# Patient Record
Sex: FEMALE | Race: WHITE | Employment: OTHER | URBAN - METROPOLITAN AREA
[De-identification: names, ages, dates, MRNs, and addresses within clinical notes are randomized per-mention and may not be internally consistent; named-entity substitution may affect disease eponyms.]

---

## 2018-01-01 ENCOUNTER — HOSPITAL ENCOUNTER (OUTPATIENT)
Dept: LAB | Age: 78
Discharge: HOME | End: 2018-12-17
Payer: MEDICARE

## 2018-01-01 LAB
ALBUMIN SERPL-MCNC: 4.1 G/DL (ref 3.9–4.9)
ALP BLD-CCNC: 61 U/L (ref 40–130)
ALT SERPL-CCNC: 23 U/L (ref 0–33)
ANION GAP SERPL CALCULATED.3IONS-SCNC: 11 MEQ/L (ref 7–13)
AST SERPL-CCNC: 34 U/L (ref 0–35)
BILIRUB SERPL-MCNC: 0.4 MG/DL (ref 0–1.2)
BUN BLDV-MCNC: 18 MG/DL (ref 8–23)
CALCIUM SERPL-MCNC: 8.9 MG/DL (ref 8.6–10.2)
CHLORIDE BLD-SCNC: 93 MEQ/L (ref 98–107)
CHOLESTEROL, TOTAL: 162 MG/DL (ref 0–199)
CO2: 28 MEQ/L (ref 22–29)
CREAT SERPL-MCNC: 0.55 MG/DL (ref 0.5–0.9)
GFR AFRICAN AMERICAN: >60
GFR NON-AFRICAN AMERICAN: >60
GLOBULIN: 3.2 G/DL (ref 2.3–3.5)
GLUCOSE BLD-MCNC: 89 MG/DL (ref 74–109)
HDLC SERPL-MCNC: 51 MG/DL (ref 40–59)
LDL CHOLESTEROL CALCULATED: 91 MG/DL (ref 0–129)
POTASSIUM SERPL-SCNC: 3.8 MEQ/L (ref 3.5–5.1)
SODIUM BLD-SCNC: 132 MEQ/L (ref 132–144)
TOTAL PROTEIN: 7.3 G/DL (ref 6.4–8.1)
TRIGL SERPL-MCNC: 99 MG/DL (ref 0–200)
TSH SERPL DL<=0.05 MIU/L-ACNC: 1.82 UIU/ML (ref 0.27–4.2)

## 2018-01-01 PROCEDURE — 80061 LIPID PANEL: CPT

## 2018-01-01 PROCEDURE — 36415 COLL VENOUS BLD VENIPUNCTURE: CPT

## 2018-01-01 PROCEDURE — 80053 COMPREHEN METABOLIC PANEL: CPT

## 2018-01-01 PROCEDURE — 84443 ASSAY THYROID STIM HORMONE: CPT

## 2019-01-01 ENCOUNTER — APPOINTMENT (OUTPATIENT)
Dept: GENERAL RADIOLOGY | Age: 79
DRG: 871 | End: 2019-01-01
Attending: INTERNAL MEDICINE
Payer: MEDICARE

## 2019-01-01 ENCOUNTER — APPOINTMENT (OUTPATIENT)
Dept: ULTRASOUND IMAGING | Age: 79
DRG: 871 | End: 2019-01-01
Attending: INTERNAL MEDICINE
Payer: MEDICARE

## 2019-01-01 ENCOUNTER — HOSPITAL ENCOUNTER (INPATIENT)
Age: 79
LOS: 7 days | Discharge: HOSPICE | DRG: 871 | End: 2019-05-14
Attending: INTERNAL MEDICINE | Admitting: INTERNAL MEDICINE
Payer: MEDICARE

## 2019-01-01 ENCOUNTER — APPOINTMENT (OUTPATIENT)
Dept: CT IMAGING | Age: 79
DRG: 871 | End: 2019-01-01
Attending: INTERNAL MEDICINE
Payer: MEDICARE

## 2019-01-01 ENCOUNTER — HOSPITAL ENCOUNTER (OUTPATIENT)
Dept: LAB | Age: 79
Discharge: HOME | End: 2019-05-06
Payer: MEDICARE

## 2019-01-01 ENCOUNTER — HOSPITAL ENCOUNTER (EMERGENCY)
Age: 79
Discharge: HOME | End: 2019-04-27
Attending: EMERGENCY MEDICINE
Payer: MEDICARE

## 2019-01-01 ENCOUNTER — APPOINTMENT (OUTPATIENT)
Dept: GENERAL RADIOLOGY | Age: 79
End: 2019-01-01
Payer: MEDICARE

## 2019-01-01 ENCOUNTER — APPOINTMENT (OUTPATIENT)
Dept: INTERVENTIONAL RADIOLOGY/VASCULAR | Age: 79
DRG: 871 | End: 2019-01-01
Attending: INTERNAL MEDICINE
Payer: MEDICARE

## 2019-01-01 ENCOUNTER — HOSPITAL ENCOUNTER (EMERGENCY)
Age: 79
Discharge: ANOTHER HOSPITAL | End: 2019-05-07
Attending: EMERGENCY MEDICINE
Payer: MEDICARE

## 2019-01-01 ENCOUNTER — APPOINTMENT (OUTPATIENT)
Dept: CT IMAGING | Age: 79
End: 2019-01-01
Payer: MEDICARE

## 2019-01-01 VITALS
BODY MASS INDEX: 19.15 KG/M2 | WEIGHT: 95 LBS | HEIGHT: 59 IN | RESPIRATION RATE: 20 BRPM | OXYGEN SATURATION: 94 % | DIASTOLIC BLOOD PRESSURE: 70 MMHG | HEART RATE: 98 BPM | SYSTOLIC BLOOD PRESSURE: 149 MMHG | TEMPERATURE: 98.5 F

## 2019-01-01 VITALS
BODY MASS INDEX: 18.89 KG/M2 | WEIGHT: 90 LBS | DIASTOLIC BLOOD PRESSURE: 56 MMHG | SYSTOLIC BLOOD PRESSURE: 96 MMHG | OXYGEN SATURATION: 92 % | RESPIRATION RATE: 23 BRPM | HEIGHT: 58 IN | TEMPERATURE: 99.4 F | HEART RATE: 102 BPM

## 2019-01-01 VITALS
SYSTOLIC BLOOD PRESSURE: 146 MMHG | DIASTOLIC BLOOD PRESSURE: 74 MMHG | HEART RATE: 111 BPM | WEIGHT: 97.66 LBS | RESPIRATION RATE: 28 BRPM | BODY MASS INDEX: 20.5 KG/M2 | TEMPERATURE: 97.8 F | OXYGEN SATURATION: 90 % | HEIGHT: 58 IN

## 2019-01-01 DIAGNOSIS — I47.1 PAROXYSMAL SUPRAVENTRICULAR TACHYCARDIA (HCC): ICD-10-CM

## 2019-01-01 DIAGNOSIS — J90 PLEURAL EFFUSION, LEFT: ICD-10-CM

## 2019-01-01 DIAGNOSIS — R11.2 NAUSEA AND VOMITING, INTRACTABILITY OF VOMITING NOT SPECIFIED, UNSPECIFIED VOMITING TYPE: ICD-10-CM

## 2019-01-01 DIAGNOSIS — J18.9 PNEUMONIA DUE TO ORGANISM: Primary | ICD-10-CM

## 2019-01-01 DIAGNOSIS — R53.1 GENERALIZED WEAKNESS: ICD-10-CM

## 2019-01-01 DIAGNOSIS — J18.9 PNEUMONIA OF RIGHT MIDDLE LOBE DUE TO INFECTIOUS ORGANISM: Primary | ICD-10-CM

## 2019-01-01 DIAGNOSIS — I95.9 HYPOTENSION, UNSPECIFIED HYPOTENSION TYPE: ICD-10-CM

## 2019-01-01 DIAGNOSIS — R77.8 ELEVATED TROPONIN: ICD-10-CM

## 2019-01-01 LAB
ALBUMIN FLUID: 2 G/DL
ALBUMIN SERPL-MCNC: 2.3 G/DL (ref 3.5–4.6)
ALBUMIN SERPL-MCNC: 2.4 G/DL (ref 3.5–4.6)
ALBUMIN SERPL-MCNC: 2.5 G/DL (ref 3.5–4.6)
ALBUMIN SERPL-MCNC: 2.6 G/DL (ref 3.5–4.6)
ALBUMIN SERPL-MCNC: 2.7 G/DL (ref 3.5–4.6)
ALBUMIN SERPL-MCNC: 2.7 G/DL (ref 3.5–4.6)
ALBUMIN SERPL-MCNC: 2.8 G/DL (ref 3.5–4.6)
ALBUMIN SERPL-MCNC: 2.9 G/DL (ref 3.5–4.6)
ALBUMIN SERPL-MCNC: 3.2 G/DL (ref 3.5–4.6)
ALP BLD-CCNC: 35 U/L (ref 40–130)
ALP BLD-CCNC: 35 U/L (ref 40–130)
ALP BLD-CCNC: 36 U/L (ref 40–130)
ALP BLD-CCNC: 41 U/L (ref 40–130)
ALP BLD-CCNC: 43 U/L (ref 40–130)
ALP BLD-CCNC: 46 U/L (ref 40–130)
ALP BLD-CCNC: 50 U/L (ref 40–130)
ALP BLD-CCNC: 51 U/L (ref 40–130)
ALP BLD-CCNC: 55 U/L (ref 40–130)
ALT SERPL-CCNC: 10 U/L (ref 0–33)
ALT SERPL-CCNC: 11 U/L (ref 0–33)
ALT SERPL-CCNC: 11 U/L (ref 0–33)
ALT SERPL-CCNC: 15 U/L (ref 0–33)
ALT SERPL-CCNC: 15 U/L (ref 0–33)
ALT SERPL-CCNC: 18 U/L (ref 0–33)
ALT SERPL-CCNC: 19 U/L (ref 0–33)
ALT SERPL-CCNC: 20 U/L (ref 0–33)
ALT SERPL-CCNC: 20 U/L (ref 0–33)
AMYLASE FLUID: 29 U/L
AMYLASE FLUID: 43 U/L
ANION GAP SERPL CALCULATED.3IONS-SCNC: 10 MEQ/L (ref 9–15)
ANION GAP SERPL CALCULATED.3IONS-SCNC: 11 MEQ/L (ref 9–15)
ANION GAP SERPL CALCULATED.3IONS-SCNC: 11 MEQ/L (ref 9–15)
ANION GAP SERPL CALCULATED.3IONS-SCNC: 13 MEQ/L (ref 9–15)
ANION GAP SERPL CALCULATED.3IONS-SCNC: 13 MEQ/L (ref 9–15)
ANION GAP SERPL CALCULATED.3IONS-SCNC: 14 MEQ/L (ref 9–15)
ANION GAP SERPL CALCULATED.3IONS-SCNC: 14 MEQ/L (ref 9–15)
ANION GAP SERPL CALCULATED.3IONS-SCNC: 16 MEQ/L (ref 9–15)
ANION GAP SERPL CALCULATED.3IONS-SCNC: 6 MEQ/L (ref 9–15)
ANISOCYTOSIS: 0
APPEARANCE FLUID: CLEAR
APPEARANCE FLUID: NORMAL
AST SERPL-CCNC: 20 U/L (ref 0–35)
AST SERPL-CCNC: 20 U/L (ref 0–35)
AST SERPL-CCNC: 22 U/L (ref 0–35)
AST SERPL-CCNC: 25 U/L (ref 0–35)
AST SERPL-CCNC: 30 U/L (ref 0–35)
AST SERPL-CCNC: 32 U/L (ref 0–35)
AST SERPL-CCNC: 34 U/L (ref 0–35)
AST SERPL-CCNC: 41 U/L (ref 0–35)
AST SERPL-CCNC: 41 U/L (ref 0–35)
BASE EXCESS ARTERIAL: 11 (ref -3–3)
BASE EXCESS ARTERIAL: 5 (ref -3–3)
BASOPHILS ABSOLUTE: 0 K/UL (ref 0–0.2)
BASOPHILS RELATIVE PERCENT: 0.1 %
BASOPHILS RELATIVE PERCENT: 0.2 %
BASOPHILS RELATIVE PERCENT: 0.2 %
BASOPHILS RELATIVE PERCENT: 0.3 %
BASOPHILS RELATIVE PERCENT: 0.6 %
BILIRUB SERPL-MCNC: 0.4 MG/DL (ref 0.2–0.7)
BILIRUB SERPL-MCNC: 0.5 MG/DL (ref 0.2–0.7)
BILIRUB SERPL-MCNC: 0.6 MG/DL (ref 0.2–0.7)
BILIRUB SERPL-MCNC: 0.6 MG/DL (ref 0.2–0.7)
BILIRUB SERPL-MCNC: 0.7 MG/DL (ref 0.2–0.7)
BILIRUB SERPL-MCNC: 0.7 MG/DL (ref 0.2–0.7)
BILIRUB SERPL-MCNC: 0.8 MG/DL (ref 0.2–0.7)
BILIRUB SERPL-MCNC: 0.9 MG/DL (ref 0.2–0.7)
BILIRUB SERPL-MCNC: 1 MG/DL (ref 0.2–0.7)
BILIRUBIN URINE: NEGATIVE
BLOOD CULTURE, ROUTINE: NORMAL
BLOOD, URINE: NEGATIVE
BODY FLUID CULTURE, STERILE: NORMAL
BODY FLUID CULTURE, STERILE: NORMAL
BUN BLDV-MCNC: 13 MG/DL (ref 8–23)
BUN BLDV-MCNC: 16 MG/DL (ref 8–23)
BUN BLDV-MCNC: 16 MG/DL (ref 8–23)
BUN BLDV-MCNC: 17 MG/DL (ref 8–23)
BUN BLDV-MCNC: 20 MG/DL (ref 8–23)
BUN BLDV-MCNC: 30 MG/DL (ref 8–23)
BUN BLDV-MCNC: 46 MG/DL (ref 8–23)
BUN BLDV-MCNC: 48 MG/DL (ref 8–23)
BUN BLDV-MCNC: 51 MG/DL (ref 8–23)
CALCIUM IONIZED: 1.07 MMOL/L (ref 1.12–1.32)
CALCIUM IONIZED: 1.16 MMOL/L (ref 1.12–1.32)
CALCIUM SERPL-MCNC: 7.6 MG/DL (ref 8.5–9.9)
CALCIUM SERPL-MCNC: 7.6 MG/DL (ref 8.5–9.9)
CALCIUM SERPL-MCNC: 7.8 MG/DL (ref 8.5–9.9)
CALCIUM SERPL-MCNC: 7.8 MG/DL (ref 8.5–9.9)
CALCIUM SERPL-MCNC: 8 MG/DL (ref 8.5–9.9)
CALCIUM SERPL-MCNC: 8.1 MG/DL (ref 8.5–9.9)
CALCIUM SERPL-MCNC: 8.1 MG/DL (ref 8.5–9.9)
CALCIUM SERPL-MCNC: 8.6 MG/DL (ref 8.5–9.9)
CALCIUM SERPL-MCNC: 9 MG/DL (ref 8.5–9.9)
CELL COUNT FLUID TYPE: NORMAL
CELL COUNT FLUID TYPE: NORMAL
CHLORIDE BLD-SCNC: 80 MEQ/L (ref 95–107)
CHLORIDE BLD-SCNC: 83 MEQ/L (ref 95–107)
CHLORIDE BLD-SCNC: 86 MEQ/L (ref 95–107)
CHLORIDE BLD-SCNC: 87 MEQ/L (ref 95–107)
CHLORIDE BLD-SCNC: 90 MEQ/L (ref 95–107)
CHLORIDE BLD-SCNC: 91 MEQ/L (ref 95–107)
CHLORIDE BLD-SCNC: 92 MEQ/L (ref 95–107)
CHLORIDE BLD-SCNC: 93 MEQ/L (ref 95–107)
CHLORIDE BLD-SCNC: 94 MEQ/L (ref 95–107)
CLARITY: CLEAR
CLOT EVALUATION: NORMAL
CLOT EVALUATION: NORMAL
CO2: 27 MEQ/L (ref 20–31)
CO2: 29 MEQ/L (ref 20–31)
CO2: 29 MEQ/L (ref 20–31)
CO2: 31 MEQ/L (ref 20–31)
CO2: 32 MEQ/L (ref 20–31)
CO2: 32 MEQ/L (ref 20–31)
CO2: 33 MEQ/L (ref 20–31)
CO2: 36 MEQ/L (ref 20–31)
CO2: 37 MEQ/L (ref 20–31)
COLOR FLUID: COLORLESS
COLOR FLUID: YELLOW
COLOR: YELLOW
CREAT SERPL-MCNC: 0.56 MG/DL (ref 0.5–0.9)
CREAT SERPL-MCNC: 0.61 MG/DL (ref 0.5–0.9)
CREAT SERPL-MCNC: 0.63 MG/DL (ref 0.5–0.9)
CREAT SERPL-MCNC: 0.66 MG/DL (ref 0.5–0.9)
CREAT SERPL-MCNC: 0.69 MG/DL (ref 0.5–0.9)
CREAT SERPL-MCNC: 1.17 MG/DL (ref 0.5–0.9)
CREAT SERPL-MCNC: 1.52 MG/DL (ref 0.5–0.9)
CREAT SERPL-MCNC: 1.82 MG/DL (ref 0.5–0.9)
CREAT SERPL-MCNC: 2.01 MG/DL (ref 0.5–0.9)
CULTURE, BLOOD 2: NORMAL
CULTURE, RESPIRATORY: NORMAL
EKG ATRIAL RATE: 101 BPM
EKG ATRIAL RATE: 103 BPM
EKG ATRIAL RATE: 109 BPM
EKG ATRIAL RATE: 156 BPM
EKG ATRIAL RATE: 169 BPM
EKG ATRIAL RATE: 93 BPM
EKG ATRIAL RATE: 96 BPM
EKG ATRIAL RATE: 96 BPM
EKG ATRIAL RATE: 99 BPM
EKG P AXIS: -1 DEGREES
EKG P AXIS: -16 DEGREES
EKG P AXIS: -4 DEGREES
EKG P AXIS: 38 DEGREES
EKG P AXIS: 39 DEGREES
EKG P AXIS: 5 DEGREES
EKG P AXIS: 58 DEGREES
EKG P-R INTERVAL: 106 MS
EKG P-R INTERVAL: 106 MS
EKG P-R INTERVAL: 114 MS
EKG P-R INTERVAL: 118 MS
EKG P-R INTERVAL: 118 MS
EKG P-R INTERVAL: 130 MS
EKG P-R INTERVAL: 228 MS
EKG P-R INTERVAL: 98 MS
EKG Q-T INTERVAL: 234 MS
EKG Q-T INTERVAL: 322 MS
EKG Q-T INTERVAL: 328 MS
EKG Q-T INTERVAL: 336 MS
EKG Q-T INTERVAL: 340 MS
EKG Q-T INTERVAL: 342 MS
EKG Q-T INTERVAL: 346 MS
EKG Q-T INTERVAL: 362 MS
EKG Q-T INTERVAL: 368 MS
EKG QRS DURATION: 74 MS
EKG QRS DURATION: 78 MS
EKG QRS DURATION: 80 MS
EKG QRS DURATION: 82 MS
EKG QRS DURATION: 82 MS
EKG QRS DURATION: 84 MS
EKG QRS DURATION: 86 MS
EKG QTC CALCULATION (BAZETT): 392 MS
EKG QTC CALCULATION (BAZETT): 420 MS
EKG QTC CALCULATION (BAZETT): 429 MS
EKG QTC CALCULATION (BAZETT): 430 MS
EKG QTC CALCULATION (BAZETT): 440 MS
EKG QTC CALCULATION (BAZETT): 460 MS
EKG QTC CALCULATION (BAZETT): 464 MS
EKG QTC CALCULATION (BAZETT): 469 MS
EKG QTC CALCULATION (BAZETT): 522 MS
EKG R AXIS: 40 DEGREES
EKG R AXIS: 55 DEGREES
EKG R AXIS: 56 DEGREES
EKG R AXIS: 57 DEGREES
EKG R AXIS: 58 DEGREES
EKG R AXIS: 58 DEGREES
EKG R AXIS: 59 DEGREES
EKG R AXIS: 66 DEGREES
EKG R AXIS: 69 DEGREES
EKG T AXIS: 117 DEGREES
EKG T AXIS: 35 DEGREES
EKG T AXIS: 70 DEGREES
EKG T AXIS: 74 DEGREES
EKG T AXIS: 74 DEGREES
EKG T AXIS: 80 DEGREES
EKG T AXIS: 80 DEGREES
EKG T AXIS: 81 DEGREES
EKG T AXIS: 89 DEGREES
EKG VENTRICULAR RATE: 101 BPM
EKG VENTRICULAR RATE: 103 BPM
EKG VENTRICULAR RATE: 109 BPM
EKG VENTRICULAR RATE: 158 BPM
EKG VENTRICULAR RATE: 169 BPM
EKG VENTRICULAR RATE: 93 BPM
EKG VENTRICULAR RATE: 96 BPM
EKG VENTRICULAR RATE: 96 BPM
EKG VENTRICULAR RATE: 99 BPM
EOSINOPHIL FLUID: 5 %
EOSINOPHILS ABSOLUTE: 0 K/UL (ref 0–0.7)
EOSINOPHILS ABSOLUTE: 0 K/UL (ref 0–0.7)
EOSINOPHILS ABSOLUTE: 0.1 K/UL (ref 0–0.7)
EOSINOPHILS ABSOLUTE: 0.3 K/UL (ref 0–0.7)
EOSINOPHILS RELATIVE PERCENT: 0 %
EOSINOPHILS RELATIVE PERCENT: 0.2 %
EOSINOPHILS RELATIVE PERCENT: 0.7 %
EOSINOPHILS RELATIVE PERCENT: 1.1 %
EOSINOPHILS RELATIVE PERCENT: 2 %
EOSINOPHILS RELATIVE PERCENT: 2.8 %
EOSINOPHILS RELATIVE PERCENT: 3.8 %
FERRITIN: 221.5 NG/ML (ref 13–150)
FLUID PATH CONSULT: YES
FLUID TYPE: NORMAL
FLUID TYPE: NORMAL
GFR AFRICAN AMERICAN: 28.9
GFR AFRICAN AMERICAN: 32.4
GFR AFRICAN AMERICAN: 39.9
GFR AFRICAN AMERICAN: 54
GFR AFRICAN AMERICAN: >60
GFR NON-AFRICAN AMERICAN: 23.9
GFR NON-AFRICAN AMERICAN: 26.8
GFR NON-AFRICAN AMERICAN: 33
GFR NON-AFRICAN AMERICAN: 44.6
GFR NON-AFRICAN AMERICAN: >60
GLOBULIN: 2.2 G/DL (ref 2.3–3.5)
GLOBULIN: 2.3 G/DL (ref 2.3–3.5)
GLOBULIN: 2.3 G/DL (ref 2.3–3.5)
GLOBULIN: 2.4 G/DL (ref 2.3–3.5)
GLOBULIN: 2.6 G/DL (ref 2.3–3.5)
GLOBULIN: 2.7 G/DL (ref 2.3–3.5)
GLOBULIN: 2.8 G/DL (ref 2.3–3.5)
GLOBULIN: 2.9 G/DL (ref 2.3–3.5)
GLOBULIN: 3.4 G/DL (ref 2.3–3.5)
GLUCOSE BLD-MCNC: 104 MG/DL (ref 70–99)
GLUCOSE BLD-MCNC: 105 MG/DL (ref 70–99)
GLUCOSE BLD-MCNC: 108 MG/DL (ref 70–99)
GLUCOSE BLD-MCNC: 109 MG/DL (ref 70–99)
GLUCOSE BLD-MCNC: 129 MG/DL (ref 70–99)
GLUCOSE BLD-MCNC: 129 MG/DL (ref 70–99)
GLUCOSE BLD-MCNC: 138 MG/DL (ref 70–99)
GLUCOSE BLD-MCNC: 145 MG/DL (ref 60–115)
GLUCOSE BLD-MCNC: 99 MG/DL (ref 70–99)
GLUCOSE BLD-MCNC: 99 MG/DL (ref 70–99)
GLUCOSE URINE: NEGATIVE MG/DL
GLUCOSE, FLUID: 109.5 MG/DL
GRAM STAIN RESULT: NORMAL
HCO3 ARTERIAL: 31.3 MMOL/L (ref 21–29)
HCO3 ARTERIAL: 35.2 MMOL/L (ref 21–29)
HCT VFR BLD CALC: 30 % (ref 37–47)
HCT VFR BLD CALC: 30.6 % (ref 37–47)
HCT VFR BLD CALC: 31.5 % (ref 37–47)
HCT VFR BLD CALC: 32.4 % (ref 37–47)
HCT VFR BLD CALC: 32.5 % (ref 37–47)
HCT VFR BLD CALC: 33 % (ref 37–47)
HCT VFR BLD CALC: 33.1 % (ref 37–47)
HCT VFR BLD CALC: 34.2 % (ref 37–47)
HCT VFR BLD CALC: 35.9 % (ref 37–47)
HCT VFR BLD CALC: 39.1 % (ref 37–47)
HEMOGLOBIN: 10.3 G/DL (ref 12–16)
HEMOGLOBIN: 10.4 G/DL (ref 12–16)
HEMOGLOBIN: 10.5 G/DL (ref 12–16)
HEMOGLOBIN: 10.7 G/DL (ref 12–16)
HEMOGLOBIN: 10.7 G/DL (ref 12–16)
HEMOGLOBIN: 10.8 GM/DL (ref 12–16)
HEMOGLOBIN: 10.9 G/DL (ref 12–16)
HEMOGLOBIN: 11.4 G/DL (ref 12–16)
HEMOGLOBIN: 11.8 G/DL (ref 12–16)
HEMOGLOBIN: 12.9 G/DL (ref 12–16)
HEMOGLOBIN: 9.9 G/DL (ref 12–16)
HYPOCHROMIA: 0
INR BLD: 1.1
INR BLD: 1.1
IRON SATURATION: 16 % (ref 11–46)
IRON: 39 UG/DL (ref 37–145)
KETONES, URINE: NORMAL MG/DL
LACTATE: 0.57 MMOL/L (ref 0.4–2)
LACTATE: 1.25 MMOL/L (ref 0.4–2)
LACTIC ACID: 1.6 MMOL/L (ref 0.5–2.2)
LD, FLUID: 128 U/L
LD, FLUID: 163 U/L
LEUKOCYTE ESTERASE, URINE: NEGATIVE
LIPASE: 135 U/L (ref 12–95)
LIPASE: 59 U/L (ref 12–95)
LV EF: 65 %
LVEF MODALITY: NORMAL
LYMPHOCYTES ABSOLUTE: 0.2 K/UL (ref 1–4.8)
LYMPHOCYTES ABSOLUTE: 0.3 K/UL (ref 1–4.8)
LYMPHOCYTES ABSOLUTE: 0.3 K/UL (ref 1–4.8)
LYMPHOCYTES ABSOLUTE: 0.5 K/UL (ref 1–4.8)
LYMPHOCYTES ABSOLUTE: 0.6 K/UL (ref 1–4.8)
LYMPHOCYTES ABSOLUTE: 0.6 K/UL (ref 1–4.8)
LYMPHOCYTES ABSOLUTE: 0.7 K/UL (ref 1–4.8)
LYMPHOCYTES RELATIVE PERCENT: 1 %
LYMPHOCYTES RELATIVE PERCENT: 4.2 %
LYMPHOCYTES RELATIVE PERCENT: 5.4 %
LYMPHOCYTES RELATIVE PERCENT: 6.1 %
LYMPHOCYTES RELATIVE PERCENT: 6.4 %
LYMPHOCYTES RELATIVE PERCENT: 8.6 %
LYMPHOCYTES RELATIVE PERCENT: 9.6 %
LYMPHOCYTES, BODY FLUID: 35 %
LYMPHOCYTES, BODY FLUID: 88 %
MACROCYTES: 0
MAGNESIUM: 1.5 MG/DL (ref 1.7–2.4)
MAGNESIUM: 1.7 MG/DL (ref 1.7–2.4)
MAGNESIUM: 1.8 MG/DL (ref 1.7–2.4)
MAGNESIUM: 1.8 MG/DL (ref 1.7–2.4)
MAGNESIUM: 2 MG/DL (ref 1.7–2.4)
MAGNESIUM: 2.1 MG/DL (ref 1.7–2.4)
MCH RBC QN AUTO: 26.3 PG (ref 27–31.3)
MCH RBC QN AUTO: 26.5 PG (ref 27–31.3)
MCH RBC QN AUTO: 26.6 PG (ref 27–31.3)
MCH RBC QN AUTO: 26.7 PG (ref 27–31.3)
MCH RBC QN AUTO: 26.7 PG (ref 27–31.3)
MCH RBC QN AUTO: 26.8 PG (ref 27–31.3)
MCH RBC QN AUTO: 27 PG (ref 27–31.3)
MCHC RBC AUTO-ENTMCNC: 31.9 % (ref 33–37)
MCHC RBC AUTO-ENTMCNC: 32.6 % (ref 33–37)
MCHC RBC AUTO-ENTMCNC: 32.8 % (ref 33–37)
MCHC RBC AUTO-ENTMCNC: 32.9 % (ref 33–37)
MCHC RBC AUTO-ENTMCNC: 32.9 % (ref 33–37)
MCHC RBC AUTO-ENTMCNC: 33 % (ref 33–37)
MCHC RBC AUTO-ENTMCNC: 33.1 % (ref 33–37)
MCHC RBC AUTO-ENTMCNC: 33.3 % (ref 33–37)
MCHC RBC AUTO-ENTMCNC: 33.5 % (ref 33–37)
MCV RBC AUTO: 80.2 FL (ref 82–100)
MCV RBC AUTO: 80.3 FL (ref 82–100)
MCV RBC AUTO: 80.4 FL (ref 82–100)
MCV RBC AUTO: 80.7 FL (ref 82–100)
MCV RBC AUTO: 80.7 FL (ref 82–100)
MCV RBC AUTO: 80.8 FL (ref 82–100)
MCV RBC AUTO: 81.2 FL (ref 82–100)
MCV RBC AUTO: 81.3 FL (ref 82–100)
MCV RBC AUTO: 82.4 FL (ref 82–100)
MESOTHELIAL FLUID: 20 %
MICROCYTES: 0
MONOCYTE, FLUID: 4 %
MONOCYTE, FLUID: 7 %
MONOCYTES ABSOLUTE: 0.7 K/UL (ref 0.2–0.8)
MONOCYTES ABSOLUTE: 1.2 K/UL (ref 0.2–0.8)
MONOCYTES ABSOLUTE: 1.3 K/UL (ref 0.2–0.8)
MONOCYTES ABSOLUTE: 1.4 K/UL (ref 0.2–0.8)
MONOCYTES ABSOLUTE: 1.4 K/UL (ref 0.2–0.8)
MONOCYTES RELATIVE PERCENT: 10 %
MONOCYTES RELATIVE PERCENT: 13.7 %
MONOCYTES RELATIVE PERCENT: 15.1 %
MONOCYTES RELATIVE PERCENT: 15.4 %
MONOCYTES RELATIVE PERCENT: 16.3 %
MONOCYTES RELATIVE PERCENT: 18.7 %
MONOCYTES RELATIVE PERCENT: 2.6 %
NEUTROPHIL, FLUID: 33 %
NEUTROPHIL, FLUID: 8 %
NEUTROPHILS ABSOLUTE: 12 K/UL (ref 1.4–6.5)
NEUTROPHILS ABSOLUTE: 23.1 K/UL (ref 1.4–6.5)
NEUTROPHILS ABSOLUTE: 3.5 K/UL (ref 1.4–6.5)
NEUTROPHILS ABSOLUTE: 3.7 K/UL (ref 1.4–6.5)
NEUTROPHILS ABSOLUTE: 5.1 K/UL (ref 1.4–6.5)
NEUTROPHILS ABSOLUTE: 5.3 K/UL (ref 1.4–6.5)
NEUTROPHILS ABSOLUTE: 7.5 K/UL (ref 1.4–6.5)
NEUTROPHILS RELATIVE PERCENT: 67.6 %
NEUTROPHILS RELATIVE PERCENT: 73.7 %
NEUTROPHILS RELATIVE PERCENT: 75.1 %
NEUTROPHILS RELATIVE PERCENT: 76.2 %
NEUTROPHILS RELATIVE PERCENT: 80 %
NEUTROPHILS RELATIVE PERCENT: 85.5 %
NEUTROPHILS RELATIVE PERCENT: 97 %
NITRITE, URINE: NEGATIVE
NUCLEATED CELLS FLUID: 423 /CUMM
NUCLEATED CELLS FLUID: 635 /CUMM
NUMBER OF CELLS COUNTED FLUID: 100
NUMBER OF CELLS COUNTED FLUID: 100
O2 SAT, ARTERIAL: 92 % (ref 93–100)
O2 SAT, ARTERIAL: 97 % (ref 93–100)
OCCULT BLOOD, OTHER: POSITIVE
PATH CONSULT FLUID: NORMAL
PCO2 ARTERIAL: 50 MM HG (ref 35–45)
PCO2 ARTERIAL: 61 MM HG (ref 35–45)
PDW BLD-RTO: 14 % (ref 11.5–14.5)
PDW BLD-RTO: 14.3 % (ref 11.5–14.5)
PDW BLD-RTO: 14.6 % (ref 11.5–14.5)
PDW BLD-RTO: 14.7 % (ref 11.5–14.5)
PDW BLD-RTO: 14.7 % (ref 11.5–14.5)
PDW BLD-RTO: 14.8 % (ref 11.5–14.5)
PDW BLD-RTO: 14.9 % (ref 11.5–14.5)
PERFORMED ON: ABNORMAL
PERFORMED ON: ABNORMAL
PH ARTERIAL: 7.32 (ref 7.35–7.45)
PH ARTERIAL: 7.46 (ref 7.35–7.45)
PH UA: 5.5 (ref 5–9)
PLATELET # BLD: 113 K/UL (ref 130–400)
PLATELET # BLD: 118 K/UL (ref 130–400)
PLATELET # BLD: 124 K/UL (ref 130–400)
PLATELET # BLD: 138 K/UL (ref 130–400)
PLATELET # BLD: 139 K/UL (ref 130–400)
PLATELET # BLD: 151 K/UL (ref 130–400)
PLATELET # BLD: 188 K/UL (ref 130–400)
PLATELET # BLD: 189 K/UL (ref 130–400)
PLATELET # BLD: 204 K/UL (ref 130–400)
PLATELET SLIDE REVIEW: NORMAL
PO2 ARTERIAL: 62 MM HG (ref 75–108)
PO2 ARTERIAL: 99 MM HG (ref 75–108)
POC CHLORIDE: 94 MEQ/L (ref 99–110)
POC CREATININE: 0.9 MG/DL (ref 0.6–1.2)
POC FIO2: 12
POC FIO2: 90
POC HEMATOCRIT: 32 % (ref 36–48)
POC POTASSIUM: 4.1 MEQ/L (ref 3.5–5.1)
POC SAMPLE TYPE: ABNORMAL
POC SAMPLE TYPE: ABNORMAL
POC SODIUM: 132 MEQ/L (ref 136–145)
POIKILOCYTES: 0
POLYCHROMASIA: 0
POTASSIUM REFLEX MAGNESIUM: 3.2 MEQ/L (ref 3.4–4.9)
POTASSIUM REFLEX MAGNESIUM: 3.2 MEQ/L (ref 3.4–4.9)
POTASSIUM REFLEX MAGNESIUM: 3.7 MEQ/L (ref 3.4–4.9)
POTASSIUM REFLEX MAGNESIUM: 3.8 MEQ/L (ref 3.4–4.9)
POTASSIUM REFLEX MAGNESIUM: 3.9 MEQ/L (ref 3.4–4.9)
POTASSIUM REFLEX MAGNESIUM: 4.2 MEQ/L (ref 3.4–4.9)
POTASSIUM REFLEX MAGNESIUM: 4.5 MEQ/L (ref 3.4–4.9)
POTASSIUM SERPL-SCNC: 2.9 MEQ/L (ref 3.4–4.9)
POTASSIUM SERPL-SCNC: 3 MEQ/L (ref 3.4–4.9)
PRO-BNP: 1659 PG/ML
PROTEIN FLUID: 2.3 G/DL
PROTEIN FLUID: 3.4 G/DL
PROTEIN UA: NORMAL MG/DL
PROTHROMBIN TIME: 10.9 SEC (ref 9–11.5)
PROTHROMBIN TIME: 11.2 SEC (ref 9–11.5)
RAPID INFLUENZA  B AGN: NEGATIVE
RAPID INFLUENZA  B AGN: NEGATIVE
RAPID INFLUENZA A AGN: NEGATIVE
RAPID INFLUENZA A AGN: NEGATIVE
RBC # BLD: 3.72 M/UL (ref 4.2–5.4)
RBC # BLD: 3.92 M/UL (ref 4.2–5.4)
RBC # BLD: 3.99 M/UL (ref 4.2–5.4)
RBC # BLD: 4.01 M/UL (ref 4.2–5.4)
RBC # BLD: 4.02 M/UL (ref 4.2–5.4)
RBC # BLD: 4.09 M/UL (ref 4.2–5.4)
RBC # BLD: 4.27 M/UL (ref 4.2–5.4)
RBC # BLD: 4.48 M/UL (ref 4.2–5.4)
RBC # BLD: 4.81 M/UL (ref 4.2–5.4)
RBC FLUID: 2093 /CUMM
RBC FLUID: 571 /CUMM
S PYO AG THROAT QL: NEGATIVE
S PYO THROAT QL CULT: NORMAL
SLIDE REVIEW: ABNORMAL
SODIUM BLD-SCNC: 125 MEQ/L (ref 135–144)
SODIUM BLD-SCNC: 126 MEQ/L (ref 135–144)
SODIUM BLD-SCNC: 128 MEQ/L (ref 135–144)
SODIUM BLD-SCNC: 133 MEQ/L (ref 135–144)
SODIUM BLD-SCNC: 134 MEQ/L (ref 135–144)
SODIUM BLD-SCNC: 134 MEQ/L (ref 135–144)
SODIUM BLD-SCNC: 136 MEQ/L (ref 135–144)
SODIUM BLD-SCNC: 137 MEQ/L (ref 135–144)
SODIUM BLD-SCNC: 137 MEQ/L (ref 135–144)
SPECIFIC GRAVITY UA: 1.01 (ref 1–1.03)
TCO2 ARTERIAL: 33 (ref 22–29)
TCO2 ARTERIAL: 37 (ref 22–29)
TOTAL IRON BINDING CAPACITY: 241 UG/DL (ref 178–450)
TOTAL PROTEIN: 4.6 G/DL (ref 6.3–8)
TOTAL PROTEIN: 4.9 G/DL (ref 6.3–8)
TOTAL PROTEIN: 5 G/DL (ref 6.3–8)
TOTAL PROTEIN: 5.1 G/DL (ref 6.3–8)
TOTAL PROTEIN: 5.2 G/DL (ref 6.3–8)
TOTAL PROTEIN: 5.3 G/DL (ref 6.3–8)
TOTAL PROTEIN: 5.3 G/DL (ref 6.3–8)
TOTAL PROTEIN: 5.7 G/DL (ref 6.3–8)
TOTAL PROTEIN: 6.6 G/DL (ref 6.3–8)
TROPONIN: 0.03 NG/ML (ref 0–0.01)
TROPONIN: <0.01 NG/ML (ref 0–0.01)
TROPONIN: <0.01 NG/ML (ref 0–0.01)
URINE REFLEX TO CULTURE: NORMAL
UROBILINOGEN, URINE: 0.2 E.U./DL
VANCOMYCIN TROUGH: 20.2 UG/ML (ref 10–20)
VANCOMYCIN TROUGH: 7.4 UG/ML (ref 10–20)
VANCOMYCIN TROUGH: <4 UG/ML (ref 10–20)
VITAMIN B-12: 1622 PG/ML (ref 232–1245)
WBC # BLD: 10.2 K/UL (ref 4.8–10.8)
WBC # BLD: 14 K/UL (ref 4.8–10.8)
WBC # BLD: 21.7 K/UL (ref 4.8–10.8)
WBC # BLD: 23.8 K/UL (ref 4.8–10.8)
WBC # BLD: 4.7 K/UL (ref 4.8–10.8)
WBC # BLD: 4.9 K/UL (ref 4.8–10.8)
WBC # BLD: 7.2 K/UL (ref 4.8–10.8)
WBC # BLD: 7.5 K/UL (ref 4.8–10.8)
WBC # BLD: 9.4 K/UL (ref 4.8–10.8)

## 2019-01-01 PROCEDURE — 2580000003 HC RX 258: Performed by: HOSPITALIST

## 2019-01-01 PROCEDURE — 96367 TX/PROPH/DG ADDL SEQ IV INF: CPT

## 2019-01-01 PROCEDURE — 99231 SBSQ HOSP IP/OBS SF/LOW 25: CPT | Performed by: SURGERY

## 2019-01-01 PROCEDURE — 85014 HEMATOCRIT: CPT

## 2019-01-01 PROCEDURE — 83735 ASSAY OF MAGNESIUM: CPT

## 2019-01-01 PROCEDURE — 51702 INSERT TEMP BLADDER CATH: CPT

## 2019-01-01 PROCEDURE — 88341 IMHCHEM/IMCYTCHM EA ADD ANTB: CPT

## 2019-01-01 PROCEDURE — 2060000000 HC ICU INTERMEDIATE R&B

## 2019-01-01 PROCEDURE — 6360000002 HC RX W HCPCS: Performed by: INTERNAL MEDICINE

## 2019-01-01 PROCEDURE — 99223 1ST HOSP IP/OBS HIGH 75: CPT | Performed by: RADIOLOGY

## 2019-01-01 PROCEDURE — 36600 WITHDRAWAL OF ARTERIAL BLOOD: CPT

## 2019-01-01 PROCEDURE — 99222 1ST HOSP IP/OBS MODERATE 55: CPT | Performed by: SURGERY

## 2019-01-01 PROCEDURE — 84484 ASSAY OF TROPONIN QUANT: CPT

## 2019-01-01 PROCEDURE — 6370000000 HC RX 637 (ALT 250 FOR IP): Performed by: INTERNAL MEDICINE

## 2019-01-01 PROCEDURE — 82435 ASSAY OF BLOOD CHLORIDE: CPT

## 2019-01-01 PROCEDURE — 99291 CRITICAL CARE FIRST HOUR: CPT | Performed by: INTERNAL MEDICINE

## 2019-01-01 PROCEDURE — 97166 OT EVAL MOD COMPLEX 45 MIN: CPT

## 2019-01-01 PROCEDURE — 87070 CULTURE OTHR SPECIMN AEROBIC: CPT

## 2019-01-01 PROCEDURE — 2700000000 HC OXYGEN THERAPY PER DAY

## 2019-01-01 PROCEDURE — 87081 CULTURE SCREEN ONLY: CPT

## 2019-01-01 PROCEDURE — 2580000003 HC RX 258: Performed by: INTERNAL MEDICINE

## 2019-01-01 PROCEDURE — 6360000002 HC RX W HCPCS: Performed by: FAMILY MEDICINE

## 2019-01-01 PROCEDURE — 82945 GLUCOSE OTHER FLUID: CPT

## 2019-01-01 PROCEDURE — 6360000002 HC RX W HCPCS: Performed by: HOSPITALIST

## 2019-01-01 PROCEDURE — 49083 ABD PARACENTESIS W/IMAGING: CPT | Performed by: INTERNAL MEDICINE

## 2019-01-01 PROCEDURE — 94660 CPAP INITIATION&MGMT: CPT

## 2019-01-01 PROCEDURE — 88305 TISSUE EXAM BY PATHOLOGIST: CPT

## 2019-01-01 PROCEDURE — 2000000000 HC ICU R&B

## 2019-01-01 PROCEDURE — 83605 ASSAY OF LACTIC ACID: CPT

## 2019-01-01 PROCEDURE — 2500000003 HC RX 250 WO HCPCS: Performed by: INTERNAL MEDICINE

## 2019-01-01 PROCEDURE — 82803 BLOOD GASES ANY COMBINATION: CPT

## 2019-01-01 PROCEDURE — 85027 COMPLETE CBC AUTOMATED: CPT

## 2019-01-01 PROCEDURE — 82042 OTHER SOURCE ALBUMIN QUAN EA: CPT

## 2019-01-01 PROCEDURE — C9113 INJ PANTOPRAZOLE SODIUM, VIA: HCPCS | Performed by: INTERNAL MEDICINE

## 2019-01-01 PROCEDURE — 80053 COMPREHEN METABOLIC PANEL: CPT

## 2019-01-01 PROCEDURE — 71250 CT THORAX DX C-: CPT | Performed by: RADIOLOGY

## 2019-01-01 PROCEDURE — 97535 SELF CARE MNGMENT TRAINING: CPT

## 2019-01-01 PROCEDURE — 6370000000 HC RX 637 (ALT 250 FOR IP): Performed by: HOSPITALIST

## 2019-01-01 PROCEDURE — APPNB15 APP NON BILLABLE TIME 0-15 MINS: Performed by: NURSE PRACTITIONER

## 2019-01-01 PROCEDURE — 89051 BODY FLUID CELL COUNT: CPT

## 2019-01-01 PROCEDURE — 85025 COMPLETE CBC W/AUTO DIFF WBC: CPT

## 2019-01-01 PROCEDURE — 31624 DX BRONCHOSCOPE/LAVAGE: CPT

## 2019-01-01 PROCEDURE — 99283 EMERGENCY DEPT VISIT LOW MDM: CPT

## 2019-01-01 PROCEDURE — 93005 ELECTROCARDIOGRAM TRACING: CPT

## 2019-01-01 PROCEDURE — 0W9G3ZZ DRAINAGE OF PERITONEAL CAVITY, PERCUTANEOUS APPROACH: ICD-10-PCS | Performed by: INTERNAL MEDICINE

## 2019-01-01 PROCEDURE — 84295 ASSAY OF SERUM SODIUM: CPT

## 2019-01-01 PROCEDURE — 6360000002 HC RX W HCPCS

## 2019-01-01 PROCEDURE — 94640 AIRWAY INHALATION TREATMENT: CPT

## 2019-01-01 PROCEDURE — 82728 ASSAY OF FERRITIN: CPT

## 2019-01-01 PROCEDURE — 99222 1ST HOSP IP/OBS MODERATE 55: CPT | Performed by: INTERNAL MEDICINE

## 2019-01-01 PROCEDURE — 31646 BRNCHSC W/THER ASPIR SBSQ: CPT | Performed by: INTERNAL MEDICINE

## 2019-01-01 PROCEDURE — 93010 ELECTROCARDIOGRAM REPORT: CPT | Performed by: INTERNAL MEDICINE

## 2019-01-01 PROCEDURE — 6360000004 HC RX CONTRAST MEDICATION: Performed by: FAMILY MEDICINE

## 2019-01-01 PROCEDURE — 96361 HYDRATE IV INFUSION ADD-ON: CPT

## 2019-01-01 PROCEDURE — 96376 TX/PRO/DX INJ SAME DRUG ADON: CPT

## 2019-01-01 PROCEDURE — 96365 THER/PROPH/DIAG IV INF INIT: CPT

## 2019-01-01 PROCEDURE — 36556 INSERT NON-TUNNEL CV CATH: CPT | Performed by: INTERNAL MEDICINE

## 2019-01-01 PROCEDURE — 87205 SMEAR GRAM STAIN: CPT

## 2019-01-01 PROCEDURE — 84132 ASSAY OF SERUM POTASSIUM: CPT

## 2019-01-01 PROCEDURE — 99233 SBSQ HOSP IP/OBS HIGH 50: CPT | Performed by: INTERNAL MEDICINE

## 2019-01-01 PROCEDURE — C1729 CATH, DRAINAGE: HCPCS

## 2019-01-01 PROCEDURE — APPNB30 APP NON BILLABLE TIME 0-30 MINS: Performed by: NURSE PRACTITIONER

## 2019-01-01 PROCEDURE — 0B9G8ZX DRAINAGE OF LEFT UPPER LUNG LOBE, VIA NATURAL OR ARTIFICIAL OPENING ENDOSCOPIC, DIAGNOSTIC: ICD-10-PCS | Performed by: INTERNAL MEDICINE

## 2019-01-01 PROCEDURE — 99232 SBSQ HOSP IP/OBS MODERATE 35: CPT | Performed by: INTERNAL MEDICINE

## 2019-01-01 PROCEDURE — 94664 DEMO&/EVAL PT USE INHALER: CPT

## 2019-01-01 PROCEDURE — 36556 INSERT NON-TUNNEL CV CATH: CPT

## 2019-01-01 PROCEDURE — 83540 ASSAY OF IRON: CPT

## 2019-01-01 PROCEDURE — 88112 CYTOPATH CELL ENHANCE TECH: CPT

## 2019-01-01 PROCEDURE — 87804 INFLUENZA ASSAY W/OPTIC: CPT

## 2019-01-01 PROCEDURE — 94760 N-INVAS EAR/PLS OXIMETRY 1: CPT

## 2019-01-01 PROCEDURE — C9113 INJ PANTOPRAZOLE SODIUM, VIA: HCPCS | Performed by: HOSPITALIST

## 2019-01-01 PROCEDURE — 99231 SBSQ HOSP IP/OBS SF/LOW 25: CPT | Performed by: INTERNAL MEDICINE

## 2019-01-01 PROCEDURE — 36592 COLLECT BLOOD FROM PICC: CPT

## 2019-01-01 PROCEDURE — 99285 EMERGENCY DEPT VISIT HI MDM: CPT

## 2019-01-01 PROCEDURE — 84157 ASSAY OF PROTEIN OTHER: CPT

## 2019-01-01 PROCEDURE — 83550 IRON BINDING TEST: CPT

## 2019-01-01 PROCEDURE — 83690 ASSAY OF LIPASE: CPT

## 2019-01-01 PROCEDURE — 93970 EXTREMITY STUDY: CPT

## 2019-01-01 PROCEDURE — 71045 X-RAY EXAM CHEST 1 VIEW: CPT

## 2019-01-01 PROCEDURE — 93975 VASCULAR STUDY: CPT

## 2019-01-01 PROCEDURE — 92610 EVALUATE SWALLOWING FUNCTION: CPT

## 2019-01-01 PROCEDURE — 2580000003 HC RX 258: Performed by: EMERGENCY MEDICINE

## 2019-01-01 PROCEDURE — 80202 ASSAY OF VANCOMYCIN: CPT

## 2019-01-01 PROCEDURE — 6370000000 HC RX 637 (ALT 250 FOR IP): Performed by: EMERGENCY MEDICINE

## 2019-01-01 PROCEDURE — 31624 DX BRONCHOSCOPE/LAVAGE: CPT | Performed by: INTERNAL MEDICINE

## 2019-01-01 PROCEDURE — 83615 LACTATE (LD) (LDH) ENZYME: CPT

## 2019-01-01 PROCEDURE — 74177 CT ABD & PELVIS W/CONTRAST: CPT

## 2019-01-01 PROCEDURE — 2500000003 HC RX 250 WO HCPCS: Performed by: RADIOLOGY

## 2019-01-01 PROCEDURE — 82271 OCCULT BLOOD OTHER SOURCES: CPT

## 2019-01-01 PROCEDURE — 6360000002 HC RX W HCPCS: Performed by: NURSE PRACTITIONER

## 2019-01-01 PROCEDURE — 71046 X-RAY EXAM CHEST 2 VIEWS: CPT

## 2019-01-01 PROCEDURE — 02HV33Z INSERTION OF INFUSION DEVICE INTO SUPERIOR VENA CAVA, PERCUTANEOUS APPROACH: ICD-10-PCS | Performed by: INTERNAL MEDICINE

## 2019-01-01 PROCEDURE — 82607 VITAMIN B-12: CPT

## 2019-01-01 PROCEDURE — 96375 TX/PRO/DX INJ NEW DRUG ADDON: CPT

## 2019-01-01 PROCEDURE — 97116 GAIT TRAINING THERAPY: CPT

## 2019-01-01 PROCEDURE — 85610 PROTHROMBIN TIME: CPT

## 2019-01-01 PROCEDURE — 93306 TTE W/DOPPLER COMPLETE: CPT

## 2019-01-01 PROCEDURE — 85018 HEMOGLOBIN: CPT

## 2019-01-01 PROCEDURE — 81003 URINALYSIS AUTO W/O SCOPE: CPT

## 2019-01-01 PROCEDURE — 83880 ASSAY OF NATRIURETIC PEPTIDE: CPT

## 2019-01-01 PROCEDURE — 88342 IMHCHEM/IMCYTCHM 1ST ANTB: CPT

## 2019-01-01 PROCEDURE — 82150 ASSAY OF AMYLASE: CPT

## 2019-01-01 PROCEDURE — 76705 ECHO EXAM OF ABDOMEN: CPT

## 2019-01-01 PROCEDURE — 97163 PT EVAL HIGH COMPLEX 45 MIN: CPT

## 2019-01-01 PROCEDURE — P9047 ALBUMIN (HUMAN), 25%, 50ML: HCPCS | Performed by: INTERNAL MEDICINE

## 2019-01-01 PROCEDURE — 71046 X-RAY EXAM CHEST 2 VIEWS: CPT | Performed by: RADIOLOGY

## 2019-01-01 PROCEDURE — 99233 SBSQ HOSP IP/OBS HIGH 50: CPT | Performed by: RADIOLOGY

## 2019-01-01 PROCEDURE — 74176 CT ABD & PELVIS W/O CONTRAST: CPT

## 2019-01-01 PROCEDURE — 82565 ASSAY OF CREATININE: CPT

## 2019-01-01 PROCEDURE — 32555 ASPIRATE PLEURA W/ IMAGING: CPT | Performed by: RADIOLOGY

## 2019-01-01 PROCEDURE — 82330 ASSAY OF CALCIUM: CPT

## 2019-01-01 PROCEDURE — 87880 STREP A ASSAY W/OPTIC: CPT

## 2019-01-01 PROCEDURE — 36415 COLL VENOUS BLD VENIPUNCTURE: CPT

## 2019-01-01 PROCEDURE — 87040 BLOOD CULTURE FOR BACTERIA: CPT

## 2019-01-01 PROCEDURE — 76937 US GUIDE VASCULAR ACCESS: CPT | Performed by: INTERNAL MEDICINE

## 2019-01-01 PROCEDURE — 6360000002 HC RX W HCPCS: Performed by: EMERGENCY MEDICINE

## 2019-01-01 PROCEDURE — 71250 CT THORAX DX C-: CPT

## 2019-01-01 RX ORDER — SODIUM CHLORIDE 9 MG/ML
INJECTION, SOLUTION INTRAVENOUS CONTINUOUS
Status: DISCONTINUED | OUTPATIENT
Start: 2019-01-01 | End: 2019-01-01

## 2019-01-01 RX ORDER — IPRATROPIUM BROMIDE AND ALBUTEROL SULFATE 2.5; .5 MG/3ML; MG/3ML
1 SOLUTION RESPIRATORY (INHALATION)
Status: DISCONTINUED | OUTPATIENT
Start: 2019-01-01 | End: 2019-01-01 | Stop reason: HOSPADM

## 2019-01-01 RX ORDER — 0.9 % SODIUM CHLORIDE 0.9 %
500 INTRAVENOUS SOLUTION INTRAVENOUS ONCE
Status: DISCONTINUED | OUTPATIENT
Start: 2019-01-01 | End: 2019-01-01

## 2019-01-01 RX ORDER — ONDANSETRON 2 MG/ML
4 INJECTION INTRAMUSCULAR; INTRAVENOUS ONCE
Status: COMPLETED | OUTPATIENT
Start: 2019-01-01 | End: 2019-01-01

## 2019-01-01 RX ORDER — SODIUM CHLORIDE 0.9 % (FLUSH) 0.9 %
10 SYRINGE (ML) INJECTION EVERY 12 HOURS SCHEDULED
Status: DISCONTINUED | OUTPATIENT
Start: 2019-01-01 | End: 2019-01-01 | Stop reason: HOSPADM

## 2019-01-01 RX ORDER — ADENOSINE 3 MG/ML
6 INJECTION, SOLUTION INTRAVENOUS ONCE
Status: COMPLETED | OUTPATIENT
Start: 2019-01-01 | End: 2019-01-01

## 2019-01-01 RX ORDER — POTASSIUM CHLORIDE 7.45 MG/ML
10 INJECTION INTRAVENOUS
Status: COMPLETED | OUTPATIENT
Start: 2019-01-01 | End: 2019-01-01

## 2019-01-01 RX ORDER — HEPARIN SODIUM 5000 [USP'U]/ML
5000 INJECTION, SOLUTION INTRAVENOUS; SUBCUTANEOUS EVERY 8 HOURS SCHEDULED
Status: DISCONTINUED | OUTPATIENT
Start: 2019-01-01 | End: 2019-01-01 | Stop reason: HOSPADM

## 2019-01-01 RX ORDER — 0.9 % SODIUM CHLORIDE 0.9 %
500 INTRAVENOUS SOLUTION INTRAVENOUS ONCE
Status: COMPLETED | OUTPATIENT
Start: 2019-01-01 | End: 2019-01-01

## 2019-01-01 RX ORDER — HEPARIN SODIUM 5000 [USP'U]/ML
5000 INJECTION, SOLUTION INTRAVENOUS; SUBCUTANEOUS EVERY 8 HOURS SCHEDULED
Status: DISCONTINUED | OUTPATIENT
Start: 2019-01-01 | End: 2019-01-01

## 2019-01-01 RX ORDER — ALBUTEROL SULFATE 2.5 MG/3ML
2.5 SOLUTION RESPIRATORY (INHALATION)
Status: CANCELLED | OUTPATIENT
Start: 2019-01-01

## 2019-01-01 RX ORDER — MAGNESIUM SULFATE IN WATER 40 MG/ML
2 INJECTION, SOLUTION INTRAVENOUS ONCE
Status: COMPLETED | OUTPATIENT
Start: 2019-01-01 | End: 2019-01-01

## 2019-01-01 RX ORDER — MORPHINE SULFATE 4 MG/ML
4 INJECTION, SOLUTION INTRAMUSCULAR; INTRAVENOUS
Status: CANCELLED | OUTPATIENT
Start: 2019-01-01

## 2019-01-01 RX ORDER — ONDANSETRON 2 MG/ML
4 INJECTION INTRAMUSCULAR; INTRAVENOUS EVERY 6 HOURS PRN
Status: CANCELLED | OUTPATIENT
Start: 2019-01-01

## 2019-01-01 RX ORDER — IPRATROPIUM BROMIDE AND ALBUTEROL SULFATE 2.5; .5 MG/3ML; MG/3ML
1 SOLUTION RESPIRATORY (INHALATION)
Status: DISCONTINUED | OUTPATIENT
Start: 2019-01-01 | End: 2019-01-01

## 2019-01-01 RX ORDER — ASPIRIN 81 MG/1
81 TABLET, CHEWABLE ORAL DAILY
COMMUNITY

## 2019-01-01 RX ORDER — MORPHINE SULFATE 4 MG/ML
10 INJECTION, SOLUTION INTRAMUSCULAR; INTRAVENOUS
Status: DISCONTINUED | OUTPATIENT
Start: 2019-01-01 | End: 2019-01-01 | Stop reason: HOSPADM

## 2019-01-01 RX ORDER — FUROSEMIDE 10 MG/ML
40 INJECTION INTRAMUSCULAR; INTRAVENOUS ONCE
Status: COMPLETED | OUTPATIENT
Start: 2019-01-01 | End: 2019-01-01

## 2019-01-01 RX ORDER — MAGNESIUM HYDROXIDE 1200 MG/15ML
LIQUID ORAL
Status: DISPENSED
Start: 2019-01-01 | End: 2019-01-01

## 2019-01-01 RX ORDER — FUROSEMIDE 10 MG/ML
INJECTION INTRAMUSCULAR; INTRAVENOUS
Status: COMPLETED
Start: 2019-01-01 | End: 2019-01-01

## 2019-01-01 RX ORDER — PANTOPRAZOLE SODIUM 40 MG/10ML
40 INJECTION, POWDER, LYOPHILIZED, FOR SOLUTION INTRAVENOUS 2 TIMES DAILY
Status: DISCONTINUED | OUTPATIENT
Start: 2019-01-01 | End: 2019-01-01 | Stop reason: HOSPADM

## 2019-01-01 RX ORDER — POLYETHYLENE GLYCOL 3350 17 G/17G
17 POWDER, FOR SOLUTION ORAL DAILY
Status: DISCONTINUED | OUTPATIENT
Start: 2019-01-01 | End: 2019-01-01 | Stop reason: HOSPADM

## 2019-01-01 RX ORDER — LEVOFLOXACIN 750 MG/1
750 TABLET ORAL DAILY
Qty: 7 TABLET | Refills: 0 | Status: SHIPPED | OUTPATIENT
Start: 2019-01-01 | End: 2019-01-01

## 2019-01-01 RX ORDER — SODIUM CHLORIDE 0.9 % (FLUSH) 0.9 %
10 SYRINGE (ML) INJECTION PRN
Status: DISCONTINUED | OUTPATIENT
Start: 2019-01-01 | End: 2019-01-01 | Stop reason: HOSPADM

## 2019-01-01 RX ORDER — ALPRAZOLAM 0.25 MG/1
0.25 TABLET ORAL NIGHTLY PRN
Status: CANCELLED | OUTPATIENT
Start: 2019-01-01

## 2019-01-01 RX ORDER — MORPHINE SULFATE 4 MG/ML
10 INJECTION, SOLUTION INTRAMUSCULAR; INTRAVENOUS
Status: CANCELLED | OUTPATIENT
Start: 2019-01-01

## 2019-01-01 RX ORDER — ACETAMINOPHEN 325 MG/1
650 TABLET ORAL EVERY 4 HOURS PRN
Status: DISCONTINUED | OUTPATIENT
Start: 2019-01-01 | End: 2019-01-01 | Stop reason: HOSPADM

## 2019-01-01 RX ORDER — LIDOCAINE HYDROCHLORIDE 20 MG/ML
INJECTION, SOLUTION INFILTRATION; PERINEURAL
Status: DISCONTINUED
Start: 2019-01-01 | End: 2019-01-01 | Stop reason: WASHOUT

## 2019-01-01 RX ORDER — ALBUTEROL SULFATE 2.5 MG/3ML
2.5 SOLUTION RESPIRATORY (INHALATION)
Status: DISCONTINUED | OUTPATIENT
Start: 2019-01-01 | End: 2019-01-01 | Stop reason: HOSPADM

## 2019-01-01 RX ORDER — 0.9 % SODIUM CHLORIDE 0.9 %
250 INTRAVENOUS SOLUTION INTRAVENOUS ONCE
Status: COMPLETED | OUTPATIENT
Start: 2019-01-01 | End: 2019-01-01

## 2019-01-01 RX ORDER — PHENOL 1.4 %
600 AEROSOL, SPRAY (ML) MUCOUS MEMBRANE DAILY
Status: CANCELLED | OUTPATIENT
Start: 2019-01-01

## 2019-01-01 RX ORDER — LISINOPRIL AND HYDROCHLOROTHIAZIDE 25; 20 MG/1; MG/1
1 TABLET ORAL DAILY
COMMUNITY

## 2019-01-01 RX ORDER — LIDOCAINE HYDROCHLORIDE 20 MG/ML
INJECTION, SOLUTION INFILTRATION; PERINEURAL
Status: COMPLETED | OUTPATIENT
Start: 2019-01-01 | End: 2019-01-01

## 2019-01-01 RX ORDER — CALCIUM CARBONATE 500(1250)
500 TABLET ORAL DAILY
Status: DISCONTINUED | OUTPATIENT
Start: 2019-01-01 | End: 2019-01-01 | Stop reason: HOSPADM

## 2019-01-01 RX ORDER — ONDANSETRON 2 MG/ML
2 INJECTION INTRAMUSCULAR; INTRAVENOUS EVERY 4 HOURS PRN
Status: CANCELLED | OUTPATIENT
Start: 2019-01-01

## 2019-01-01 RX ORDER — SIMVASTATIN 5 MG
5 TABLET ORAL NIGHTLY
Status: CANCELLED | OUTPATIENT
Start: 2019-01-01

## 2019-01-01 RX ORDER — ACETAMINOPHEN 325 MG/1
650 TABLET ORAL EVERY 6 HOURS PRN
Status: CANCELLED | OUTPATIENT
Start: 2019-01-01

## 2019-01-01 RX ORDER — 0.9 % SODIUM CHLORIDE 0.9 %
10 VIAL (ML) INJECTION DAILY
Status: DISCONTINUED | OUTPATIENT
Start: 2019-01-01 | End: 2019-01-01

## 2019-01-01 RX ORDER — ASPIRIN 81 MG/1
81 TABLET, CHEWABLE ORAL DAILY
Status: DISCONTINUED | OUTPATIENT
Start: 2019-01-01 | End: 2019-01-01 | Stop reason: HOSPADM

## 2019-01-01 RX ORDER — ASPIRIN 81 MG/1
81 TABLET, CHEWABLE ORAL DAILY
Status: DISCONTINUED | OUTPATIENT
Start: 2019-01-01 | End: 2019-01-01

## 2019-01-01 RX ORDER — POTASSIUM BICARBONATE 25 MEQ/1
25 TABLET, EFFERVESCENT ORAL 2 TIMES DAILY
Status: DISCONTINUED | OUTPATIENT
Start: 2019-01-01 | End: 2019-01-01 | Stop reason: HOSPADM

## 2019-01-01 RX ORDER — FUROSEMIDE 20 MG/1
10 TABLET ORAL DAILY
COMMUNITY

## 2019-01-01 RX ORDER — IPRATROPIUM BROMIDE AND ALBUTEROL SULFATE 2.5; .5 MG/3ML; MG/3ML
1 SOLUTION RESPIRATORY (INHALATION) EVERY 4 HOURS PRN
Status: DISCONTINUED | OUTPATIENT
Start: 2019-01-01 | End: 2019-01-01 | Stop reason: HOSPADM

## 2019-01-01 RX ORDER — POTASSIUM CHLORIDE 20 MEQ/1
20 TABLET, EXTENDED RELEASE ORAL 2 TIMES DAILY
Status: DISCONTINUED | OUTPATIENT
Start: 2019-01-01 | End: 2019-01-01 | Stop reason: HOSPADM

## 2019-01-01 RX ORDER — MORPHINE SULFATE 2 MG/ML
2 INJECTION, SOLUTION INTRAMUSCULAR; INTRAVENOUS
Status: DISCONTINUED | OUTPATIENT
Start: 2019-01-01 | End: 2019-01-01

## 2019-01-01 RX ORDER — ETOMIDATE 2 MG/ML
10 INJECTION INTRAVENOUS ONCE
Status: DISCONTINUED | OUTPATIENT
Start: 2019-01-01 | End: 2019-01-01 | Stop reason: HOSPADM

## 2019-01-01 RX ORDER — CLOPIDOGREL BISULFATE 75 MG/1
75 TABLET ORAL ONCE
Status: CANCELLED | OUTPATIENT
Start: 2019-01-01

## 2019-01-01 RX ORDER — IPRATROPIUM BROMIDE AND ALBUTEROL SULFATE 2.5; .5 MG/3ML; MG/3ML
1 SOLUTION RESPIRATORY (INHALATION) 4 TIMES DAILY
Status: DISCONTINUED | OUTPATIENT
Start: 2019-01-01 | End: 2019-01-01

## 2019-01-01 RX ORDER — MORPHINE SULFATE 4 MG/ML
4 INJECTION, SOLUTION INTRAMUSCULAR; INTRAVENOUS
Status: DISCONTINUED | OUTPATIENT
Start: 2019-01-01 | End: 2019-01-01 | Stop reason: HOSPADM

## 2019-01-01 RX ORDER — SIMVASTATIN 5 MG
5 TABLET ORAL NIGHTLY
COMMUNITY

## 2019-01-01 RX ORDER — 0.9 % SODIUM CHLORIDE 0.9 %
250 INTRAVENOUS SOLUTION INTRAVENOUS ONCE
Status: DISCONTINUED | OUTPATIENT
Start: 2019-01-01 | End: 2019-01-01 | Stop reason: HOSPADM

## 2019-01-01 RX ORDER — IPRATROPIUM BROMIDE AND ALBUTEROL SULFATE 2.5; .5 MG/3ML; MG/3ML
1 SOLUTION RESPIRATORY (INHALATION) EVERY 4 HOURS PRN
Status: CANCELLED | OUTPATIENT
Start: 2019-01-01

## 2019-01-01 RX ORDER — SODIUM CHLORIDE 0.9 % (FLUSH) 0.9 %
10 SYRINGE (ML) INJECTION EVERY 12 HOURS SCHEDULED
Status: CANCELLED | OUTPATIENT
Start: 2019-01-01

## 2019-01-01 RX ORDER — SIMVASTATIN 10 MG
5 TABLET ORAL NIGHTLY
Status: DISCONTINUED | OUTPATIENT
Start: 2019-01-01 | End: 2019-01-01 | Stop reason: HOSPADM

## 2019-01-01 RX ORDER — ALBUMIN (HUMAN) 12.5 G/50ML
50 SOLUTION INTRAVENOUS ONCE
Status: COMPLETED | OUTPATIENT
Start: 2019-01-01 | End: 2019-01-01

## 2019-01-01 RX ORDER — LEVOFLOXACIN 5 MG/ML
750 INJECTION, SOLUTION INTRAVENOUS
Status: CANCELLED | OUTPATIENT
Start: 2019-01-01

## 2019-01-01 RX ORDER — ONDANSETRON 2 MG/ML
4 INJECTION INTRAMUSCULAR; INTRAVENOUS EVERY 6 HOURS PRN
Status: DISCONTINUED | OUTPATIENT
Start: 2019-01-01 | End: 2019-01-01 | Stop reason: HOSPADM

## 2019-01-01 RX ORDER — HEPARIN SODIUM 5000 [USP'U]/ML
5000 INJECTION, SOLUTION INTRAVENOUS; SUBCUTANEOUS 2 TIMES DAILY
Status: CANCELLED | OUTPATIENT
Start: 2019-01-01

## 2019-01-01 RX ORDER — ASPIRIN 81 MG/1
81 TABLET, CHEWABLE ORAL DAILY
Status: CANCELLED | OUTPATIENT
Start: 2019-01-01

## 2019-01-01 RX ORDER — SODIUM CHLORIDE 0.9 % (FLUSH) 0.9 %
10 SYRINGE (ML) INJECTION PRN
Status: CANCELLED | OUTPATIENT
Start: 2019-01-01

## 2019-01-01 RX ORDER — PROPOFOL 10 MG/ML
INJECTION, EMULSION INTRAVENOUS
Status: DISPENSED
Start: 2019-01-01 | End: 2019-01-01

## 2019-01-01 RX ORDER — POTASSIUM CHLORIDE 20 MEQ/1
20 TABLET, EXTENDED RELEASE ORAL 2 TIMES DAILY
COMMUNITY

## 2019-01-01 RX ORDER — VANCOMYCIN HYDROCHLORIDE 500 MG/100ML
15 INJECTION, SOLUTION INTRAVENOUS
Status: DISCONTINUED | OUTPATIENT
Start: 2019-01-01 | End: 2019-01-01 | Stop reason: DRUGHIGH

## 2019-01-01 RX ORDER — LIDOCAINE HYDROCHLORIDE 20 MG/ML
5 INJECTION, SOLUTION INFILTRATION; PERINEURAL
Status: CANCELLED | OUTPATIENT
Start: 2019-01-01

## 2019-01-01 RX ORDER — POTASSIUM CHLORIDE 7.45 MG/ML
10 INJECTION INTRAVENOUS
Status: DISCONTINUED | OUTPATIENT
Start: 2019-01-01 | End: 2019-01-01

## 2019-01-01 RX ORDER — PANTOPRAZOLE SODIUM 40 MG/10ML
40 INJECTION, POWDER, LYOPHILIZED, FOR SOLUTION INTRAVENOUS DAILY
Status: DISCONTINUED | OUTPATIENT
Start: 2019-01-01 | End: 2019-01-01

## 2019-01-01 RX ORDER — ALPRAZOLAM 0.25 MG/1
0.25 TABLET ORAL NIGHTLY PRN
Status: DISCONTINUED | OUTPATIENT
Start: 2019-01-01 | End: 2019-01-01 | Stop reason: HOSPADM

## 2019-01-01 RX ORDER — LIDOCAINE HYDROCHLORIDE 20 MG/ML
5 INJECTION, SOLUTION INFILTRATION; PERINEURAL
Status: DISCONTINUED | OUTPATIENT
Start: 2019-01-01 | End: 2019-01-01 | Stop reason: HOSPADM

## 2019-01-01 RX ORDER — DOCUSATE SODIUM 100 MG/1
100 CAPSULE, LIQUID FILLED ORAL 2 TIMES DAILY
Status: DISCONTINUED | OUTPATIENT
Start: 2019-01-01 | End: 2019-01-01 | Stop reason: HOSPADM

## 2019-01-01 RX ORDER — 0.9 % SODIUM CHLORIDE 0.9 %
10 VIAL (ML) INJECTION 2 TIMES DAILY
Status: DISCONTINUED | OUTPATIENT
Start: 2019-01-01 | End: 2019-01-01 | Stop reason: HOSPADM

## 2019-01-01 RX ORDER — FENTANYL CITRATE 50 UG/ML
INJECTION, SOLUTION INTRAMUSCULAR; INTRAVENOUS
Status: DISPENSED
Start: 2019-01-01 | End: 2019-01-01

## 2019-01-01 RX ADMIN — Medication 10 ML: at 09:28

## 2019-01-01 RX ADMIN — Medication 10 MEQ: at 10:06

## 2019-01-01 RX ADMIN — MORPHINE SULFATE 4 MG: 4 INJECTION INTRAVENOUS at 13:23

## 2019-01-01 RX ADMIN — Medication 10 ML: at 20:33

## 2019-01-01 RX ADMIN — SODIUM CHLORIDE 75 ML/HR: 9 INJECTION, SOLUTION INTRAVENOUS at 05:56

## 2019-01-01 RX ADMIN — IPRATROPIUM BROMIDE AND ALBUTEROL SULFATE 1 AMPULE: .5; 3 SOLUTION RESPIRATORY (INHALATION) at 09:32

## 2019-01-01 RX ADMIN — PANTOPRAZOLE SODIUM 40 MG: 40 INJECTION, POWDER, FOR SOLUTION INTRAVENOUS at 21:45

## 2019-01-01 RX ADMIN — PANTOPRAZOLE SODIUM 40 MG: 40 INJECTION, POWDER, FOR SOLUTION INTRAVENOUS at 00:47

## 2019-01-01 RX ADMIN — IPRATROPIUM BROMIDE AND ALBUTEROL SULFATE 1 AMPULE: .5; 3 SOLUTION RESPIRATORY (INHALATION) at 04:10

## 2019-01-01 RX ADMIN — ONDANSETRON 4 MG: 2 INJECTION INTRAMUSCULAR; INTRAVENOUS at 20:46

## 2019-01-01 RX ADMIN — HEPARIN SODIUM 5000 UNITS: 5000 INJECTION INTRAVENOUS; SUBCUTANEOUS at 14:15

## 2019-01-01 RX ADMIN — CEFTRIAXONE 1 G: 1 INJECTION, POWDER, FOR SOLUTION INTRAMUSCULAR; INTRAVENOUS at 20:45

## 2019-01-01 RX ADMIN — IPRATROPIUM BROMIDE AND ALBUTEROL SULFATE 1 AMPULE: .5; 3 SOLUTION RESPIRATORY (INHALATION) at 11:31

## 2019-01-01 RX ADMIN — HEPARIN SODIUM 5000 UNITS: 5000 INJECTION INTRAVENOUS; SUBCUTANEOUS at 16:32

## 2019-01-01 RX ADMIN — PANTOPRAZOLE SODIUM 40 MG: 40 INJECTION, POWDER, FOR SOLUTION INTRAVENOUS at 22:03

## 2019-01-01 RX ADMIN — AZITHROMYCIN MONOHYDRATE 500 MG: 500 INJECTION, POWDER, LYOPHILIZED, FOR SOLUTION INTRAVENOUS at 21:16

## 2019-01-01 RX ADMIN — HEPARIN SODIUM 5000 UNITS: 5000 INJECTION INTRAVENOUS; SUBCUTANEOUS at 05:22

## 2019-01-01 RX ADMIN — Medication 10 ML: at 20:40

## 2019-01-01 RX ADMIN — POTASSIUM CHLORIDE 20 MEQ: 20 TABLET, EXTENDED RELEASE ORAL at 20:32

## 2019-01-01 RX ADMIN — IPRATROPIUM BROMIDE AND ALBUTEROL SULFATE 1 AMPULE: .5; 3 SOLUTION RESPIRATORY (INHALATION) at 15:24

## 2019-01-01 RX ADMIN — IPRATROPIUM BROMIDE AND ALBUTEROL SULFATE 1 AMPULE: .5; 3 SOLUTION RESPIRATORY (INHALATION) at 21:54

## 2019-01-01 RX ADMIN — POTASSIUM CHLORIDE 20 MEQ: 20 TABLET, EXTENDED RELEASE ORAL at 22:02

## 2019-01-01 RX ADMIN — PIPERACILLIN SODIUM,TAZOBACTAM SODIUM 3.38 G: 3; .375 INJECTION, POWDER, FOR SOLUTION INTRAVENOUS at 17:17

## 2019-01-01 RX ADMIN — Medication 10 ML: at 09:19

## 2019-01-01 RX ADMIN — Medication 10 ML: at 08:44

## 2019-01-01 RX ADMIN — HEPARIN SODIUM 5000 UNITS: 5000 INJECTION INTRAVENOUS; SUBCUTANEOUS at 14:42

## 2019-01-01 RX ADMIN — PANTOPRAZOLE SODIUM 40 MG: 40 INJECTION, POWDER, FOR SOLUTION INTRAVENOUS at 20:36

## 2019-01-01 RX ADMIN — Medication 10 MEQ: at 04:50

## 2019-01-01 RX ADMIN — MORPHINE SULFATE 2 MG: 2 INJECTION, SOLUTION INTRAMUSCULAR; INTRAVENOUS at 07:15

## 2019-01-01 RX ADMIN — ONDANSETRON 4 MG: 2 INJECTION INTRAMUSCULAR; INTRAVENOUS at 19:57

## 2019-01-01 RX ADMIN — POLYETHYLENE GLYCOL 3350 17 G: 17 POWDER, FOR SOLUTION ORAL at 12:18

## 2019-01-01 RX ADMIN — ONDANSETRON 4 MG: 2 INJECTION INTRAMUSCULAR; INTRAVENOUS at 03:24

## 2019-01-01 RX ADMIN — HEPARIN SODIUM 5000 UNITS: 5000 INJECTION INTRAVENOUS; SUBCUTANEOUS at 21:45

## 2019-01-01 RX ADMIN — ASPIRIN 81 MG 81 MG: 81 TABLET ORAL at 08:35

## 2019-01-01 RX ADMIN — PANTOPRAZOLE SODIUM 40 MG: 40 INJECTION, POWDER, FOR SOLUTION INTRAVENOUS at 09:43

## 2019-01-01 RX ADMIN — PIPERACILLIN SODIUM,TAZOBACTAM SODIUM 3.38 G: 3; .375 INJECTION, POWDER, FOR SOLUTION INTRAVENOUS at 02:19

## 2019-01-01 RX ADMIN — PANTOPRAZOLE SODIUM 40 MG: 40 INJECTION, POWDER, FOR SOLUTION INTRAVENOUS at 10:31

## 2019-01-01 RX ADMIN — DOCUSATE SODIUM 100 MG: 100 CAPSULE, LIQUID FILLED ORAL at 12:18

## 2019-01-01 RX ADMIN — NOREPINEPHRINE BITARTRATE 3 MCG/MIN: 1 INJECTION INTRAVENOUS at 00:20

## 2019-01-01 RX ADMIN — IPRATROPIUM BROMIDE AND ALBUTEROL SULFATE 1 AMPULE: .5; 3 SOLUTION RESPIRATORY (INHALATION) at 14:03

## 2019-01-01 RX ADMIN — ADENOSINE 6 MG: 3 INJECTION, SOLUTION INTRAVENOUS at 22:29

## 2019-01-01 RX ADMIN — PIPERACILLIN SODIUM,TAZOBACTAM SODIUM 3.38 G: 3; .375 INJECTION, POWDER, FOR SOLUTION INTRAVENOUS at 17:12

## 2019-01-01 RX ADMIN — PANTOPRAZOLE SODIUM 40 MG: 40 INJECTION, POWDER, FOR SOLUTION INTRAVENOUS at 20:32

## 2019-01-01 RX ADMIN — HEPARIN SODIUM 5000 UNITS: 5000 INJECTION INTRAVENOUS; SUBCUTANEOUS at 05:55

## 2019-01-01 RX ADMIN — PIPERACILLIN SODIUM,TAZOBACTAM SODIUM 3.38 G: 3; .375 INJECTION, POWDER, FOR SOLUTION INTRAVENOUS at 00:47

## 2019-01-01 RX ADMIN — DOCUSATE SODIUM 100 MG: 100 CAPSULE, LIQUID FILLED ORAL at 08:35

## 2019-01-01 RX ADMIN — Medication 10 ML: at 09:44

## 2019-01-01 RX ADMIN — Medication 10 ML: at 21:18

## 2019-01-01 RX ADMIN — SODIUM CHLORIDE 250 ML: 9 INJECTION, SOLUTION INTRAVENOUS at 20:45

## 2019-01-01 RX ADMIN — IPRATROPIUM BROMIDE AND ALBUTEROL SULFATE 1 AMPULE: .5; 3 SOLUTION RESPIRATORY (INHALATION) at 14:45

## 2019-01-01 RX ADMIN — VANCOMYCIN HYDROCHLORIDE 750 MG: 750 INJECTION, POWDER, LYOPHILIZED, FOR SOLUTION INTRAVENOUS at 05:00

## 2019-01-01 RX ADMIN — IPRATROPIUM BROMIDE AND ALBUTEROL SULFATE 1 AMPULE: .5; 3 SOLUTION RESPIRATORY (INHALATION) at 10:55

## 2019-01-01 RX ADMIN — PIPERACILLIN SODIUM,TAZOBACTAM SODIUM 3.38 G: 3; .375 INJECTION, POWDER, FOR SOLUTION INTRAVENOUS at 01:11

## 2019-01-01 RX ADMIN — SIMVASTATIN 5 MG: 10 TABLET, FILM COATED ORAL at 22:02

## 2019-01-01 RX ADMIN — SIMVASTATIN 5 MG: 10 TABLET, FILM COATED ORAL at 21:44

## 2019-01-01 RX ADMIN — PIPERACILLIN SODIUM,TAZOBACTAM SODIUM 3.38 G: 3; .375 INJECTION, POWDER, FOR SOLUTION INTRAVENOUS at 18:55

## 2019-01-01 RX ADMIN — IPRATROPIUM BROMIDE AND ALBUTEROL SULFATE 1 AMPULE: .5; 3 SOLUTION RESPIRATORY (INHALATION) at 16:50

## 2019-01-01 RX ADMIN — IPRATROPIUM BROMIDE AND ALBUTEROL SULFATE 1 AMPULE: .5; 3 SOLUTION RESPIRATORY (INHALATION) at 19:06

## 2019-01-01 RX ADMIN — Medication 10 ML: at 21:46

## 2019-01-01 RX ADMIN — Medication 10 MEQ: at 06:14

## 2019-01-01 RX ADMIN — PIPERACILLIN SODIUM,TAZOBACTAM SODIUM 3.38 G: 3; .375 INJECTION, POWDER, FOR SOLUTION INTRAVENOUS at 08:43

## 2019-01-01 RX ADMIN — HEPARIN SODIUM 5000 UNITS: 5000 INJECTION, SOLUTION INTRAVENOUS; SUBCUTANEOUS at 05:46

## 2019-01-01 RX ADMIN — DOCUSATE SODIUM 100 MG: 100 CAPSULE, LIQUID FILLED ORAL at 22:02

## 2019-01-01 RX ADMIN — HYDROMORPHONE HYDROCHLORIDE 0.5 MG: 1 INJECTION, SOLUTION INTRAMUSCULAR; INTRAVENOUS; SUBCUTANEOUS at 21:38

## 2019-01-01 RX ADMIN — PANTOPRAZOLE SODIUM 40 MG: 40 INJECTION, POWDER, FOR SOLUTION INTRAVENOUS at 11:25

## 2019-01-01 RX ADMIN — IPRATROPIUM BROMIDE AND ALBUTEROL SULFATE 1 AMPULE: .5; 3 SOLUTION RESPIRATORY (INHALATION) at 21:30

## 2019-01-01 RX ADMIN — CALCIUM 500 MG: 500 TABLET ORAL at 08:35

## 2019-01-01 RX ADMIN — Medication 10 MEQ: at 08:47

## 2019-01-01 RX ADMIN — IPRATROPIUM BROMIDE AND ALBUTEROL SULFATE 1 AMPULE: .5; 3 SOLUTION RESPIRATORY (INHALATION) at 19:59

## 2019-01-01 RX ADMIN — POTASSIUM CHLORIDE 10 MEQ: 7.46 INJECTION, SOLUTION INTRAVENOUS at 01:23

## 2019-01-01 RX ADMIN — PIPERACILLIN SODIUM,TAZOBACTAM SODIUM 3.38 G: 3; .375 INJECTION, POWDER, FOR SOLUTION INTRAVENOUS at 11:25

## 2019-01-01 RX ADMIN — IPRATROPIUM BROMIDE AND ALBUTEROL SULFATE 1 AMPULE: .5; 3 SOLUTION RESPIRATORY (INHALATION) at 04:21

## 2019-01-01 RX ADMIN — VANCOMYCIN HYDROCHLORIDE 750 MG: 750 INJECTION, POWDER, LYOPHILIZED, FOR SOLUTION INTRAVENOUS at 23:19

## 2019-01-01 RX ADMIN — SIMVASTATIN 5 MG: 10 TABLET, FILM COATED ORAL at 20:32

## 2019-01-01 RX ADMIN — Medication 10 ML: at 08:35

## 2019-01-01 RX ADMIN — PANTOPRAZOLE SODIUM 40 MG: 40 INJECTION, POWDER, FOR SOLUTION INTRAVENOUS at 08:44

## 2019-01-01 RX ADMIN — HEPARIN SODIUM 5000 UNITS: 5000 INJECTION INTRAVENOUS; SUBCUTANEOUS at 22:02

## 2019-01-01 RX ADMIN — Medication 10 MEQ: at 09:07

## 2019-01-01 RX ADMIN — Medication 10 ML: at 21:17

## 2019-01-01 RX ADMIN — PIPERACILLIN SODIUM,TAZOBACTAM SODIUM 3.38 G: 3; .375 INJECTION, POWDER, FOR SOLUTION INTRAVENOUS at 09:07

## 2019-01-01 RX ADMIN — PIPERACILLIN SODIUM,TAZOBACTAM SODIUM 3.38 G: 3; .375 INJECTION, POWDER, FOR SOLUTION INTRAVENOUS at 01:35

## 2019-01-01 RX ADMIN — IPRATROPIUM BROMIDE AND ALBUTEROL SULFATE 1 AMPULE: .5; 3 SOLUTION RESPIRATORY (INHALATION) at 13:58

## 2019-01-01 RX ADMIN — VITAMIN D, TAB 1000IU (100/BT) 1000 UNITS: 25 TAB at 08:35

## 2019-01-01 RX ADMIN — Medication 10 MEQ: at 07:48

## 2019-01-01 RX ADMIN — VANCOMYCIN HYDROCHLORIDE 750 MG: 750 INJECTION, POWDER, LYOPHILIZED, FOR SOLUTION INTRAVENOUS at 10:31

## 2019-01-01 RX ADMIN — HEPARIN SODIUM 5000 UNITS: 5000 INJECTION INTRAVENOUS; SUBCUTANEOUS at 21:49

## 2019-01-01 RX ADMIN — ONDANSETRON 4 MG: 2 INJECTION INTRAMUSCULAR; INTRAVENOUS at 19:29

## 2019-01-01 RX ADMIN — IOPAMIDOL 100 ML: 755 INJECTION, SOLUTION INTRAVENOUS at 12:41

## 2019-01-01 RX ADMIN — MORPHINE SULFATE 2 MG: 2 INJECTION, SOLUTION INTRAMUSCULAR; INTRAVENOUS at 03:59

## 2019-01-01 RX ADMIN — VANCOMYCIN HYDROCHLORIDE 750 MG: 750 INJECTION, POWDER, LYOPHILIZED, FOR SOLUTION INTRAVENOUS at 10:39

## 2019-01-01 RX ADMIN — HEPARIN SODIUM 5000 UNITS: 5000 INJECTION INTRAVENOUS; SUBCUTANEOUS at 13:42

## 2019-01-01 RX ADMIN — SODIUM CHLORIDE 500 ML: 9 INJECTION, SOLUTION INTRAVENOUS at 19:30

## 2019-01-01 RX ADMIN — IPRATROPIUM BROMIDE AND ALBUTEROL SULFATE 1 AMPULE: .5; 3 SOLUTION RESPIRATORY (INHALATION) at 20:26

## 2019-01-01 RX ADMIN — IPRATROPIUM BROMIDE AND ALBUTEROL SULFATE 1 AMPULE: .5; 3 SOLUTION RESPIRATORY (INHALATION) at 03:58

## 2019-01-01 RX ADMIN — POTASSIUM CHLORIDE 20 MEQ: 20 TABLET, EXTENDED RELEASE ORAL at 08:35

## 2019-01-01 RX ADMIN — VANCOMYCIN HYDROCHLORIDE 750 MG: 750 INJECTION, POWDER, LYOPHILIZED, FOR SOLUTION INTRAVENOUS at 21:49

## 2019-01-01 RX ADMIN — PANTOPRAZOLE SODIUM 40 MG: 40 INJECTION, POWDER, FOR SOLUTION INTRAVENOUS at 09:19

## 2019-01-01 RX ADMIN — PIPERACILLIN SODIUM,TAZOBACTAM SODIUM 3.38 G: 3; .375 INJECTION, POWDER, FOR SOLUTION INTRAVENOUS at 08:48

## 2019-01-01 RX ADMIN — FUROSEMIDE 40 MG: 10 INJECTION, SOLUTION INTRAMUSCULAR; INTRAVENOUS at 02:08

## 2019-01-01 RX ADMIN — PIPERACILLIN SODIUM,TAZOBACTAM SODIUM 3.38 G: 3; .375 INJECTION, POWDER, FOR SOLUTION INTRAVENOUS at 01:34

## 2019-01-01 RX ADMIN — SODIUM CHLORIDE: 9 INJECTION, SOLUTION INTRAVENOUS at 13:56

## 2019-01-01 RX ADMIN — MAGNESIUM SULFATE HEPTAHYDRATE 2 G: 40 INJECTION, SOLUTION INTRAVENOUS at 14:42

## 2019-01-01 RX ADMIN — PIPERACILLIN SODIUM,TAZOBACTAM SODIUM 3.38 G: 3; .375 INJECTION, POWDER, FOR SOLUTION INTRAVENOUS at 09:44

## 2019-01-01 RX ADMIN — LIDOCAINE HYDROCHLORIDE 5 ML: 20 INJECTION, SOLUTION INFILTRATION; PERINEURAL at 10:17

## 2019-01-01 RX ADMIN — FUROSEMIDE 40 MG: 10 INJECTION, SOLUTION INTRAMUSCULAR; INTRAVENOUS at 07:43

## 2019-01-01 RX ADMIN — PIPERACILLIN SODIUM,TAZOBACTAM SODIUM 3.38 G: 3; .375 INJECTION, POWDER, FOR SOLUTION INTRAVENOUS at 02:23

## 2019-01-01 RX ADMIN — Medication 10 ML: at 21:50

## 2019-01-01 RX ADMIN — VANCOMYCIN HYDROCHLORIDE 750 MG: 750 INJECTION, POWDER, LYOPHILIZED, FOR SOLUTION INTRAVENOUS at 09:07

## 2019-01-01 RX ADMIN — Medication 10 MEQ: at 10:33

## 2019-01-01 RX ADMIN — MORPHINE SULFATE 2 MG: 2 INJECTION, SOLUTION INTRAMUSCULAR; INTRAVENOUS at 20:00

## 2019-01-01 RX ADMIN — SIMVASTATIN 5 MG: 10 TABLET, FILM COATED ORAL at 20:36

## 2019-01-01 RX ADMIN — VANCOMYCIN HYDROCHLORIDE 500 MG: 500 INJECTION, SOLUTION INTRAVENOUS at 05:13

## 2019-01-01 RX ADMIN — MAGNESIUM SULFATE HEPTAHYDRATE 2 G: 40 INJECTION, SOLUTION INTRAVENOUS at 02:48

## 2019-01-01 RX ADMIN — Medication 10 MEQ: at 11:58

## 2019-01-01 RX ADMIN — ALBUMIN (HUMAN) 50 G: 0.25 INJECTION, SOLUTION INTRAVENOUS at 11:52

## 2019-01-01 RX ADMIN — DOCUSATE SODIUM 100 MG: 100 CAPSULE, LIQUID FILLED ORAL at 20:32

## 2019-01-01 RX ADMIN — VANCOMYCIN HYDROCHLORIDE 750 MG: 750 INJECTION, POWDER, LYOPHILIZED, FOR SOLUTION INTRAVENOUS at 09:25

## 2019-01-01 RX ADMIN — PIPERACILLIN SODIUM,TAZOBACTAM SODIUM 3.38 G: 3; .375 INJECTION, POWDER, FOR SOLUTION INTRAVENOUS at 18:32

## 2019-01-01 RX ADMIN — HEPARIN SODIUM 5000 UNITS: 5000 INJECTION INTRAVENOUS; SUBCUTANEOUS at 05:43

## 2019-01-01 RX ADMIN — DOCUSATE SODIUM 100 MG: 100 CAPSULE, LIQUID FILLED ORAL at 21:44

## 2019-01-01 RX ADMIN — PIPERACILLIN SODIUM,TAZOBACTAM SODIUM 3.38 G: 3; .375 INJECTION, POWDER, FOR SOLUTION INTRAVENOUS at 09:02

## 2019-01-01 RX ADMIN — IPRATROPIUM BROMIDE AND ALBUTEROL SULFATE 1 AMPULE: .5; 3 SOLUTION RESPIRATORY (INHALATION) at 20:20

## 2019-01-01 RX ADMIN — Medication 10 ML: at 08:45

## 2019-01-01 RX ADMIN — PIPERACILLIN SODIUM,TAZOBACTAM SODIUM 3.38 G: 3; .375 INJECTION, POWDER, FOR SOLUTION INTRAVENOUS at 17:01

## 2019-01-01 RX ADMIN — ADENOSINE 6 MG: 3 INJECTION, SOLUTION INTRAVENOUS at 04:51

## 2019-01-01 RX ADMIN — Medication 10 ML: at 10:00

## 2019-01-01 RX ADMIN — PANTOPRAZOLE SODIUM 40 MG: 40 INJECTION, POWDER, FOR SOLUTION INTRAVENOUS at 08:35

## 2019-01-01 RX ADMIN — SODIUM CHLORIDE: 9 INJECTION, SOLUTION INTRAVENOUS at 01:23

## 2019-01-01 RX ADMIN — POLYETHYLENE GLYCOL 3350 17 G: 17 POWDER, FOR SOLUTION ORAL at 08:35

## 2019-01-01 RX ADMIN — Medication 10 ML: at 11:26

## 2019-01-01 RX ADMIN — PANTOPRAZOLE SODIUM 40 MG: 40 INJECTION, POWDER, FOR SOLUTION INTRAVENOUS at 21:17

## 2019-01-01 RX ADMIN — Medication 10 ML: at 20:00

## 2019-01-01 RX ADMIN — SODIUM CHLORIDE: 9 INJECTION, SOLUTION INTRAVENOUS at 05:04

## 2019-01-01 RX ADMIN — IPRATROPIUM BROMIDE AND ALBUTEROL SULFATE 1 AMPULE: .5; 3 SOLUTION RESPIRATORY (INHALATION) at 04:32

## 2019-01-01 RX ADMIN — HEPARIN SODIUM 5000 UNITS: 5000 INJECTION INTRAVENOUS; SUBCUTANEOUS at 00:47

## 2019-01-01 RX ADMIN — PIPERACILLIN SODIUM,TAZOBACTAM SODIUM 3.38 G: 3; .375 INJECTION, POWDER, FOR SOLUTION INTRAVENOUS at 17:25

## 2019-01-01 RX ADMIN — Medication 10 MEQ: at 02:31

## 2019-01-01 RX ADMIN — FUROSEMIDE 40 MG: 10 INJECTION INTRAMUSCULAR; INTRAVENOUS at 02:08

## 2019-01-01 RX ADMIN — POTASSIUM CHLORIDE 20 MEQ: 20 TABLET, EXTENDED RELEASE ORAL at 21:45

## 2019-01-01 RX ADMIN — IPRATROPIUM BROMIDE AND ALBUTEROL SULFATE 1 AMPULE: .5; 3 SOLUTION RESPIRATORY (INHALATION) at 08:59

## 2019-01-01 RX ADMIN — PIPERACILLIN SODIUM,TAZOBACTAM SODIUM 3.38 G: 3; .375 INJECTION, POWDER, FOR SOLUTION INTRAVENOUS at 01:17

## 2019-01-01 RX ADMIN — HEPARIN SODIUM 5000 UNITS: 5000 INJECTION INTRAVENOUS; SUBCUTANEOUS at 05:46

## 2019-01-01 RX ADMIN — HEPARIN SODIUM 5000 UNITS: 5000 INJECTION INTRAVENOUS; SUBCUTANEOUS at 21:09

## 2019-01-01 RX ADMIN — Medication 10 MEQ: at 03:35

## 2019-01-01 RX ADMIN — Medication 10 ML: at 08:36

## 2019-01-01 ASSESSMENT — PAIN SCALES - GENERAL
PAINLEVEL_OUTOF10: 7
PAINLEVEL_OUTOF10: 0
PAINLEVEL_OUTOF10: 7
PAINLEVEL_OUTOF10: 0
PAINLEVEL_OUTOF10: 6
PAINLEVEL_OUTOF10: 0
PAINLEVEL_OUTOF10: 7
PAINLEVEL_OUTOF10: 0
PAINLEVEL_OUTOF10: 6
PAINLEVEL_OUTOF10: 0
PAINLEVEL_OUTOF10: 0
PAINLEVEL_OUTOF10: 4
PAINLEVEL_OUTOF10: 0

## 2019-01-01 ASSESSMENT — ENCOUNTER SYMPTOMS
STRIDOR: 0
EYES NEGATIVE: 1
NAUSEA: 0
SHORTNESS OF BREATH: 1
CHEST TIGHTNESS: 0
BLOOD IN STOOL: 0
GASTROINTESTINAL NEGATIVE: 1
BLOOD IN STOOL: 0
SHORTNESS OF BREATH: 1
BLOOD IN STOOL: 0
VOMITING: 0
GASTROINTESTINAL NEGATIVE: 1
STRIDOR: 0
BLOOD IN STOOL: 0
NAUSEA: 0
SHORTNESS OF BREATH: 1
WHEEZING: 0
GASTROINTESTINAL NEGATIVE: 1
CHEST TIGHTNESS: 0
NAUSEA: 0
WHEEZING: 0
COUGH: 0
GASTROINTESTINAL NEGATIVE: 1
BLOOD IN STOOL: 0
CHEST TIGHTNESS: 0
CONSTIPATION: 0
BACK PAIN: 0
WHEEZING: 0
COUGH: 0
PHOTOPHOBIA: 0
EYES NEGATIVE: 1
DIARRHEA: 0
STRIDOR: 0
VOMITING: 0
COUGH: 0
WHEEZING: 1
EYES NEGATIVE: 1
EYES NEGATIVE: 1
COUGH: 0
WHEEZING: 0
CHEST TIGHTNESS: 0
SHORTNESS OF BREATH: 1
COUGH: 0
ABDOMINAL PAIN: 0
NAUSEA: 0
STRIDOR: 0
SORE THROAT: 1
WHEEZING: 0
GASTROINTESTINAL NEGATIVE: 1
COUGH: 1
DIARRHEA: 0
EYES NEGATIVE: 1
COUGH: 0
NAUSEA: 0
WHEEZING: 1
SHORTNESS OF BREATH: 1
EYES NEGATIVE: 1
COUGH: 0
STRIDOR: 0
CHEST TIGHTNESS: 0
BLOOD IN STOOL: 0
GASTROINTESTINAL NEGATIVE: 1
COUGH: 1
SHORTNESS OF BREATH: 1
STRIDOR: 0
SHORTNESS OF BREATH: 0
BLOOD IN STOOL: 0
GASTROINTESTINAL NEGATIVE: 1
CHEST TIGHTNESS: 0
CHEST TIGHTNESS: 0
EYES NEGATIVE: 1
SHORTNESS OF BREATH: 1
STRIDOR: 0

## 2019-01-01 ASSESSMENT — PAIN DESCRIPTION - LOCATION: LOCATION: THROAT

## 2019-01-01 ASSESSMENT — PAIN DESCRIPTION - DESCRIPTORS: DESCRIPTORS: SORE

## 2019-04-27 NOTE — ED PROVIDER NOTES
TABLET    Take 1 tablet by mouth daily    METOPROLOL TARTRATE (LOPRESSOR) 25 MG TABLET    Take 25 mg by mouth 2 times daily    SIMVASTATIN (ZOCOR) 5 MG TABLET    Take 5 mg by mouth nightly       ALLERGIES     Patient has no known allergies. FAMILY HISTORY     History reviewed. No pertinent family history. SOCIAL HISTORY       Social History     Socioeconomic History    Marital status:      Spouse name: None    Number of children: None    Years of education: None    Highest education level: None   Occupational History    None   Social Needs    Financial resource strain: None    Food insecurity:     Worry: None     Inability: None    Transportation needs:     Medical: None     Non-medical: None   Tobacco Use    Smoking status: Never Smoker    Smokeless tobacco: Never Used   Substance and Sexual Activity    Alcohol use: Not Currently    Drug use: Never    Sexual activity: None   Lifestyle    Physical activity:     Days per week: None     Minutes per session: None    Stress: None   Relationships    Social connections:     Talks on phone: None     Gets together: None     Attends [employer] service: None     Active member of club or organization: None     Attends meetings of clubs or organizations: None     Relationship status: None    Intimate partner violence:     Fear of current or ex partner: None     Emotionally abused: None     Physically abused: None     Forced sexual activity: None   Other Topics Concern    None   Social History Narrative    None       SCREENINGS      @FLOW([id number])@      PHYSICAL EXAM    (up to 7 for level 4, 8 or more for level 5)     ED Triage Vitals   BP Temp Temp Source Pulse Resp SpO2 Height Weight   04/27/19 0919 04/27/19 0919 04/27/19 0919 04/27/19 0919 04/27/19 0918 04/27/19 0919 04/27/19 0918 04/27/19 0918   (!) 149/70 98.5 °F (36.9 °C) Oral 110 20 92 % 4' 11\" (1.499 m) 95 lb (43.1 kg)       Physical Exam  This is a 75-year-old female.   She does not appear in distress although pulse oximetry 99% on room air. No conjunctival discharge. No nasal discharge seen. Oropharynx appears normal.  No exudate. TMs clear without infection. Neck supple without lymphadenopathy. Clear. Heart regular rhythm, diastolic murmur present. No abdominal chest palpation. No acute skin rash. No acute joint inflammation. It appears normal.  Patient is awake alert and appropriate. Patient moves all extremities symmetrically without focal weakness or sensory deficit. Speech pattern and cranial nerves appear to be intact. No focal neurologic deficit. DIAGNOSTIC RESULTS     EKG: All EKG's are interpreted by the Emergency Department Physician who either signs or Co-signsthis chart in the absence of a cardiologist.        RADIOLOGY:   Non-plain filmimages such as CT, Ultrasound and MRI are read by the radiologist. Plain radiographic images are visualized and preliminarily interpreted by the emergency physician with the below findings:    Chest x-ray shows right-sided infiltrate. There is also left pleural effusion. This is as interpreted by myself. Interpretation per the Radiologist below, if available at the time ofthis note:    XR CHEST STANDARD (2 VW)    (Results Pending)         ED BEDSIDE ULTRASOUND:   Performed by ED Physician - none    LABS:  Labs Reviewed   RAPID INFLUENZA A/B ANTIGENS   RAPID STREP SCREEN   CULTURE BETA STREP CONFIRM PLATE       All other labs were within normal range or not returned as of this dictation. EMERGENCY DEPARTMENT COURSE and DIFFERENTIAL DIAGNOSIS/MDM:   Vitals:    Vitals:    04/27/19 0918 04/27/19 0919   BP:  (!) 149/70   Pulse:  110   Resp: 20    Temp:  98.5 °F (36.9 °C)   TempSrc:  Oral   SpO2:  92%   Weight: 95 lb (43.1 kg)    Height: 4' 11\" (1.499 m)        Patient has pneumonia and will betreated with Levaquin.   Although she has borderline low pulse oximetry she has normal respiratory rate and is not in respiratory distress. Patient was informed of her pleural effusion as well. She was told to see her physician for further evaluation of that finding. She is to call for appointment. Return criteria including shortness of breath discussed with patient and she expressed understanding. MDM    CRITICAL CARE TIME   Total Critical Care time was  minutes, excluding separately reportableprocedures. There was a high probability of clinicallysignificant/life threatening deterioration in the patient's condition which required my urgent intervention. CONSULTS:  None    PROCEDURES:  Unless otherwise noted below, none     Procedures    FINAL IMPRESSION      1. Pneumonia of right middle lobe due to infectious organism ([address]    2. Pleural effusion, left        DISPOSITION/PLAN   DISPOSITION Decision To Discharge 04/27/2019 09:52:59 AM      PATIENT REFERRED TO:  Inocencio Castellanos MD  [address] [place] [address]  [phone]    Schedule an appointment as soon as possible for a visit   Follow-up with your doctor for further evaluation of fluid around the left lung and to assure that pneumonia improves.       DISCHARGE MEDICATIONS:  New Prescriptions    LEVOFLOXACIN (LEVAQUIN) 750 MG TABLET    Take 1 tablet by mouth daily for 7 days          (Please note that portions of this note were completed with a voice recognitionprogram.  Efforts were made to edit the dictations but occasionally words are mis-transcribed.)    Reg Jean MD (electronically signed)  Attending Emergency Physician        Greta Knutson MD  04/27/19 6381

## 2019-05-07 PROBLEM — E86.1 HYPOVOLEMIA: Status: ACTIVE | Noted: 2019-01-01

## 2019-05-07 PROBLEM — J18.9 PNEUMONIA: Status: ACTIVE | Noted: 2019-01-01

## 2019-05-07 NOTE — ED PROVIDER NOTES
[address] [place] [address] ED  eMERGENCY dEPARTMENT eNCOUnter      Pt Name: Dandre Dickey  MRN: [id number]  [place] 1940  Date of evaluation: 5/7/2019  Provider: Vincenzo Bojorquez MD    CHIEF COMPLAINT       Chief Complaint   Patient presents with    Extremity Weakness     general weakness, edema in both legs. recent diagnosis of pneumonia.  Emesis         HISTORY OF PRESENT ILLNESS   (Location/Symptom, Timing/Onset,Context/Setting, Quality, Duration, Modifying Factors, Severity)  Note limiting factors. Dandre Dickey is a 66 y.o. female who presents to the emergency department with weakness, 2-3 day duration. She had pneumonia 2-3 weeks ago, she states that she recovered but since that time she's had a bloated belly and a poor appetite. Diarrhea, constipation or not present but she's been nausea and vomited her medications yesterday. She was seen yesterday in the urgent care clinic. There is a cough, recurring, 5-6 day duration. She denies dysuria or frequency. She is weak and no longer can ambulate very well. Her family states that she is not eating very well at all. Confusion has not been present. Fevers not documented. Body aches and not documented. HPI    NursingNotes were reviewed. REVIEW OF SYSTEMS    (2-9 systems for level 4, 10 or more for level 5)     Review of Systems       Constitutional symptoms:  Positive weakness, positive fatigue, no fever, no chills. Skin symptoms:  Negative except as documented in HPI. ENMT symptoms:  Negative except as documented in HPI. Respiratory symptoms:  Negative except as documented in HPI. Positive cough  Cardiovascular symptoms:  Negative except as documented in HPI. Gastrointestinal symptoms: Negative except for documented as above in the HPI bloated abdomen, positive nausea   Genitourinary symptoms:  Negative except as documented in HPI. Musculoskeletal symptoms:  Negative except as documented in HPI.    Neurologic symptoms:  Negative except and dry. -Apparent lesions or rashes: No    NEURO: -Patient: alert                -Oriented to: person, place and time. -Appearance and judgment: appropriate.                -Cranial Nerves: Normal.                -Speech: Normal    Motor weakness which is generalized        DIAGNOSTIC RESULTS     EKG: All EKG's are interpreted by the Emergency Department Physician who either signs or Co-signsthis chart in the absence of a cardiologist.    EKG was revewed  and revealed normal sinus rhythm, rate is 70-85. no acute ST elevations,no flipped T waves , no Q waves. WV interval is normal.QRS duration is normal. Axes are normal. There are no PVCs    RADIOLOGY:   Non-plain filmimages such as CT, Ultrasound and MRI are read by the radiologist. Plain radiographic images are visualized and preliminarily interpreted by the emergency physician with the below findings:    Patient accepted by hospitalist here but then deteriorated with regard to blood pressure and went into supraventricular tachycardia. Patient declined by hospitalist, orders changed to transfer. I talked to hospitalist at [facility] who accepted. Adenosine, 6 mg IV cardioverted the patient. Dopamine will not be started. Levophed when necessary SBP less than 70. We will give additional fluids. I fear that she may become or is becoming septic. Condition is changed to critical..  Code status gets discussed with patient was alert of the time and advised intubation and cardioversion, CPR as necessary, focal    Interpretation per the Radiologist below, if available at the time ofthis note:    XR CHEST PORTABLE    (Results Pending)   CT ABDOMEN PELVIS WO CONTRAST Additional Contrast? None    (Results Pending)     Potassium is mildly low. There are bilateral pleural effusions with pneumonia. CT of the abdomen pelvis fails to demonstrate acute pathology. She is mildly hypokalemic. Strength is improved with hydration.   I'll contact hospitalist for admission as they wish to try to stay here possible    ED BEDSIDE ULTRASOUND:   Performed by ED Physician - none    LABS:  Labs Reviewed   CBC - Abnormal; Notable for the following components:       Result Value    WBC 21.7 (*)     MCV 81.3 (*)     MCH 26.8 (*)     All other components within normal limits   COMPREHENSIVE METABOLIC PANEL - Abnormal; Notable for the following components:    Sodium 125 (*)     Potassium 3.0 (*)     Chloride 80 (*)     Glucose 138 (*)     BUN 51 (*)     CREATININE 1.82 (*)     GFR Non- 26.8 (*)     GFR  32.4 (*)     Alb 3.2 (*)     All other components within normal limits    Narrative:     CALL  [facility] tel. [phone],  Chemistry results called to and read back by reba murray, 05/07/2019 19:56, by  [facility]   TROPONIN - Abnormal; Notable for the following components:    Troponin 0.028 (*)     All other components within normal limits    Narrative:     Tonya Cleaning tel. [phone],  Chemistry results called to and read back by dr Yunior Noble, 05/07/2019 20:27, by  Jaycee Ward - Abnormal; Notable for the following components:    Lipase 135 (*)     All other components within normal limits   RAPID INFLUENZA A/B ANTIGENS   CULTURE BLOOD #1   CULTURE BLOOD #2   BRAIN NATRIURETIC PEPTIDE    Narrative:     CALL  [facility] tel. [phone],  Chemistry results called to and read back by reba murray, 05/07/2019 19:56, by  [facility]   URINE RT REFLEX TO CULTURE   PROTIME-INR   LACTIC ACID, PLASMA       All other labs were within normal range or not returned as of this dictation.     EMERGENCY DEPARTMENT COURSE and DIFFERENTIAL DIAGNOSIS/MDM:   Vitals:    Vitals:    05/07/19 2015 05/07/19 2217 05/07/19 2228 05/07/19 2232   BP:  (!) 81/51 (!) 81/51 (!) 96/56   Pulse: 110 109 159 102   Resp:  24 24 23   Temp:  99.4 °F (37.4 °C)     TempSrc:  Oral     SpO2: 96% 94% 94% 92%   Weight:       Height:               MDM    CRITICAL CARE TIME   Total Critical Care time was 45  minutes, excluding separately reportableprocedures. There was a high probability of clinicallysignificant/life threatening deterioration in the patient's condition which required my urgent intervention. CONSULTS:  None    PROCEDURES:  Unless otherwise noted below, none     Procedures    FINAL IMPRESSION      1. Pneumonia due to organism    2. Generalized weakness    3. Nausea and vomiting, intractability of vomiting not specified, unspecified vomiting type    4. Elevated troponin    5. Paroxysmal supraventricular tachycardia ([facility] [address]    6.  Hypotension, unspecified hypotension type          DISPOSITION/PLAN   DISPOSITION Decision To Transfer 05/07/2019 10:33:40 PM      PATIENT REFERRED TO:  Oleg Muse MD  [address] [facility] [id number] [phone]            DISCHARGE MEDICATIONS:  New Prescriptions    No medications on file          (Please note that portions of this note were completed with a voice recognition program.  Efforts were made to edit the dictations but occasionally words are mis-transcribed.)    Kaylen Mallory MD (electronically signed)  Attending Emergency Physician          Kaylen Mallory MD  05/07/19 2122       Kaylen Mallory MD  05/07/19 2124       Kaylen Mallory MD  05/07/19 2234       Kaylen Mallory MD  05/07/19 2235

## 2019-05-08 PROBLEM — R11.2 NON-INTRACTABLE VOMITING WITH NAUSEA: Status: ACTIVE | Noted: 2019-01-01

## 2019-05-08 PROBLEM — E87.8 ELECTROLYTE ABNORMALITY: Status: ACTIVE | Noted: 2019-01-01

## 2019-05-08 PROBLEM — R53.1 WEAKNESS: Status: ACTIVE | Noted: 2019-01-01

## 2019-05-08 PROBLEM — R77.8 ELEVATED TROPONIN: Status: ACTIVE | Noted: 2019-01-01

## 2019-05-08 PROBLEM — R06.00 DYSPNEA: Status: ACTIVE | Noted: 2019-01-01

## 2019-05-08 PROBLEM — I95.9 HYPOTENSION: Status: ACTIVE | Noted: 2019-01-01

## 2019-05-08 NOTE — CARE COORDINATION
Pt was having testing done this am so LSW met with dtr. Dtr Primitivo Balbuena stated that Pt does well at home with spouse and drives. LSW to follow. .Electronically signed by IBAN Gamez on 5/8/2019 at 1:02 PM

## 2019-05-08 NOTE — PROGRESS NOTES
[phone] [id number] patient arrived from [facility] via ambulance. Alert and oriented x 3. BP in 90s. Patient hooked to our monitoring devices. mepilex applied. NSR on monitor. Registration called to get patient added to our system. DR. Bladimir Nicolas notified of patients arrival and    BP decreased to 75/38. Dr. Carina Roe served again. ST elevation in V2 AND V3 leads on monitor. Patient denies shoulder, back, neck, jaw, and chest pain. Orders received. [address] at bedside examining patient. EKG done. Pharmacy called to get levophed ASAP.  and daughter here at bedside updated and hipaa code given to .  also provided home med list.  2215  notified of need for ID band. Lab drawing blood   0300 levo stopped. MAP >65  0326 coffee ground emesis. Dr. Jennifer villegas served. Specimen sent for gastic occult   3368 daughter came to desk stating \" her mother has had more coughing\". Pathophysiology of fluid volume over load  And pna explained. Patient is having labored breathing. No void since admission. 0710 patient on side of bed with daughter supporing her. Orthopnea and 3 word dsypnea. States \" cannot catch her breath\". Dr. López President perfect served for long and lasix now that BP is stable. 3759 Dr. López President at bedside examining patient. He discussed paracentesis with family. 2497Foley inserted, lasix given 300ml of yellow clear urine immediately returned. HOB left at 90 degrees. Family at bedside.

## 2019-05-08 NOTE — ED NOTES
Dr. Kehinde Wilkins accepted this patient to [facility]. Called the [facility], spoke with [facility], to request a room assignment and to set up STAT transport; they will call back with same.      Renetta Sun  05/07/19 6510

## 2019-05-08 NOTE — PLAN OF CARE
Nutrition Problem: Inadequate oral intake  Intervention: Food and/or Nutrient Delivery: Continue NPO(Monitor for ability to advance to regular diet with tolerance)  Nutritional Goals: ability to initiate po diet with tolerance vs need for TPN

## 2019-05-08 NOTE — PROGRESS NOTES
40 mg  40 mg Intravenous Daily Reuben Filipe, APRN - CNP   40 mg at 05/08/19 0844    And    sodium chloride (PF) 0.9 % injection 10 mL  10 mL Intravenous Daily Reuben Filipe, APRN - CNP   10 mL at 05/08/19 0845    acetaminophen (TYLENOL) tablet 650 mg  650 mg Oral Q4H PRN Reuben Cameron, APRN - CNP        piperacillin-tazobactam (ZOSYN) 3.375 g in dextrose 5 % 50 mL IVPB extended infusion (mini-bag)  3.375 g Intravenous Q8H Reuben Filipe, APRN - CNP 12.5 mL/hr at 05/08/19 0843 3.375 g at 05/08/19 0843    vancomycin (VANCOCIN) intermittent dosing (placeholder)   Other RX Placeholder Reuben Cameron, APRN - CNP        aspirin chewable tablet 81 mg  81 mg Oral Daily Beverly Pentito, APRN - CNP        calcium elemental (OSCAL) tablet 500 mg  500 mg Oral Daily Beverly Pentito, APRN - CNP        vitamin D (CHOLECALCIFEROL) tablet 1,000 Units  1,000 Units Oral Daily Reuben Cameron, APRN - CNP        potassium chloride (KLOR-CON M) extended release tablet 20 mEq  20 mEq Oral BID Reuben Cameron, APRN - CNP        simvastatin (ZOCOR) tablet 5 mg  5 mg Oral Nightly Beverly Pentito, APRN - CNP        lidocaine 2 % injection             norepinephrine (LEVOPHED) 16 mg in dextrose 5 % 250 mL infusion  2 mcg/min Intravenous Continuous Dwight Green MD   Stopped at 05/08/19 0250     Assessment and Plan:          Acute Hospital issues:    # shock POA- septic vs hypovolemic- due to PNA, poor oral intake, rule out SBP, right sided heart failure  - has massive ascites and bilateral pleural effusion. S/p paracentesis, f/u fluids   - resume vancomycin and zosyn   - resume Levophed to keep MAP>65, avoid hypotension   - getting albumin after paracentesis     # generalized weakness   - due to above     # suspected PNA POA   - resume abx     # ascites- new onset.  Due to right sided heart failure vs ?thrombosis   - rule out portal vein thrombosis, will obtain liver US with doppler     # LE edema   - DVT scan     # concern for GI

## 2019-05-08 NOTE — ED NOTES
Dr. Vidhi Valdez accepted this patient. Spoke with supervisor Patricia Boles, to get a room assignment, patient is going to room: 214. Admitting notified and the supervisor informed that patient is ready to be moved.      Ruddy Condon  05/07/19 2140

## 2019-05-08 NOTE — H&P
[place] [address] MEDICINE    HISTORY AND PHYSICAL EXAM    PATIENT NAME:  Roderick Brandt    MRN:  [id number]  SERVICE DATE:  5/8/2019   SERVICE TIME:  12:58 AM    Primary Care Physician: Elio Santacruz MD         SUBJECTIVE  CHIEF COMPLAINT:  Weakness    HPI:  This is a 66 y.o. female who presents with significant PMH bilateral breast cancer with bilateral mastectomy and radiation, cancer in each breast occurred years apart, HLD, HTN; patient presented to the ER earlier tonight complaining of generalized weakness and swelling in her lower extremities. Patient was diagnosed with pneumonia on 4/27 and treated as outpatient with oral Levaquin. Chest x-ray at that time showed bilateral pleural effusions with bibasilar atelectasis/pneumonia. Patient states she finished her course of oral antibiotics. Patient also is complaining of abdominal distention, nausea, vomiting, difficulty keeping solids or liquids down. Patient denies fevers, chest pain, shortness of breath. Patient did state she had an episode of shortness of breath and breathing fast while in the ER but EKG at that time determine the patient was in SVT. Patient denies any prior history of cardiac arrhythmia, CAD, CHF, MI, CAD. Patient denies modifying or alleviating factors for her weakness. Patient notes that she did not have the abdominal distention or poor appetite and total after finishing the oral Levaquin. Patient states she is having normal bowel movements, denies melena or hematemesis. Patient also denies abdominal pain, constipation, diarrhea, dysuria, numbness or tingling, vision changes, falls.     PAST MEDICAL HISTORY:    Past Medical History:   Diagnosis Date    Cancer [facility])     breast, radiation and mastectomy    Hyperlipidemia     Hypertension      PAST SURGICAL HISTORY:    Past Surgical History:   Procedure Laterality Date    MASTECTOMY      bilateral, first one in 80, 2nd one few years ago     FAMILY HISTORY:    Family History   Problem Relation Age of Onset    No Known Problems Mother     Heart Attack Father     Heart Disease Neg Hx     Stroke Neg Hx     Cancer Neg Hx     COPD Neg Hx     Diabetes Neg Hx      SOCIAL HISTORY:    Social History     Socioeconomic History    Marital status:      Spouse name: Not on file    Number of children: Not on file    Years of education: Not on file    Highest education level: Not on file   Occupational History    Not on file   Social Needs    Financial resource strain: Not on file    Food insecurity:     Worry: Not on file     Inability: Not on file    Transportation needs:     Medical: Not on file     Non-medical: Not on file   Tobacco Use    Smoking status: Never Smoker    Smokeless tobacco: Never Used   Substance and Sexual Activity    Alcohol use: Not Currently    Drug use: Never    Sexual activity: Not on file   Lifestyle    Physical activity:     Days per week: Not on file     Minutes per session: Not on file    Stress: Not on file   Relationships    Social connections:     Talks on phone: Not on file     Gets together: Not on file     Attends Caodaism service: Not on file     Active member of club or organization: Not on file     Attends meetings of clubs or organizations: Not on file     Relationship status: Not on file    Intimate partner violence:     Fear of current or ex partner: Not on file     Emotionally abused: Not on file     Physically abused: Not on file     Forced sexual activity: Not on file   Other Topics Concern    Not on file   Social History Narrative    Not on file     MEDICATIONS:   Prior to Admission medications    Medication Sig Start Date End Date Taking?  Authorizing Provider   potassium chloride (KLOR-CON M) 20 MEQ extended release tablet Take 20 mEq by mouth 2 times daily   Yes Historical Provider, MD   furosemide (LASIX) 20 MG tablet Take 10 mg by mouth daily   Yes Historical Provider, MD   lisinopril-hydrochlorothiazide (PRINZIDE;ZESTORETIC) 20-25 MG per tablet Take 1 tablet by mouth daily   Yes Historical Provider, MD   Calcium Carbonate (CALCIUM 600 PO) Take 600 mg by mouth daily   Yes Historical Provider, MD   aspirin 81 MG chewable tablet Take 81 mg by mouth daily   Yes Historical Provider, MD   Cholecalciferol (VITAMIN D3) 1000 units CAPS Take 1 tablet by mouth daily   Yes Historical Provider, MD   simvastatin (ZOCOR) 5 MG tablet Take 5 mg by mouth nightly   Yes Historical Provider, MD   IBANDRONATE SODIUM PO Take by mouth every 30 days   Yes Historical Provider, MD       ALLERGIES: Patient has no known allergies. REVIEW OF SYSTEM:   ROS as noted in HPI, 12 point ROS reviewed and otherwise negative. OBJECTIVE  PHYSICAL EXAM: There were no vitals taken for this visit. CONSTITUTIONAL:  fatigued, alert, cooperative and thin  ENT:  dry mucosa  LUNGS:  Hypoxic on 2L NC, bumped up to 4L NC respiratory distress, decreased air exchange and crackles noted anterior, significantly diminished posterior  CARDIOVASCULAR:  normal apical pulses, regular rate and rhythm, normal S1 and S2 and nonpitting edema BLE  ABDOMEN:  normal bowel sounds, firm, distended and non-tender  CHEST/BREASTS:  Bilateral mastectomy  MUSCULOSKELETAL:  Notable weak in bed, needed assistance just to sit up  NEUROLOGIC:  Awake, alert, oriented to name, place and time. Cranial nerves II-XII are grossly intact. SKIN:  no rashes, no lesions and no jaundice    DATA:     Diagnostic tests reviewed for today's visit:    Most recent labs and imaging results reviewed.      LABS:    Recent Results (from the past 24 hour(s))   CBC    Collection Time: 05/07/19  7:35 PM   Result Value Ref Range    WBC 21.7 (H) 4.8 - 10.8 K/uL    RBC 4.81 4.20 - 5.40 M/uL    Hemoglobin 12.9 12.0 - 16.0 g/dL    Hematocrit 39.1 37.0 - 47.0 %    MCV 81.3 (L) 82.0 - 100.0 fL    MCH 26.8 (L) 27.0 - 31.3 pg    MCHC 33.0 33.0 - 37.0 %    RDW 14.0 11.5 - 14.5 %    Platelets 104 420 - 002 K/uL Comprehensive Metabolic Panel    Collection Time: 05/07/19  7:35 PM   Result Value Ref Range    Sodium 125 (L) 135 - 144 mEq/L    Potassium 3.0 (LL) 3.4 - 4.9 mEq/L    Chloride 80 (L) 95 - 107 mEq/L    CO2 31 20 - 31 mEq/L    Anion Gap 14 9 - 15 mEq/L    Glucose 138 (H) 70 - 99 mg/dL    BUN 51 (H) 8 - 23 mg/dL    CREATININE 1.82 (H) 0.50 - 0.90 mg/dL    GFR Non-African American 26.8 (L) >60    GFR  32.4 (L) >60    Calcium 9.0 8.5 - 9.9 mg/dL    Total Protein 6.6 6.3 - 8.0 g/dL    Alb 3.2 (L) 3.5 - 4.6 g/dL    Total Bilirubin 0.6 0.2 - 0.7 mg/dL    Alkaline Phosphatase 55 40 - 130 U/L    ALT 18 0 - 33 U/L    AST 34 0 - 35 U/L    Globulin 3.4 2.3 - 3.5 g/dL   Brain Natriuretic Peptide    Collection Time: 05/07/19  7:35 PM   Result Value Ref Range    Pro-BNP 1,659 pg/mL   Troponin    Collection Time: 05/07/19  8:00 PM   Result Value Ref Range    Troponin 0.028 (HH) 0.000 - 0.010 ng/mL   Protime-INR    Collection Time: 05/07/19  8:00 PM   Result Value Ref Range    Protime 10.9 9.0 - 11.5 sec    INR 1.1    Lipase    Collection Time: 05/07/19  8:00 PM   Result Value Ref Range    Lipase 135 (H) 12 - 95 U/L   EKG 12 Lead - Chest Pain    Collection Time: 05/07/19  8:08 PM   Result Value Ref Range    Ventricular Rate 109 BPM    Atrial Rate 109 BPM    P-R Interval 118 ms    QRS Duration 80 ms    Q-T Interval 342 ms    QTc Calculation (Bazett) 460 ms    P Axis 39 degrees    R Axis 59 degrees    T Axis 80 degrees   Lactic Acid, Plasma    Collection Time: 05/07/19  8:11 PM   Result Value Ref Range    Lactic Acid 1.6 0.5 - 2.2 mmol/L   Rapid Influenza A/B Antigens    Collection Time: 05/07/19  8:36 PM   Result Value Ref Range    Rapid Influenza A Ag Negative Negative    Rapid Influenza B Ag Negative Negative   Urine Reflex to Culture    Collection Time: 05/07/19  9:40 PM   Result Value Ref Range    Color, UA Yellow Straw/Yellow    Clarity, UA Clear Clear    Glucose, Ur Negative Negative mg/dL    Bilirubin Urine Negative Negative    Ketones, Urine Trace Negative mg/dL    Specific Gravity, UA 1.015 1.005 - 1.030    Blood, Urine Negative Negative    pH, UA 5.5 5.0 - 9.0    Protein, UA Trace Negative mg/dL    Urobilinogen, Urine 0.2 <2.0 E.U./dL    Nitrite, Urine Negative Negative    Leukocyte Esterase, Urine Negative Negative    Urine Reflex to Culture Not Indicated    EKG 12 Lead    Collection Time: 05/07/19 10:27 PM   Result Value Ref Range    Ventricular Rate 158 BPM    Atrial Rate 156 BPM    QRS Duration 84 ms    Q-T Interval 322 ms    QTc Calculation (Bazett) 522 ms    R Axis 40 degrees    T Axis 35 degrees   EKG 12 Lead    Collection Time: 05/07/19 10:31 PM   Result Value Ref Range    Ventricular Rate 99 BPM    Atrial Rate 99 BPM    P-R Interval 118 ms    QRS Duration 78 ms    Q-T Interval 328 ms    QTc Calculation (Bazett) 420 ms    P Axis 58 degrees    R Axis 58 degrees    T Axis 81 degrees       IMAGING:  No results found.     VTE Prophylaxis: unfractionated heparin -  start    [address]  Patient Active Hospital Problem List:   Pneumonia (5/7/2019)    Assessment: Diagnosed with pneumonia on 4/27 and treated as outpatient with oral Levaquin, patient denies shortness of breath or chest pain however hypoxic on 2 L of nasal cannula and had to be increased to 4 L nasal cannula, chest x-ray from ER significantly worse compared to chest x-ray from 4/27, pleural effusions noted, concern for parapneumonic effusions, WBC shows significant increase now 21.7 prior was normal     Plan:   Admit to ICU with telemetry  Consult pulmonology  Consult interventional radiology for thoracentesis  Start IV Zosyn and IV Vanco  Blood cultures pending      Dyspnea (5/8/2019)    Assessment: BNP elevated 1659, no prior history of CHF, pleural effusions noted on x-ray, hypoxia noted, crackles noted anteriorly with lower extremity swelling, patient is not complaining of shortness of breath, no tachypnea noted     Plan:  I believe this is all related to fluid  Plan for thoracentesis in the morning  Echo in the a.m. Hold off on Lasix until blood pressure improves    Hypotension (5/8/2019)    Assessment: Patient developed hypotension in ER prior to going into SVT, patient has had persistent hypotension since     Plan:   Levo fed, titrate as needed      Elevated troponin (5/8/2019)    Assessment: Troponins 0.0208, no chest pain, no prior troponin to compare, possible CHF related     Plan:   Cycle troponin      Electrolyte abnormality (5/8/2019)    Assessment: Patient has had nausea and vomiting, poor solid in liquid intake, sodium 125 patient does have ascites with lower extremity swelling and pleural effusions noted, that could be CHF component, hypokalemia 3.0 likely to emesis and poor intake     Plan:   Normal saline IV at 50 since patient is npo, do not want to increase as patient already appears to have too much fluid   IV potassium, monitor on telemetry  Repeat labs in a.m.      DOT  Assessment: Creatinine slightly improved from prior 1.8 to, however BUN increased and is now 51, likely dehydration from fluid shift and poor intake with vomiting  Plan:  Normal saline IV at 50  Avoid nephrotoxic drugs  Monitor renal function  Monitor intake and output     Non-intractable vomiting with nausea (5/8/2019)    Assessment: Poor solid and liquid intake, abdomen is distended however CT only shows ascites with no other acute process, abdomen is nontender to palpate, there is no jaundice, liver enzymes within normal limits although lipase slightly elevated at 135     Plan:   Npo  Advance diet as tolerated   Zofran   Consider paracentesis if distention worsens     Weakness (5/8/2019)    Assessment: Likely secondary to illness     Plan:   PT/OT next and fall precautions   for discharge planning           Plan of care discussed with: patient and family    SIGNATURE: MITCH Talamantes CNP  DATE: May 8, 2019  TIME: 12:58 AM     Electronically signed by MITCH Jensen CNP on 5/8/2019 at 1:25 AM     Dr. Patricia Santos MD - supervising physician    Attending's Note:  Pt was seen and evaluated and discussed care with NP. I agree with the above assessment and plan. Patient presented to the ED with generalized weakness and SOB. Patient has been feeling more weak since Sunday. She was found to have worsening of her recently diagnosed pneumonia. Patient said that she finished the oral antibiotic course she was started on in the ED. She did not feel any improvement. She has no fever. CT abd was done due to patient abd distention and it showed bilateral pneumonia with pleural effusion. ? parapneumonic   Effusion. Increase leukocytosis. She has history of breast cancer. ? Mets to the lungs. CT was done without contrast due to renal failure. Will consult pulmonology. Will start IV empiric antibiotics. Patient had an episode of SVT in the ER at [facility]. She has hypokalemia and hypomagnesemia. Will replace electrolytes and consult cardiology. Will order Echo. Increase creatinine noted likely due to DOT. Will monitor closely. Patient is hypotensive. Will start levophed.        Ariadna Ellis, [address] [facility]

## 2019-05-08 NOTE — CONSULTS
ADDITION CONSULT    Chief Complaint: SOB PNA      History of Present Illness:  Recent hosp for pNA. Again admitted with sob and severe weakness and hypoxia. Initial ECG shows Sinus Tach. I have not seen PSVT as noted in ER. She has signficant Pleural effusion    She has prior Breast CA. No prior CAD PAD CVA        Review of Systems:   Review of Systems   Constitutional: Positive for fatigue. Negative for diaphoresis. HENT: Negative. Eyes: Negative. Respiratory: Positive for shortness of breath. Negative for cough, chest tightness, wheezing and stridor. Cardiovascular: Negative. Negative for chest pain, palpitations and leg swelling. Gastrointestinal: Negative. Negative for blood in stool and nausea. Genitourinary: Negative. Musculoskeletal: Negative. Skin: Negative. Neurological: Positive for weakness. Negative for dizziness, syncope and light-headedness. Hematological: Negative. Psychiatric/Behavioral: Negative. Physical Examination:    BP (!) 80/46   Pulse 90   Resp 25   Ht 4' 10\" (1.473 m)   Wt 89 lb 15.2 oz (40.8 kg)   SpO2 94%   BMI 18.80 kg/m²    Physical Exam   Constitutional: She appears cachectic. No distress. She appears chronically ill and acutely ill. HENT:   Normal cephalic and Atraumatic   Eyes: Pupils are equal, round, and reactive to light. Neck: Normal range of motion and thyroid normal. Neck supple. No JVD present. No neck adenopathy. No thyromegaly present. Cardiovascular: Normal rate, regular rhythm, intact distal pulses and normal pulses. Murmur heard. Pulmonary/Chest: Effort normal. She has no rales. She has diffuse wheezes. She exhibits no tenderness. Abdominal: Soft. Bowel sounds are normal. There is no tenderness. Musculoskeletal: Normal range of motion. She exhibits no edema or tenderness. Neurological: She is alert and oriented to person, place, and time. Skin: Skin is warm. No cyanosis. Nails show no clubbing. Active Hospital Problems    Diagnosis Date Noted    Hypotension [I95.9] 05/08/2019     Priority: Low    Elevated troponin [R74.8] 05/08/2019     Priority: Low    Dyspnea [R06.00] 05/08/2019     Priority: Low    Electrolyte abnormality [E87.8] 05/08/2019     Priority: Low    Non-intractable vomiting with nausea [R11.2] 05/08/2019     Priority: Low    Weakness [R53.1] 05/08/2019     Priority: Low    Pneumonia [J18.9] 05/07/2019     Priority: Low      Impression/Plan:  1. Resp distress/PNA- on RX and ABX  2. Sepsis on Levophed  3. Hypotension  4. Sinus Tach- clinically appropriate. 5. DOT- improving  6.  Will review Tamia Moseley MD

## 2019-05-08 NOTE — ED NOTES
Juanis from the [facility] called with a bed assignment: ICU 10. [facility] called for STAT transport, our squad now.      Lili Cuevas  05/07/19 [id number]

## 2019-05-08 NOTE — CONSULTS
Pulmonary and Critical Care Medicine  Consult Note  Encounter Date: 2019 12:34 PM    Ms. Koffi Gutierrez is a 66 y.o. female  : 1940  Requesting Provider: Sherine Levin DO    Reason for request: resp failure, shock ,             HISTORY OF PRESENT ILLNESS:    Patient is 66 y.o. presents with 3 days history of weakness, has not been feeling well for at least 2-3 weeks, she was recently hospitalized for pneumonia, she reports poor appetite, she started having shortness of breath yesterday progressively worse and came to emergency room. No reported fever, abdomen is distended and has been getting progressively worse over the last month or so, no chest pain, no cough, no nausea or vomiting. She was started on Levophed earlier this was weaned off, and later restarted during paracentesis. She has history of breast cancer. Past Medical History:        Diagnosis Date    Cancer [facility])     breast, radiation and mastectomy    Hyperlipidemia     Hypertension        Past Surgical History:        Procedure Laterality Date    MASTECTOMY      bilateral, first one in , 2nd one few years ago       Social History:     reports that she has never smoked. She has never used smokeless tobacco. She reports that she drank alcohol. She reports that she does not use drugs. Family History:       Problem Relation Age of Onset    No Known Problems Mother     Heart Attack Father     Heart Disease Neg Hx     Stroke Neg Hx     Cancer Neg Hx     COPD Neg Hx     Diabetes Neg Hx        Allergies:  Patient has no known allergies.         MEDICATIONS during current hospitalization:    Continuous Infusions:   norepinephrine Stopped (19 0250)       Scheduled Meds:   sodium chloride flush  10 mL Intravenous 2 times per day    pantoprazole  40 mg Intravenous Daily    And    sodium chloride (PF)  10 mL Intravenous Daily    piperacillin-tazobactam  3.375 g Intravenous Q8H    vancomycin (VANCOCIN) intermittent dosing (placeholder)   Other RX Placeholder    aspirin  81 mg Oral Daily    calcium elemental  500 mg Oral Daily    vitamin D  1,000 Units Oral Daily    potassium chloride  20 mEq Oral BID    simvastatin  5 mg Oral Nightly    lidocaine           PRN Meds:sodium chloride flush, magnesium hydroxide, ondansetron, acetaminophen        REVIEW OF SYSTEMS:  ROS: 10 organs review of system is done including general, psychological, ENT, hematological, endocrine, respiratory, cardiovascular, gastrointestinal, musculoskeletal, neurological,  allergy and Immunology is done and is otherwise negative. PHYSICAL EXAM:    Vitals:  BP (!) 80/46   Pulse 90   Resp 25   Ht 4' 10\" (1.473 m)   Wt 89 lb 15.2 oz (40.8 kg)   SpO2 94%   BMI 18.80 kg/m²     General: alert, cooperative, mild distress  Head: normocephalic, atraumatic  Eyes:No gross abnormalities. and PERRL  ENT:  MMM no lesions  Neck:  supple and no masses  Chest : Bibasilar rales, no wheezing, nontender, tympanic  Heart[de-identified] Heart sounds are normal.  Regular rate and rhythm without murmur, gallop or rub. ABD:  symmetric, liver span normal to percussion, soft, non-tender, spleen non-palpable, distended  Musculoskeletal : no cyanosis, no clubbing and 2+  edema  Neuro:  Grossly normal  Skin: No rashes or nodules noted. Lymph node:  no cervical nodes  Urology: Yes Hernandez   Psychiatric: appropriate        Data Review  Recent Labs     05/06/19  1148 05/07/19 1935 05/08/19  0457   WBC 10.2 21.7* 23.8*   HGB 11.8* 12.9 11.4*   HCT 35.9* 39.1 34.2*    189 151      Recent Labs     05/06/19  1148 05/07/19 1935 05/08/19  0457   * 125* 126*   K 2.9* 3.0* 3.7   CL 83* 80* 86*   CO2 29 31 27   BUN 48* 51* 46*   CREATININE 2.01* 1.82* 1.52*   GLUCOSE 108* 138* 129*       MV Settings: ABGs: No results for input(s): PHART, GOF1OCV, PO2ART, ZYP5WBH, BEART, H7TMAJBD, MEJ3ZKA in the last 72 hours.   O2 Device: Nasal cannula  O2 Flow Rate (L/min): 3 L/min  Lab Results   Component Value Date    LACTA 1.6 05/07/2019       Radiology  I personally reviewed imaging studies and  CT abdomen shows bilateral effusions, with adjacent atelectasis,, possible left lower lobe infiltrative changes could be related to prior pneumonia, large ascites        Assessment, plan: This is a critically ill patient at risk of deterioration / death , needing close ICU monitoring and intervention due to below noted problems       · Sepsis with septic shock could be related to peritonitis, versus possible pneumonia, changes at the base of the lung could be related to atelectasis or residual of the old pneumonia. · Ascites etiology is not clear patient has history of breast cancer, needs to rule out, also right ventricle appears enlarged on echo pulmonary hypertension could be underlying etiology, echo result is pending,? Cirrhosis,  · hyponatremia, hypervolemic, secondary to underlying volume overload  · Elevated troponin likely demand ischemia  · Acute kidney injury secondary to #1  · Leukocytosis    Recommendation  · Levophed titrate for meana arterial pressure 65-70  · Zosyn and vancomycin  · Albumin  · Central line placed  · Paracentesis  · If infected fluid will consider left sided chest tube  · Chest x-ray tomorrow  · Liver ultrasound with Doppler  · Bilateral lower extremity ultrasound  · Cytology of ascites fluid  · Watch closely  · Monitor renal function and urine output     Discussed with Dr. Barbara Flynn   [facility]     Due to the immediate potential for life-threatening deterioration due to sepsis and septic shock , I spent 45 minutes providing critical care. This time is excluding time spent performing procedures.     Thank you for consultation    Electronically signed by Jenifer Lorenzo MD, [facility],  on 5/8/2019 at 12:34 PM

## 2019-05-08 NOTE — CONSULTS
Inpatient consult to GI  Consult performed by: Gloria Rocha MD  Consult ordered by: Dwight Green MD        Patient Name: Maureen Kaminski Date: 2019 11:47 PM  MR #: [id number]  : 1940    Attending Physician: Terra Marquez DO  Reason for consult: Coffee ground emesis     History of Presenting Illness:      Shelli Moseley is a 66 y.o. female on hospital day 1 with a history of breast cancer with bilateral mastectomy, HLD, HTN. History Obtained From:  patient  Patient reports about two weeks ago she was diagnosed with pneumonia. She reports she was prescribed with antibiotics. She reports over the past week or so she has become weaker and short of breath. She reports she noticed her abdominal girth growing in size. Patient denies any liver disease or alcohol abuse. She denies abdominal pain, but reports her appetite has declined since she was diagnosed with pneumonia. RN reports the patient experienced a coffee-ground emesis last night x1. No reports of melena or hematochezia. Patient reports she has never undergone any endoscopic evaluations in the past. No family history of colon or gastric cancers. NO NSAID use. Daily ASA. Patient reports she was initially diagnosed with breast cancer in the . She underwent a mastectomy and radiation. She was recently diagnosed with breast cancer again about 3-4 years ago and underwent another mastectomy. RN reports 1800mls of yellow fluid was drained from the patient's abdomen today. Patient short of breath, on oxygen-NC  She denies fever or chills. She denies CP or palpitations. Patient on Levophed 8 mcg/min for BP support during paracentesis. RN weaning down now.      History:      Past Medical History:   Diagnosis Date    Cancer [facility])     breast, radiation and mastectomy    Hyperlipidemia     Hypertension      Past Surgical History:   Procedure Laterality Date    MASTECTOMY      bilateral, first one in 1958, 2nd one few years ago     Family History  Family History   Problem Relation Age of Onset    No Known Problems Mother     Heart Attack Father     Heart Disease Neg Hx     Stroke Neg Hx     Cancer Neg Hx     COPD Neg Hx     Diabetes Neg Hx      Social History     Socioeconomic History    Marital status:      Spouse name: Not on file    Number of children: Not on file    Years of education: Not on file    Highest education level: Not on file   Occupational History    Not on file   Social Needs    Financial resource strain: Not on file    Food insecurity:     Worry: Not on file     Inability: Not on file    Transportation needs:     Medical: Not on file     Non-medical: Not on file   Tobacco Use    Smoking status: Never Smoker    Smokeless tobacco: Never Used   Substance and Sexual Activity    Alcohol use: Not Currently    Drug use: Never    Sexual activity: Not on file   Lifestyle    Physical activity:     Days per week: Not on file     Minutes per session: Not on file    Stress: Not on file   Relationships    Social connections:     Talks on phone: Not on file     Gets together: Not on file     Attends Rastafarian service: Not on file     Active member of club or organization: Not on file     Attends meetings of clubs or organizations: Not on file     Relationship status: Not on file    Intimate partner violence:     Fear of current or ex partner: Not on file     Emotionally abused: Not on file     Physically abused: Not on file     Forced sexual activity: Not on file   Other Topics Concern    Not on file   Social History Narrative    Not on file      Home Medications:      Medications Prior to Admission: potassium chloride (KLOR-CON M) 20 MEQ extended release tablet, Take 20 mEq by mouth 2 times daily  furosemide (LASIX) 20 MG tablet, Take 10 mg by mouth daily  lisinopril-hydrochlorothiazide (PRINZIDE;ZESTORETIC) 20-25 MG per tablet, Take 1 tablet by mouth daily  Calcium Carbonate (CALCIUM 600 PO), Take 600 mg by mouth daily  aspirin 81 MG chewable tablet, Take 81 mg by mouth daily  Cholecalciferol (VITAMIN D3) 1000 units CAPS, Take 1 tablet by mouth daily  simvastatin (ZOCOR) 5 MG tablet, Take 5 mg by mouth nightly  IBANDRONATE SODIUM PO, Take by mouth every 30 days    Current Hospital Medications:   Scheduled Meds:   sodium chloride flush  10 mL Intravenous 2 times per day    pantoprazole  40 mg Intravenous Daily    And    sodium chloride (PF)  10 mL Intravenous Daily    piperacillin-tazobactam  3.375 g Intravenous Q8H    vancomycin (VANCOCIN) intermittent dosing (placeholder)   Other RX Placeholder    calcium elemental  500 mg Oral Daily    vitamin D  1,000 Units Oral Daily    potassium chloride  20 mEq Oral BID    simvastatin  5 mg Oral Nightly    lidocaine        [Held by provider] aspirin  81 mg Oral Daily    [Held by provider] heparin (porcine)  5,000 Units Subcutaneous 3 times per day     Continuous Infusions:   norepinephrine Stopped (05/08/19 0250)     PRN Meds:.sodium chloride flush, magnesium hydroxide, ondansetron, acetaminophen   norepinephrine Stopped (05/08/19 0250)      Allergies:   No Known Allergies   Review of Systems:       [x] CV, Resp, Neuro, , and all other systems reviewed and negative other than listed in HPI.     Objective Findings:     Vitals:   Vitals:    05/08/19 0540 05/08/19 0550 05/08/19 0600 05/08/19 0610   BP: (!) 94/50  (!) 80/46    Pulse: 85 84 88 90   Resp: 19 20 26 25   SpO2:   (!) 80% 94%   Weight:       Height:          Physical Examination:  General: No apparent distress, appears ill and thin, alert and oriented   HEENT: Normocephalic, no scleral icterus. Neck: No JVD. Heart: Regular, no murmur, no rub/gallop. No RV heave. Lungs: Clear to ascultation, no rales/wheezing/rhonchi. Good chest wall excursion. Abdomen: Appearance:, Distension , Soft , tenderness -none, Bowel sounds normal   Extremities: No clubbing/cyanosis, no edema.    Skin: Warm, dry, normal turgor, no rash, no bruise, no petichiae. Neuro: No myoclonus or tremor. Psych: Normal affect    Results/ Medications reviewed 5/8/2019, 12:52 PM     Laboratory, Microbiology, Pathology, Radiology, Cardiology, Medications and Transcriptions reviewed  Scheduled Meds:   sodium chloride flush  10 mL Intravenous 2 times per day    pantoprazole  40 mg Intravenous Daily    And    sodium chloride (PF)  10 mL Intravenous Daily    piperacillin-tazobactam  3.375 g Intravenous Q8H    vancomycin (VANCOCIN) intermittent dosing (placeholder)   Other RX Placeholder    calcium elemental  500 mg Oral Daily    vitamin D  1,000 Units Oral Daily    potassium chloride  20 mEq Oral BID    simvastatin  5 mg Oral Nightly    lidocaine        [Held by provider] aspirin  81 mg Oral Daily    [Held by provider] heparin (porcine)  5,000 Units Subcutaneous 3 times per day     Continuous Infusions:   norepinephrine Stopped (05/08/19 0250)       Recent Labs     05/06/19 1148 05/07/19 1935 05/08/19 0457   WBC 10.2 21.7* 23.8*   HGB 11.8* 12.9 11.4*   HCT 35.9* 39.1 34.2*   MCV 80.2* 81.3* 80.3*    189 151     Recent Labs     05/06/19 1148 05/07/19 1935 05/08/19 0457   * 125* 126*   K 2.9* 3.0* 3.7   CL 83* 80* 86*   CO2 29 31 27   BUN 48* 51* 46*   CREATININE 2.01* 1.82* 1.52*     Recent Labs     05/06/19 1148 05/07/19 1935 05/08/19 0457   AST 32 34 25   ALT 19 18 15   BILITOT 0.6 0.6 0.4   ALKPHOS 46 55 43     Recent Labs     05/07/19 2000   LIPASE 135*     Recent Labs     05/06/19 1148 05/07/19 1935 05/07/19 2000 05/08/19 0457 05/08/19  1128   PROT 5.7* 6.6  --  5.3*  --    INR  --   --  1.1  --  1.1     Ct Abdomen Pelvis Wo Contrast Additional Contrast? None  Result Date: 5/8/2019  1. Bilateral pleural effusions with consolidation. The right pleural effusion is larger than left and the left pleural effusion may be loculated 2. A very large volume of ascites.  3. The collecting systems of both kidneys are mildly prominent. No obstructing calcifications are nephrolithiasis seen. 4. Mild compression deformity is seen at L4 that is age indeterminate. Xr Chest Standard (2 Vw)  Result Date: 4/27/2019  BILATERAL PLEURAL EFFUSIONS WITH BIBASILAR ATELECTASIS/PNEUMONIA. Xr Chest Portable  Result Date: 5/8/2019  1. Bilateral pleural effusions with consolidation. The right pleural effusion is larger than left and the left pleural effusion may be loculated 2. A very large volume of ascites. 3. The collecting systems of both kidneys are mildly prominent. No obstructing calcifications are nephrolithiasis seen. 4. Mild compression deformity is seen at L4 that is age indeterminate. All CT scans at this facility use dose modulation, iterative reconstruction, and/or weight based dosing when appropriate to reduce radiation dose to as low as reasonably achievable. Impression:   66year old female patient presents to [facility] with increased weakness, shortness of breath, anorexia, and increased abdominal girth. She was recently diagnosed with pneumonia. Patient with a history of breast cancer and bilateral mastectomy. Her liver function tests are unremarkable. Albumin 2.6/protein 5.3. Patient underwent a paracentesis today. She will undergo liver doppler and ultrasound today. CT scan reports bilateral pleural effusions with consolidation. Large volume ascites. Concern for intra-abdominal malignancy. She did experience one coffee ground emesis last night. Her HGB is stable. No overt signs of active GI bleed. She is hemodynamically stable. Patient has never undergone any endoscopic evaluations in the past. Will defer any endoscopies at this time, unless active bleed occurs. Plan:   - Await ascitic fluid analysis  - CT abdomen with contrast  - Increase PPI BID  - Monitor H and H   Comments: Thank you for allowing us to participate in the care of this patient. Will continue to follow.     Please call if questions or concerns arise.    Electronically signed by Roberto Carlos Zarco MD on 5/8/2019 at 12:52 PM    Please note this report has been partially produced using speech recognition software and may cause contain errors related to that system including grammar, punctuation and spelling as well as words and phrases that may seem inappropriate. If there are questions or concerns please feel free to contact me to clarify.

## 2019-05-08 NOTE — CONSULTS
Consult Note  Patient: Bronson Burns  Unit/Bed:  IC10/IC10-01  YOB: 1940  MRN: [id number]  Acct: [de-identified]   Admitting Diagnosis: Hypovolemia [E86.1]  Hypotension [I95.9]  Admit Date:  5/7/2019  Hospital Day: 1      Chief Complaint:  SOB    History of Present Illness:  35-year-old female was brought into the emergency department complaining of severe shortness of breath. She states that the red around Easter time she was diagnosed with pneumonia she still had a lot of fatigue just wasn't quite feeling like herself yesterday the shortness of breath really progressively worsened and she was brought into the emergency department she was found to be hypotensive they admitted her up to ICU put her on levophed drip kindly asked cardiology get involved with her case. She states that she has had a low-grade fever, cough, shortness of breath and fatigue. She has not felt like herself. She has 2 daughters in at the bedside able to assist on giving a lot of her past medical history.   She states her only past medical includes hypertension she denies ever having any heart disease in the past.    No Known Allergies    Current Facility-Administered Medications   Medication Dose Route Frequency Provider Last Rate Last Dose    sodium chloride flush 0.9 % injection 10 mL  10 mL Intravenous 2 times per day MITCH Irvin CNP   10 mL at 05/08/19 0844    sodium chloride flush 0.9 % injection 10 mL  10 mL Intravenous PRN MITCH Irvin CNP        magnesium hydroxide (MILK OF MAGNESIA) 400 MG/5ML suspension 30 mL  30 mL Oral Daily PRN MITCH Irvin CNP        ondansetron (ZOFRAN) injection 4 mg  4 mg Intravenous Q6H PRN MITCH Irvin CNP   4 mg at 05/08/19 0324    heparin (porcine) injection 5,000 Units  5,000 Units Subcutaneous 3 times per day MITCH Irvin CNP   5,000 Units at 05/08/19 0546    pantoprazole (PROTONIX) injection 40 mg  40 mg Intravenous Daily Padilla Latham MITCH Mahan - CNP   40 mg at 05/08/19 0844    And    sodium chloride (PF) 0.9 % injection 10 mL  10 mL Intravenous Daily Ivethpeterson Alfaro APRN - CNP   10 mL at 05/08/19 0845    acetaminophen (TYLENOL) tablet 650 mg  650 mg Oral Q4H PRN Iveth Alfaro APRN - CNP        piperacillin-tazobactam (ZOSYN) 3.375 g in dextrose 5 % 50 mL IVPB extended infusion (mini-bag)  3.375 g Intravenous Q8H MITCH Reveles - CNP 12.5 mL/hr at 05/08/19 0843 3.375 g at 05/08/19 0843    vancomycin (VANCOCIN) 500 mg in dextrose 5% 100 mL IVPB  15 mg/kg Intravenous Q48H Iveth Alfaro APRN - CNP   Stopped at 05/08/19 0615    vancomycin (VANCOCIN) intermittent dosing (placeholder)   Other RX Placeholder Beverly Mahan APRN - CNP        aspirin chewable tablet 81 mg  81 mg Oral Daily BeverlyMITCH Sharp - CNP        calcium elemental (OSCAL) tablet 500 mg  500 mg Oral Daily Beverlyvalencia Mahan APRN - CNP        vitamin D (CHOLECALCIFEROL) tablet 1,000 Units  1,000 Units Oral Daily MITCH Reveles - CNP        potassium chloride (KLOR-CON M) extended release tablet 20 mEq  20 mEq Oral BID MITCH Reveles - CNP        simvastatin (ZOCOR) tablet 5 mg  5 mg Oral Nightly Beverlyvalencia Mahan APRN - CNP        albumin human 25 % solution 50 g  50 g Intravenous Once Davis Akins MD        norepinephrine (LEVOPHED) 16 mg in dextrose 5 % 250 mL infusion  2 mcg/min Intravenous Continuous Treva Gillis MD   Stopped at 05/08/19 0250       PMHx:  Past Medical History:   Diagnosis Date    Cancer [facility])     breast, radiation and mastectomy    Hyperlipidemia     Hypertension        PSHx:  Past Surgical History:   Procedure Laterality Date    MASTECTOMY      bilateral, first one in 1958, 2nd one few years ago       Social Hx:  Social History     Socioeconomic History    Marital status:      Spouse name: Not on file    Number of children: Not on file    Years of education: Not on file    Highest education level: Not on file   Occupational History    Not on file   Social Needs    Financial resource strain: Not on file    Food insecurity:     Worry: Not on file     Inability: Not on file    Transportation needs:     Medical: Not on file     Non-medical: Not on file   Tobacco Use    Smoking status: Never Smoker    Smokeless tobacco: Never Used   Substance and Sexual Activity    Alcohol use: Not Currently    Drug use: Never    Sexual activity: Not on file   Lifestyle    Physical activity:     Days per week: Not on file     Minutes per session: Not on file    Stress: Not on file   Relationships    Social connections:     Talks on phone: Not on file     Gets together: Not on file     Attends Christian service: Not on file     Active member of club or organization: Not on file     Attends meetings of clubs or organizations: Not on file     Relationship status: Not on file    Intimate partner violence:     Fear of current or ex partner: Not on file     Emotionally abused: Not on file     Physically abused: Not on file     Forced sexual activity: Not on file   Other Topics Concern    Not on file   Social History Narrative    Not on file       Family Hx:  Family History   Problem Relation Age of Onset    No Known Problems Mother     Heart Attack Father     Heart Disease Neg Hx     Stroke Neg Hx     Cancer Neg Hx     COPD Neg Hx     Diabetes Neg Hx        Review of Systems:   Review of Systems   Constitutional: Positive for fatigue and fever. Respiratory: Positive for shortness of breath. Cardiovascular: Positive for palpitations. Physical Examination:    BP (!) 80/46   Pulse 90   Resp 25   Ht 4' 10\" (1.473 m)   Wt 89 lb 15.2 oz (40.8 kg)   SpO2 94%   BMI 18.80 kg/m²    Physical Exam   Constitutional: She is oriented to person, place, and time. She appears well-developed and well-nourished. HENT:   Head: Normocephalic. Eyes: Pupils are equal, round, and reactive to light. Neck: No JVD present. No tracheal deviation present. No thyromegaly present. Cardiovascular: Normal rate, regular rhythm, normal heart sounds and intact distal pulses. Exam reveals no gallop and no friction rub. No murmur heard. Pulmonary/Chest: Effort normal. No stridor. No respiratory distress. She has no wheezes. She has rales. She exhibits no tenderness. Abdominal: Soft. Bowel sounds are normal.   Musculoskeletal: Normal range of motion. She exhibits no edema or tenderness. Lymphadenopathy:     She has no cervical adenopathy. Neurological: She is alert and oriented to person, place, and time. Skin: Skin is warm and dry. Psychiatric: She has a normal mood and affect.        LABS:  CBC:  Lab Results   Component Value Date    WBC 23.8 05/08/2019    RBC 4.27 05/08/2019    HGB 11.4 05/08/2019    HCT 34.2 05/08/2019    MCV 80.3 05/08/2019    MCH 26.7 05/08/2019    MCHC 33.3 05/08/2019    RDW 14.9 05/08/2019     05/08/2019     CBC with Differential:   Lab Results   Component Value Date    WBC 23.8 05/08/2019    RBC 4.27 05/08/2019    HGB 11.4 05/08/2019    HCT 34.2 05/08/2019     05/08/2019    MCV 80.3 05/08/2019    MCH 26.7 05/08/2019    MCHC 33.3 05/08/2019    RDW 14.9 05/08/2019    LYMPHOPCT 1.0 05/08/2019    MONOPCT 2.6 05/08/2019    BASOPCT 0.2 05/08/2019    MONOSABS 0.7 05/08/2019    LYMPHSABS 0.2 05/08/2019    EOSABS 0.0 05/08/2019    BASOSABS 0.0 05/08/2019     CMP:    Lab Results   Component Value Date     05/08/2019    K 3.7 05/08/2019    CL 86 05/08/2019    CO2 27 05/08/2019    BUN 46 05/08/2019    CREATININE 1.52 05/08/2019    GFRAA 39.9 05/08/2019    LABGLOM 33.0 05/08/2019    GLUCOSE 129 05/08/2019    PROT 5.3 05/08/2019    LABALBU 2.6 05/08/2019    CALCIUM 7.6 05/08/2019    BILITOT 0.4 05/08/2019    ALKPHOS 43 05/08/2019    AST 25 05/08/2019    ALT 15 05/08/2019     BMP:    Lab Results   Component Value Date     05/08/2019    K 3.7 05/08/2019    CL 86 05/08/2019    CO2 27 05/08/2019 BUN 46 05/08/2019    LABALBU 2.6 05/08/2019    CREATININE 1.52 05/08/2019    CALCIUM 7.6 05/08/2019    GFRAA 39.9 05/08/2019    LABGLOM 33.0 05/08/2019    GLUCOSE 129 05/08/2019     Magnesium:    Lab Results   Component Value Date    MG 2.1 05/08/2019     Troponin:    Lab Results   Component Value Date    TROPONINI <0.010 05/08/2019       EKG: sinus tachycardia      Assessment:    Active Hospital Problems    Diagnosis Date Noted    Hypotension [I95.9] 05/08/2019    Elevated troponin [R74.8] 05/08/2019    Dyspnea [R06.00] 05/08/2019    Electrolyte abnormality [E87.8] 05/08/2019    Non-intractable vomiting with nausea [R11.2] 05/08/2019    Weakness [R53.1] 05/08/2019    Pneumonia [J18.9] 05/07/2019      Pneumonia- continue IV ATB'S  Hypotension- slowly wean off levophed drip  Hyponatremia  DOT  Hypokalemia-Monitor potassium levels and keep greater than 4.0  tachycardia  Elevated trop- resolved  Plan:  1. ICU monitoring  2. Check 2D Echo for LV function, PA pressures, wall motion abnormalities and any significant valvular disease. 3. Monitor magnesium levels and keep greater 2.0  4. Thank you for allowing me to participate in the care of your patient, please don't hesitate to contact me if you have any further questions.             Electronically signed by Esequiel Lennox, APRN - CNP on 5/8/2019 at 10:25 AM

## 2019-05-08 NOTE — ED NOTES
Dr. Elsa Aj at bedside with patient and her daughter, patient verbalizes she would like to be a full code and have CPR and ventilator if needed.        Madhu Mata RN  05/07/19 7905

## 2019-05-08 NOTE — PROCEDURES
Internal jugular central venous catheter; Ultrasound guided. [id number]                                                             [id number]    INDICATION:   sepsis      CONSENT:  The patient was counseled regarding the procedure, it's indications, risks, potential complications and alternatives and any questions were answered. Consent was obtained. PROCEDURE SUMMARY:   A time-out was performed. The patient's right neck region was prepped and draped in sterile fashion. The right internal jugular vein was accessed under ultrasound guidance using a finder needle and sheath. U/S images were permanently documented. Anesthesia was achieved with 1% lidocaine. The finder needle was inserted into the right neck under direct ultrasound guidance, and venous blood was withdrawn. The introducer needle was then inserted under direct ultrasound guidance and venous blood was withdrawn into the syringe. The syringe was removed and the guidewire was advanced through the introducer needle. A small incision was made with a scalpel and the introducer needle was removed. A dilator was advanced over the guidewire until appropriate dilation was obtained. The dilator was removed and an central venous  catheter was advanced over the guidewire and secured into place with 2 sutures at 13 cm. At time of procedure completion, all ports aspirated and flushed properly. Initially line was placed at 16 cm, chest x-ray shows deep insertion, retracted 3 cm up and sutured in place  Estimated Blood loss   less than 2 cc    COMPLICATIONS:  None    PROCEDURE:  Diagnostic & Therapeutic Paracentesis , U/S guided. [id number]    DIAGNOSES:  INDICATION:  Ascites. Suspected SBP. CONSENT:  The patient was counseled regarding the procedure, it's indications, risks, potential complications and alternatives and any questions were answered. Consent was obtained. PROCEDURE SUMMARY:  A time-out was performed.  The area of the  left abdomen was prepped and draped in a sterile fashion  using chlorhexidine scrub. 1% lidocaine was used to numb the region. The skin was incised 1.5 mm using a 10 blade  scalpel. The paracentesis catheter was inserted and advanced with negative pressure under ultrasound guidance. No blood was aspirated. Clear yellow fluid was retrieved and  collected. Approximately 65 mL of ascitic fluid was collected and sent for laboratory analysis. The catheter was then  connected to the vaccutainer and 1800  liters of additional ascitic fluid were drained. The catheter was removed and  no leaking was noted. 50 g of albumin was given . The patient tolerated the procedure  well without any immediate complications.      Fluid color is dark yellow  ESTIMATED BLOOD LOSS:  < 1 CC     COMPLICATIONS:  None

## 2019-05-08 NOTE — PROGRESS NOTES
Nutrition Assessment    Type and Reason for Visit: Initial, Positive Nutrition Screen(wt loss/poor appetite)    Nutrition Recommendations:   Continue NPO. Monitor for ability to advance to regular diet with tolerance    Nutrition Assessment: Pt nutritionally compromised on admission evidenced by weight loss and poor appetite pta. At risk for further compromise due to NPO due to concern for GI bleed- positive occult stool. Will monitor nutrition progression    Malnutrition Assessment:  · Malnutrition Status: Insufficient data  · Context: Acute illness or injury  · Findings of the 6 clinical characteristics of malnutrition (Minimum of 2 out of 6 clinical characteristics is required to make the diagnosis of moderate or severe Protein Calorie Malnutrition based on AND/ASPEN Guidelines):  1. Energy Intake-Unable to assess, Unable to assess    2. Weight Loss-5% loss or greater, in 1 month  3. Fat Loss-Unable to assess,    4. Muscle Loss-Unable to assess,    5. Fluid Accumulation-Moderate to severe fluid accumulation, Ascites  6.  Strength-Not measured    Nutrition Risk Level: High    Nutrient Needs:  · Estimated Daily Total Kcal: 3707-4522 (kg x 28-30)(? risk for refeeding)  · Estimated Daily Protein (g): 61-73 (kg x 1.5-1.8)  · Estimated Daily Total Fluid (ml/day): 3209-0444 (1 ml/kcal)    Nutrition Diagnosis:   · Problem: Inadequate oral intake  · Etiology: related to Alteration in GI function     Signs and symptoms:  as evidenced by NPO status due to medical condition    Objective Information:  · Nutrition-Focused Physical Findings: 'massive ascites'. per rounds coffee ground emesis with + occult blood. GI C/S. CVC. Hx includes HTN, AL, breast cancer.  Labs: Na+ 126, BUN/Cr 46/1.52  · Wound Type: None  · Current Nutrition Therapies:  · Oral Diet Orders: NPO   · Anthropometric Measures:  · Ht: 4' 10\" (147.3 cm)   · Admission Body Wt: 89 lb (40.4 kg)(? source)  · Usual Body Wt: 95 lb (43.1 kg)(4/27/19 per EMR)  · % Weight Change:  ,  6% in 1 month  · Ideal Body Wt: 96 lb (43.5 kg), % Ideal Body 92%  · BMI Classification: BMI 18.5 - 24.9 Normal Weight    Nutrition Interventions:   Continue NPO(Monitor for ability to advance to regular diet with tolerance)  Continued Inpatient Monitoring, Education Not Indicated    Nutrition Evaluation:   · Evaluation: Goals set   · Goals: ability to initiate po diet with tolerance vs need for TPN    · Monitoring: Nutrition Progression, Weight, Pertinent Labs, Monitor Bowel Function, I&O      Electronically signed by Payton Sanders RD, LD on 5/8/19 at 3:24 PM

## 2019-05-08 NOTE — PROGRESS NOTES
Pharmacy Note  Vancomycin Consult    Pilo Sepulveda is a 66 y.o. female started on Vancomycin for pneumonia; consult received from MITCH Sandoval CNP to manage therapy. Also receiving the following antibiotics: Zosyn. Patient Active Problem List   Diagnosis    Pneumonia    Hypotension    Elevated troponin    Dyspnea    Electrolyte abnormality    Non-intractable vomiting with nausea    Weakness       Allergies:  Patient has no known allergies. Temp max: 99.4 *F    Recent Labs     05/06/19  1148 05/07/19  1935   BUN 48* 51*       Recent Labs     05/06/19  1148 05/07/19  1935   CREATININE 2.01* 1.82*       Recent Labs     05/06/19  1148 05/07/19  1935   WBC 10.2 21.7*       No intake or output data in the 24 hours ending 05/08/19 0122    Culture Date      Source                       Results  5/7/19                 Blood x2                      Pending      Ht Readings from Last 1 Encounters:   05/08/19 4' 10\" (1.473 m)        Wt Readings from Last 1 Encounters:   05/08/19 89 lb 15.2 oz (40.8 kg)         Body mass index is 18.8 kg/m². CrCl cannot be calculated (Unknown ideal weight. ). Assessment/Plan:  Will initiate vancomycin 500 mg (15 mg/kg) IV every 48 hours. Initial trough scheduled for prior to the 3rd dose on 5/12/19 at approx 0200. Timing of future trough levels will be determined based on culture results, renal function, and clinical response. Thank you for the consult. Will continue to follow.

## 2019-05-09 NOTE — FLOWSHEET NOTE
Dr. Mariola Andres plans on performing Bronchoscopy. Consent obtained from pt herself. pts  and daughter at bedside both aware. Pt remains alert and orienetd. Pt remaisn on 100% NRB. resp even and unlabored. sats remain 100%. Lungs remain rhonchorus. Will continue to monitor.   Mouth swabbed with water

## 2019-05-09 NOTE — PROGRESS NOTES
Pulmonary & Critical Care Medicine ICU Progress Note  Chief complaint : shock     Subjunctive/24 hour events :   Patient seen and examined during multidisciplinary rounds with RN, charge nurse, RT, pharmacy, dietitian, and social service. Patient was   somnolent earlier today, later in the day improved became more interactive, Levophed initially was at 0.5 mcg/kg/m, currently were able to drop it  slightly but now back on increasing dose, became hypoxic through today and chest x-ray showed complete left lung collapse for which we did bronchoscopy with airway clearance. She denies chest pain, she has shortness of breath, no fever, urine output is good, no bowel movement, she underwent thoracentesis today with removal of 900 mL, she was on 100% nonrebreather due to lung collapse and now placed on BiPAP after bronchoscopy. Social History     Tobacco Use    Smoking status: Never Smoker    Smokeless tobacco: Never Used   Substance Use Topics    Alcohol use: Not Currently         Problem Relation Age of Onset    No Known Problems Mother     Heart Attack Father     Heart Disease Neg Hx     Stroke Neg Hx     Cancer Neg Hx     COPD Neg Hx     Diabetes Neg Hx        Recent Labs     05/08/19  2223   PHART 7.456*   [id number] 50*   PO2ART 62*       MV Settings:     / / /FiO2 : 50 %           IV:   sodium chloride      norepinephrine 2.5 mcg/min (05/09/19 1100)       Vitals:  /60   Pulse 99   Temp 98 °F (36.7 °C) (Axillary)   Resp (!) 36   Ht 4' 10\" (1.473 m)   Wt 110 lb 3.7 oz (50 kg)   SpO2 95%   BMI 23.04 kg/m²    Tmax:        Intake/Output Summary (Last 24 hours) at 5/9/2019 1656  Last data filed at 5/9/2019 0435  Gross per 24 hour   Intake 1196.27 ml   Output 4100 ml   Net -2903.73 ml       EXAM:  General: Sleepy, however easily arousable and answers question and interactive  Head: normocephalic, atraumatic  Eyes:No gross abnormalities. , PERRL and Sclera nonicteric  ENT:  MMM no lesions  Neck:  supple, no masses and R IJ TLC   Chest : clear to auscultation bilaterally- no wheezes, rales or rhonchi, normal air movement, no respiratory distress  Heart[de-identified] Heart sounds are normal.  Regular rate and rhythm without murmur, gallop or rub. ABD:  symmetric, soft, non-tender, less distended  Musculoskeletal : no cyanosis, no clubbing and no edema  Neuro:  Grossly normal  Skin: No rashes or nodules noted.   Lymph node:  no cervical nodes  Urology: Yes Hernandez   Psychiatric: appropriate    Medications:  Scheduled Meds:   vancomycin  750 mg Intravenous Q24H    propofol        fentaNYL        sodium chloride flush  10 mL Intravenous 2 times per day    piperacillin-tazobactam  3.375 g Intravenous Q8H    vancomycin (VANCOCIN) intermittent dosing (placeholder)   Other RX Placeholder    calcium elemental  500 mg Oral Daily    vitamin D  1,000 Units Oral Daily    potassium chloride  20 mEq Oral BID    simvastatin  5 mg Oral Nightly    aspirin  81 mg Oral Daily    heparin (porcine)  5,000 Units Subcutaneous 3 times per day    pantoprazole  40 mg Intravenous BID    And    sodium chloride (PF)  10 mL Intravenous BID    ipratropium-albuterol  1 ampule Inhalation Q4H WA       PRN Meds:  sodium chloride flush, magnesium hydroxide, ondansetron, acetaminophen, lidocaine    Results: reviewed by me   CBC:   Recent Labs     05/07/19 1935 05/08/19 0457 05/08/19 2148 05/09/19  0454   WBC 21.7* 23.8*  --  14.0*   HGB 12.9 11.4* 10.3* 10.7*   HCT 39.1 34.2* 30.6* 33.0*   MCV 81.3* 80.3*  --  80.7*    151  --  124*     BMP:   Recent Labs     05/07/19 1935 05/08/19 0457 05/09/19  0507   * 126* 133*   K 3.0* 3.7 3.2*   CL 80* 86* 87*   CO2 31 27 33*   BUN 51* 46* 30*   CREATININE 1.82* 1.52* 1.17*     LIVER PROFILE:   Recent Labs     05/07/19 1935 05/07/19 2000 05/08/19 0457 05/09/19  0507 05/09/19  0807   AST 34  --  25 20  --    ALT 18  --  15 11  --    LIPASE  --  135*  --   --  59   BILITOT excluding time spent performing procedures.           Electronically signed by Maddie Hoffman MD,  [facility] ,on 5/9/2019 at 4:56 PM

## 2019-05-09 NOTE — FLOWSHEET NOTE
Took over care of patient alongside with Yohana Iglesias RN. Daughters were at bedside as we did head to toe assessment and gave medications. Patient is alert and oriented x4, had no c/o pain or SOB. Patient refused to take potassium pill states she was unable to swallow  pill as it was too large for her to swallow, even split in half, and that it makes her feel nauseous and felt like vomiting. She was on 3L of O2 nasal cannula, had to be placed on a non rebreather when her O2 sat climbed down into mid 80%. She has been resting comfortably and O2 Sat is 99% with non rebreather mask still in place. Upper lung bases are clear and lower lung bases are diminished with no sounds of fluid accumulation. 1 daughter is at bedside.  Electronically signed by Spike Christensen RN on 5/8/2019 at 9:59 PM

## 2019-05-09 NOTE — PROGRESS NOTES
Progress Note  Patient: Dominic Marquez  Unit/Bed: IC10/IC10-01  YOB: 1940  MRN: [id number]  Acct: [de-identified]   Admitting Diagnosis: Hypovolemia [E86.1]  Hypotension [I95.9]  Admit Date:  5/7/2019  Hospital Day: 2    Chief Complaint: sepsis resp failure PA HTn pna     Histories:  Past Medical History:   Diagnosis Date    Cancer [facility])     breast, radiation and mastectomy    Hyperlipidemia     Hypertension      Past Surgical History:   Procedure Laterality Date    MASTECTOMY      bilateral, first one in 80, 2nd one few years ago     Family History   Problem Relation Age of Onset    No Known Problems Mother     Heart Attack Father     Heart Disease Neg Hx     Stroke Neg Hx     Cancer Neg Hx     COPD Neg Hx     Diabetes Neg Hx      Social History     Socioeconomic History    Marital status:      Spouse name: None    Number of children: None    Years of education: None    Highest education level: None   Occupational History    None   Social Needs    Financial resource strain: None    Food insecurity:     Worry: None     Inability: None    Transportation needs:     Medical: None     Non-medical: None   Tobacco Use    Smoking status: Never Smoker    Smokeless tobacco: Never Used   Substance and Sexual Activity    Alcohol use: Not Currently    Drug use: Never    Sexual activity: None   Lifestyle    Physical activity:     Days per week: None     Minutes per session: None    Stress: None   Relationships    Social connections:     Talks on phone: None     Gets together: None     Attends Mandaeism service: None     Active member of club or organization: None     Attends meetings of clubs or organizations: None     Relationship status: None    Intimate partner violence:     Fear of current or ex partner: None     Emotionally abused: None     Physically abused: None     Forced sexual activity: None   Other Topics Concern    None   Social History Narrative    None Subjective/HPI on 100% NR. Weak does not feel well. On high dose Levophed. No F overnight. EKG:SR        Review of Systems:   Review of Systems   Constitutional: Positive for fatigue. Negative for diaphoresis. HENT: Negative. Eyes: Negative. Respiratory: Positive for shortness of breath. Negative for cough, chest tightness, wheezing and stridor. Cardiovascular: Negative. Negative for chest pain, palpitations and leg swelling. Gastrointestinal: Negative. Negative for blood in stool and nausea. Genitourinary: Negative. Musculoskeletal: Negative. Skin: Negative. Neurological: Positive for weakness. Negative for dizziness, syncope and light-headedness. Hematological: Negative. Psychiatric/Behavioral: Negative. Physical Examination:    BP (!) 95/44   Pulse 96   Temp 98 °F (36.7 °C) (Axillary)   Resp 24   Ht 4' 10\" (1.473 m)   Wt 110 lb 3.7 oz (50 kg)   SpO2 97%   BMI 23.04 kg/m²    Physical Exam   Constitutional: No distress. She appears chronically ill and acutely ill. HENT:   Normal cephalic and Atraumatic   Eyes: Pupils are equal, round, and reactive to light. Neck: Normal range of motion and thyroid normal. Neck supple. No JVD present. No neck adenopathy. No thyromegaly present. Cardiovascular: Normal rate, regular rhythm, intact distal pulses and normal pulses. Murmur heard. Pulmonary/Chest: Effort normal. She has no wheezes. She has bibasilar rales. She exhibits no tenderness. Abdominal: Soft. Bowel sounds are normal. There is no tenderness. Musculoskeletal: Normal range of motion. She exhibits no edema or tenderness. Neurological: She is alert and oriented to person, place, and time. Skin: Skin is warm. No cyanosis. Nails show no clubbing.        LABS:  CBC:   Lab Results   Component Value Date    WBC 14.0 05/09/2019    RBC 4.09 05/09/2019    HGB 10.7 05/09/2019    HCT 33.0 05/09/2019    MCV 80.7 05/09/2019    MCH 26.3 05/09/2019    MCHC 32.6 05/09/2019    RDW 14.7 05/09/2019     05/09/2019     CBC with Differential:    Lab Results   Component Value Date    WBC 14.0 05/09/2019    RBC 4.09 05/09/2019    HGB 10.7 05/09/2019    HCT 33.0 05/09/2019     05/09/2019    MCV 80.7 05/09/2019    MCH 26.3 05/09/2019    MCHC 32.6 05/09/2019    RDW 14.7 05/09/2019    LYMPHOPCT 4.2 05/09/2019    MONOPCT 10.0 05/09/2019    BASOPCT 0.1 05/09/2019    MONOSABS 1.4 05/09/2019    LYMPHSABS 0.6 05/09/2019    EOSABS 0.0 05/09/2019    BASOSABS 0.0 05/09/2019     CMP:    Lab Results   Component Value Date     05/09/2019    K 3.2 05/09/2019    CL 87 05/09/2019    CO2 33 05/09/2019    BUN 30 05/09/2019    CREATININE 1.17 05/09/2019    GFRAA 54.0 05/09/2019    LABGLOM 44.6 05/09/2019    GLUCOSE 105 05/09/2019    PROT 5.2 05/09/2019    LABALBU 2.9 05/09/2019    CALCIUM 7.8 05/09/2019    BILITOT 0.9 05/09/2019    ALKPHOS 36 05/09/2019    AST 20 05/09/2019    ALT 11 05/09/2019     BMP:    Lab Results   Component Value Date     05/09/2019    K 3.2 05/09/2019    CL 87 05/09/2019    CO2 33 05/09/2019    BUN 30 05/09/2019    LABALBU 2.9 05/09/2019    CREATININE 1.17 05/09/2019    CALCIUM 7.8 05/09/2019    GFRAA 54.0 05/09/2019    LABGLOM 44.6 05/09/2019    GLUCOSE 105 05/09/2019     Magnesium:    Lab Results   Component Value Date    MG 1.7 05/09/2019     Troponin:    Lab Results   Component Value Date    TROPONINI <0.010 05/08/2019        Active Hospital Problems    Diagnosis Date Noted    Hypotension [I95.9] 05/08/2019     Priority: Low    Elevated troponin [R74.8] 05/08/2019     Priority: Low    Dyspnea [R06.00] 05/08/2019     Priority: Low    Electrolyte abnormality [E87.8] 05/08/2019     Priority: Low    Non-intractable vomiting with nausea [R11.2] 05/08/2019     Priority: Low    Weakness [R53.1] 05/08/2019     Priority: Low    [facility] [A41.9]      Priority: Low    Pneumonia [J18.9] 05/07/2019     Priority: Low        Assessment/Plan:  1.  Resp

## 2019-05-09 NOTE — PROGRESS NOTES
[facility] Occupational Therapy      Date: 2019  Patient Name: Mary Rowell        MRN: [id number]  Account: [de-identified]   : 1940  (66 y.o.)  Room: [place] [address]    Chart reviewed, attempted OT at 1330 for eval. Patient not seen 2° to:    [x] Hold per nsg request    [] Pt declined     [] Pt. off floor for test/procedure. Spoke to Marcello Brothers, RN aware. Will attempt again when able.     Electronically signed by SARA Anton on 2019 at 1:35 PM

## 2019-05-09 NOTE — SEDATION DOCUMENTATION
-NO SEDATION-     Pt arrived to special via cart, with ICU monitors and ICU RN, Kenny Isbell. Pt in semi fowlers position on 15L NRB mask. Pt A&Ox4, pt tachypnic, but respirations even and unlabored at rest, skin p/w/d. VSS. Pt transferred onto procedure table, laying on left side with right side up. Consent verified. Dr. Renae Mccall notified pt ready. Dr. Renae Mccall arrived, assessed pt. Time out complete. Posterior lung fields scanned with ultrasound by Dr. Renae Mccall and images reviewed. Site marked by Dr. Renae Mccall, then procedure site prepped with chloraprep and, once dry, draped with sterile drape and towels. Right posterior chest site then numbed with lidocaine 2% by Dr Renae Mccall, [id number] Centesis 5F catheter placed into pleural space using US guidance. Fluid draining appears clear, yellow. Sample of fluid obtained and placed into specimen containers to be sent for testing as ordered. Catheter attached to vacuum bottle to drain. Total 790 ml removed. Catheter removed. Bandaid applied. VSS. Pt tolerated well. Verbal and tactile reassurance given throughout. Pt taken for CXR with ICU RN, Kneny Isbell, and specimen fluid taken to lab. Electronically signed by Surya Nguyen RN on 5/9/2019 at 10:23 AM

## 2019-05-09 NOTE — FLOWSHEET NOTE
Bronch procedure done. Pt tolerated well. Plan to put pt on Bipap. Pt opens eyes to name.   sats remain 98% at this time

## 2019-05-09 NOTE — PROGRESS NOTES
Gastroenterology Progress Note      Shaunna Joaquin   Hospitalization Day:2    Chief C/o: Coffee ground emesis    SUBJECTIVE: Patient short of breath. Her daughters are at the bedside. Patient requiring non-re breather mask to help keep SPO2 up. Patient on Levophed for blood pressure support. Patient's daughters report she will undergo a thoracentesis today. RN denies any further episodes of coffee-ground emesis. RN denies melena or hematochezia. Patient denies abdominal pain, N/V. Patient denies CP or palpitations. The daughter reports two weeks ago her mother was active and independent before she became ill with pneumonia. ROS GI: As mentioned above     Physical    VITALS:  BP (!) 95/44   Pulse 96   Temp 98 °F (36.7 °C) (Axillary)   Resp 24   Ht 4' 10\" (1.473 m)   Wt 110 lb 3.7 oz (50 kg)   SpO2 97%   BMI 23.04 kg/m²   TEMPERATURE:  Current - Temp: 98 °F (36.7 °C);  Max - Temp  Av.1 °F (36.7 °C)  Min: 97.7 °F (36.5 °C)  Max: 98.7 °F (37.1 °C)    General- Appears acutely ill   Eyes- anicteric sclera, no pallor  Cardiovascular- RRR withtout murmur  Lungs- diminishded to auscultation bilaterally, lower lobes absent LS bilaterally  Abdomen soft, +distended, nontender, no organomegaly, Bowel sounds normal   Extremities- without edema  Skin- without jaundice    Data      Recent Labs     198 19  0454   WBC 21.7* 23.8*  --  14.0*   HGB 12.9 11.4* 10.3* 10.7*   HCT 39.1 34.2* 30.6* 33.0*   MCV 81.3* 80.3*  --  80.7*    151  --  124*     Recent Labs     19  0507   * 126* 133*   K 3.0* 3.7 3.2*   CL 80* 86* 87*   CO2 31 27 33*   BUN 51* 46* 30*   CREATININE 1.82* 1.52* 1.17*     Recent Labs     19  1935 19  0457 19  0507   AST 34 25 20   ALT 18 15 11   BILITOT 0.6 0.4 0.9*   ALKPHOS 55 43 36*     Recent Labs     19   LIPASE 135*     Recent Labs     19  1128   PROTIME 10.9 11.2   INR 1.1 1.1     ASSESSMENT :  66year old female patient with recent treatment for pneumonia. Patient with increased weakness, shortness of breath, anorexia, and ascites over the past week or so. Patient experienced one episode of coffee ground emesis. Her HGB is stable. No overt signs of active GI bleed. Patient underwent paracentesis on 5/8/19. Total protein on ascitic fluid 2.3, suggesting exudate. Patient without any tenderness on abdominal exam excluding peritonitis. She has a history of breast cancer with bilateral mastectomy. Hypoalbuminemia and appears cachectic. Abdominal ultrasound reports- upper abdominal ascites and right pleural effusion. Hepatic echo texture WNL with normal direction of flow on liver vascular ultrasound. Small foci of gallbladder wall thickening. Non contributory pancreatic evaluation. Patient with increased shortness of breath, will undergo thoracentesis today. WBC 14.0, she is on vancomycin. Concern for intra-abdominal neoplastic process     PLAN:  - Await cytology from ascitic fluid  - Will recheck Lipase  - PPI     Thank you for allowing me to participate in the care of your patient.   Feel free to contact me with any questions or concerns    Puma Boucher MD

## 2019-05-09 NOTE — FLOWSHEET NOTE
Sample obtained and placed into sputum trap.  sats remain 98% during procedure. Pt remains on NRB.   BP 76/33 MAP o f44  Levo gtt increased to 5 mcg

## 2019-05-09 NOTE — FLOWSHEET NOTE
Spoke with physician. Received orders for ABGs. Patient also placed on non-rebreather until abg results are ready. 90 % on non-rebreather. Lungs clear in the upper bases, diminished in the lower bases.  Electronically signed by Bib Zapata RN on 5/8/2019 at 9:42 PM

## 2019-05-09 NOTE — PROGRESS NOTES
Renal Adjustment Per Protocol:    Recent Labs     05/09/19  0507   CREATININE 1.17*    CrCl cannot be calculated (Unknown ideal weight.). CrCl manually calculated = 31.3 ml/min (using TBW)    Renal function improving, now at 1.17.      Vanco TR = <4    Will increase vanco to 750 mg Q24h and obtain new TR before 3rd dose on 5/11 @ 0830    Lilliam Thornton, PharmD  Staff Pharmacist  5/9/2019 2:10 PM

## 2019-05-09 NOTE — CONSULTS
Inpatient consult to IR  Consult performed by: Samantha Mathew MD  Consult ordered by: MITCH Sandoval CNP  Reason for consult: Pleural effusions and ascites          HPI: Patient is a pleasant 66-year-old woman admitted with shortness of breath, chest pain, and fatigue. Patient has a history of breast cancer. Imaging demonstrated massive ascites and bilateral pleural effusions. A paracentesis was performed in the intensive care unit by Dr. Bryan Ortega, which was purulent. Patient felt significantly better after the paracentesis. Patient remains with bilateral pleural effusions which will need thoracentesis. Patient is resting comfortably in bed, denies any shortness of breath or discomfort at the moment. She is accompanied by family.        Family History   Problem Relation Age of Onset    No Known Problems Mother     Heart Attack Father     Heart Disease Neg Hx     Stroke Neg Hx     Cancer Neg Hx     COPD Neg Hx     Diabetes Neg Hx        Past Surgical History:   Procedure Laterality Date    MASTECTOMY      bilateral, first one in 1958, 2nd one few years ago        Past Medical History:   Diagnosis Date    [facility]     breast, radiation and mastectomy    Hyperlipidemia     Hypertension        Social History     Socioeconomic History    Marital status:      Spouse name: None    Number of children: None    Years of education: None    Highest education level: None   Occupational History    None   Social Needs    Financial resource strain: None    Food insecurity:     Worry: None     Inability: None    Transportation needs:     Medical: None     Non-medical: None   Tobacco Use    Smoking status: Never Smoker    Smokeless tobacco: Never Used   Substance and Sexual Activity    Alcohol use: Not Currently    Drug use: Never    Sexual activity: None   Lifestyle    Physical activity:     Days per week: None     Minutes per session: None    Stress: None   Relationships    Social ANDPLAN:      ASSESSMENT: Patient with ascites and bilateral pleural effusions of unclear etiology, though with history of breast cancer, malignant effusion is not excluded    PLAN: We'll perform thoracentesis, if fluid is thick or appears infected, then may place drain or chest tube.     Елена Crocker MD, [facility]

## 2019-05-09 NOTE — FLOWSHEET NOTE
Pt was on 3L NC and began to sat 87%. NC increased to 6 L with no change. Obtained venti mask and placed on 50% at 12L. Pt 02 up to 89% with RR of 26. Pt stated that the last breathing tx she received helped her breathing. Duoneb order received. Resp called and is en route. Pt calm and relaxed with eyes closed in bed. Family members at bedside.  Electronically signed by Mackenzie Schilling RN on 5/8/2019 at 9:27 PM

## 2019-05-09 NOTE — FLOWSHEET NOTE
Pt back from specials and xray. Pt tolerated well.  790ml was drained during thoracentesis. Pt remains on 100%NRB. Lungs remain Rhonchorus and pt remains SOB with any exertion. Will continue to monitor.

## 2019-05-09 NOTE — PROGRESS NOTES
[facility] Occupational Therapy      Date: 2019  Patient Name: Rossi Mccabe        MRN: [id number]  Account: [de-identified]   : 1940  (66 y.o.)  Room: [place] [address]    Chart reviewed, attempted OT at 1000 for evak. Patient not seen 2° to:    [] Hold per nsg request    [] Pt declined     [x] Pt. going off floor for test/procedure as therapist entered the floor. RN accompanying pt. Other RNs in rounding with physician and unavailable to notify. Will attempt again when able.     Electronically signed by SARA Mathur on 2019 at 10:00 AM

## 2019-05-09 NOTE — FLOWSHEET NOTE
Dr. Mariola Andres at spoke with daughter Nato Melgoza at length about Bronchoscopy and plan of care.

## 2019-05-09 NOTE — PROGRESS NOTES
Physical Therapy   Facility/Department: [facility] MED SURG IC10/IC10-01    NAME: Ronda Dixon    : 1940 (66 y.o.)  MRN: [id number]    Account: [de-identified]  Gender: female    PT evaluation and treatment orders received. Chart reviewed. PT eval attempted. Patient Unavailable: being transported off unit @ 9:50 upon PT arrival.  Hold per RN, pt on non-rebreather @ 13:30    Will attempt PT evaluation again at earliest convenience.       Electronically signed by Kristin Maharaj PT on 19 at 9:49 AM

## 2019-05-09 NOTE — FLOWSHEET NOTE
PM assessment completed. VSS. Patient denies complaints of pain. Levophed infusing at 2mcg/min. 1.9 cc/hr. Patient resting comfortably in bed. Tolerating full face bipap. Family at bedside. Patient voices no needs at this time.

## 2019-05-09 NOTE — PROGRESS NOTES
Physician Progress Note    2019   12:58 PM    Name:  Ana Craig  MRN:    [id number]     IP Day: 2     Admit Date: 2019 11:47 PM  PCP: Mariah Chang MD    Code Status:  Full Code    Subjective: On NRB, appears weak and mildly sob. No cp, n/v.     Physical Examination:      Vitals:  BP (!) 99/57   Pulse 93   Temp 98 °F (36.7 °C) (Axillary)   Resp 22   Ht 4' 10\" (1.473 m)   Wt 110 lb 3.7 oz (50 kg)   SpO2 100%   BMI 23.04 kg/m²   Temp (24hrs), Av.9 °F (36.6 °C), Min:97.7 °F (36.5 °C), Max:98 °F (36.7 °C)      General appearance: alert, cooperative and no distress.  Very thin    Mental Status: oriented to person, place and time and normal affect  Lungs: clear to auscultation bilaterally, normal effort  Heart: regular rate and rhythm, no murmur  Abdomen: soft, nontender, mild distension, no significant TTP, bowel sounds present, no masses  Extremities: no edema, redness, tenderness in the calves  Skin: no gross lesions, rashes    Data:     Labs:  Recent Labs     19  0457 19  2148 19  0454   WBC 23.8*  --  14.0*   HGB 11.4* 10.3* 10.7*     --  124*     Recent Labs     19  0457 19  0507   * 133*   K 3.7 3.2*   CL 86* 87*   CO2 27 33*   BUN 46* 30*   CREATININE 1.52* 1.17*   GLUCOSE 129* 105*     Recent Labs     19  0457 19  0507   AST 25 20   ALT 15 11   BILITOT 0.4 0.9*   ALKPHOS 43 36*       Current Facility-Administered Medications   Medication Dose Route Frequency Provider Last Rate Last Dose    vancomycin (VANCOCIN) 750 mg in dextrose 5 % 250 mL IVPB  750 mg Intravenous Q24H Brenda Lagos MD   Stopped at 19 1030    sodium chloride flush 0.9 % injection 10 mL  10 mL Intravenous 2 times per day Jamas Ahumada, APRN - CNP   10 mL at 19 0928    sodium chloride flush 0.9 % injection 10 mL  10 mL Intravenous PRN Jamas Ahumada, APRN - CNP        magnesium hydroxide (MILK OF MAGNESIA) 400 MG/5ML suspension 30 mL  30 mL Oral Daily PRN Leonilaold Plater, APRN - CNP        ondansetron [facility]) injection 4 mg  4 mg Intravenous Q6H PRN Arnold Plater, APRN - CNP   4 mg at 05/08/19 0324    acetaminophen (TYLENOL) tablet 650 mg  650 mg Oral Q4H PRN Arnold Plater, APRN - CNP        piperacillin-tazobactam (ZOSYN) 3.375 g in dextrose 5 % 50 mL IVPB extended infusion (mini-bag)  3.375 g Intravenous Q8H Tony Plater, APRN - CNP 12.5 mL/hr at 05/09/19 1125 3.375 g at 05/09/19 1125    vancomycin (VANCOCIN) intermittent dosing (placeholder)   Other RX Placeholder Leonilaold Plater, APRN - CNP        calcium elemental (OSCAL) tablet 500 mg  500 mg Oral Daily Arnold Plater, APRN - CNP   Stopped at 05/08/19 1300    vitamin D (CHOLECALCIFEROL) tablet 1,000 Units  1,000 Units Oral Daily Leonilaold Plater, APRN - CNP   Stopped at 05/08/19 1300    potassium chloride (KLOR-CON M) extended release tablet 20 mEq  20 mEq Oral BID Arnold Plater, APRN - CNP   Stopped at 05/08/19 1300    simvastatin (ZOCOR) tablet 5 mg  5 mg Oral Nightly Tony Plater, APRN - CNP   5 mg at 05/08/19 2036    aspirin chewable tablet 81 mg  81 mg Oral Daily Jatin Carlin, DO        heparin (porcine) injection 5,000 Units  5,000 Units Subcutaneous 3 times per day Gallo Correa, DO        pantoprazole (PROTONIX) injection 40 mg  40 mg Intravenous BID Jeremy Skinner MD   40 mg at 05/09/19 1125    And    sodium chloride (PF) 0.9 % injection 10 mL  10 mL Intravenous BID Jeremy Skinner MD   10 mL at 05/09/19 1126    lidocaine 2 % injection 5 mL  5 mL Intradermal PRN MITCH Amaya CNP        ipratropium-albuterol (DUONEB) nebulizer solution 1 ampule  1 ampule Inhalation Q4H WA Lula Aguayo MD   1 ampule at 05/09/19 0358    norepinephrine (LEVOPHED) 16 mg in dextrose 5 % 250 mL infusion  2 mcg/min Intravenous Continuous Lula Aguayo MD 2.3 mL/hr at 05/09/19 1100 2.5 mcg/min at 05/09/19 1100     Assessment and Plan:          Acute Hospital issues:    # shock POA-

## 2019-05-09 NOTE — FLOWSHEET NOTE
Patient resting comfortably. Levophed has been gradually increased for blood pressure and MAP control (see MAR).  Electronically signed by Spike Christensen RN on 5/9/2019 at 6:12 AM

## 2019-05-10 NOTE — PROGRESS NOTES
Pulmonary & Critical Care Medicine ICU Progress Note  Chief complaint : shock     Subjunctive/24 hour events :   Patient seen and examined during multidisciplinary rounds with RN, charge nurse, RT, pharmacy, dietitian, and social service. Patient is more awake and interactive today, she still on Levophed 0.04 juliet per kilo per minute, urine output is good, no fever, no chest pain, no abdominal pain, no nausea or vomiting. She underwent bronchoscopy yesterday with clearance of mucous plugging. She is more interactive and able to eat but she still on pressors, urine output is okay, blood sugar is controlled, she is on DVT prophylaxis      Social History     Tobacco Use    Smoking status: Never Smoker    Smokeless tobacco: Never Used   Substance Use Topics    Alcohol use: Not Currently         Problem Relation Age of Onset    No Known Problems Mother     Heart Attack Father     Heart Disease Neg Hx     Stroke Neg Hx     Cancer Neg Hx     COPD Neg Hx     Diabetes Neg Hx        Recent Labs     05/08/19  2223   PHART 7.456*   [id number] 50*   PO2ART 62*       MV Settings:     / / /FiO2 : 40 %           IV:   norepinephrine Stopped (05/10/19 1100)       Vitals:  /67   Pulse 107   Temp 98.6 °F (37 °C) (Temporal)   Resp (!) 35   Ht 4' 10\" (1.473 m)   Wt 110 lb 3.7 oz (50 kg)   SpO2 95%   BMI 23.04 kg/m²    Tmax:        Intake/Output Summary (Last 24 hours) at 5/10/2019 1354  Last data filed at 5/10/2019 0517  Gross per 24 hour   Intake 420 ml   Output 1050 ml   Net -630 ml       EXAM:  General: More awake today, answers questions and interact well, significantly cachectic  Head: normocephalic, atraumatic  Eyes:No gross abnormalities. , PERRL and Sclera nonicteric  ENT:  MMM no lesions  Neck:  supple, no masses and R IJ TLC   Chest : Few rales at the bases bilaterally, no wheezing, poor air movement, nontender, tympanic  Heart[de-identified] Heart sounds are normal.  Regular rate and rhythm without murmur, gallop or rub. ABD:  symmetric, soft, non-tender, less distended  Musculoskeletal : no cyanosis, no clubbing and no edema  Neuro:  Grossly normal  Skin: No rashes or nodules noted.   Lymph node:  no cervical nodes  Urology: Yes Hernandez   Psychiatric: appropriate    Medications:  Scheduled Meds:   docusate sodium  100 mg Oral BID    polyethylene glycol  17 g Oral Daily    magnesium sulfate  2 g Intravenous Once    vancomycin  750 mg Intravenous Q24H    sodium chloride flush  10 mL Intravenous 2 times per day    piperacillin-tazobactam  3.375 g Intravenous Q8H    vancomycin (VANCOCIN) intermittent dosing (placeholder)   Other RX Placeholder    calcium elemental  500 mg Oral Daily    vitamin D  1,000 Units Oral Daily    potassium chloride  20 mEq Oral BID    simvastatin  5 mg Oral Nightly    aspirin  81 mg Oral Daily    heparin (porcine)  5,000 Units Subcutaneous 3 times per day    pantoprazole  40 mg Intravenous BID    And    sodium chloride (PF)  10 mL Intravenous BID    ipratropium-albuterol  1 ampule Inhalation Q4H WA       PRN Meds:  sodium chloride flush, magnesium hydroxide, ondansetron, acetaminophen, lidocaine    Results: reviewed by me   CBC:   Recent Labs     05/08/19 0457 05/08/19 2148 05/09/19  0454 05/10/19  0516   WBC 23.8*  --  14.0* 9.4   HGB 11.4* 10.3* 10.7* 10.9*   HCT 34.2* 30.6* 33.0* 32.5*   MCV 80.3*  --  80.7* 80.8*     --  124* 118*     BMP:   Recent Labs     05/08/19 0457 05/09/19  0507 05/10/19  0522   * 133* 137   K 3.7 3.2* 3.2*   CL 86* 87* 90*   CO2 27 33* 36*   BUN 46* 30* 20   CREATININE 1.52* 1.17* 0.66     LIVER PROFILE:   Recent Labs     05/07/19 2000 05/08/19 0457 05/09/19  0507 05/09/19  0807 05/10/19  0522   AST  --  25 20  --  20   ALT  --  15 11  --  10   LIPASE 135*  --   --  59  --    BILITOT  --  0.4 0.9*  --  1.0*   ALKPHOS  --  43 36*  --  35*     PT/INR:   Recent Labs     05/07/19 2000 05/08/19  1128   PROTIME 10.9 11.2   INR 1.1 1.1     APTT: No

## 2019-05-10 NOTE — PLAN OF CARE
Nutrition Problem: Inadequate oral intake  Intervention: Food and/or Nutrient Delivery: Start oral diet, Start ONS(Dental Soft diet as tolerated.  Trial Clear ONS @ B, High Calorie ONS @ L, Frozen ONS @ D)  Nutritional Goals: po diet advance and toleracne with intake > 50% , anticipate weight loss as edema/resolves

## 2019-05-10 NOTE — CONSULTS
alcohol or drug  abuse. She is . Family history is reviewed and noncontributory. MEDICATIONS:  Her current medications include Tylenol; aspirin; Os-Davis;  Colace; subcutaneous heparin 5000 units three times a day; DuoNeb every  four hours while awake, 1 ampule; milk of magnesia p.r.n.; Levophed  drip; Zofran 4 mg every six hours p.r.n. nausea; Protonix 40 mg twice a  day; Zosyn 3.375 mg every eight hours; GlycoLax 17 gm; Klor-Con tablets,  20 mEq twice a day; Zocor 5 mg nightly; vancomycin 750 mg every 24  hours; and vitamin D 1000 units daily. She has no known allergies. REVIEW OF SYSTEMS:  Her 14 systems were reviewed and negative other than  the history of present illness. PHYSICAL EXAMINATION:  VITAL SIGNS:  The patient's blood pressure was 95/51, on Levophed, pulse  was 91, respirations 24, and she was afebrile. GENERAL:  She appears this is an ill-appearing Cachectic female who is  in acute distress. HEAD, EYES, EARS, NOSE AND THROAT:  Normocephalic. No scleral icterus. Mucous membranes are dry. Pupils equal, round and reactive to light and  accommodation. HEART:  Regular sinus rhythm. There is no murmurs or gallops. LUNGS:  She has decreased breath sounds on both sides and no respiratory  distress at this time. ABDOMEN:  Soft. It is distended. It is nontender. No hepato or  splenomegaly noted. No hernias noted. Bowel sounds are normal.  EXTREMITIES:  There is no clubbing, cyanosis, or edema of her lower  extremities at this time and pulses are palpable in both feet. SKIN:  There is no skin lesion. There is no bruising and there is no  rashes noted. PSYCH:  Mood and affect is normal.  Judgment appears to be normal at  this time. LABORATORY DATA:  Her white count was 14,000 with hemoglobin 10.7. Electrolytes were abnormal with a creatinine of 1.17, sodium 133,  potassium 3.2, total bilirubin is 0.9. Lipase was 135 several days ago.   Results of cytology on thoracentesis and paracentesis are pending. IMPRESSION:  Ascites of unclear etiology. There is no evidence of  peritonitis or an acute abdomen. At this time, there is no suggestion  of intraabdominal neoplastic process on CAT scan. We will await the  results of the cytology. PLAN:  We will follow with you and give further recommendations as  needed.       Aroldo Gusman MD    D: 05/10/2019 11:26:31       T: 05/10/2019 11:36:26     [email or web address]  Job#: [id number]     Doc#: [id number]    CC:

## 2019-05-10 NOTE — PROGRESS NOTES
Pt Name: Lizzy De Dios Record Number: [id number]  Date of Birth 1940   Admit date 5/7/2019 11:47 PM  Today's Date: 5/10/2019     ASSESSMENT  1. Hospital day # 3  2  Ascites unclear etiology  3. Personal history of breast cancer    PLAN  1. Await cytology      SUBJECTIVE  Chief complaint: none  Afebrile, vital signs are stable. She denies any nausea or vomiting, has not passed flatus or had a bowel movement. She is tolerating a Diet NPO Effective Now. Her pain is well controlled on current medications. Cytology pending      has a past medical history of Cancer ([facility] [address] Hyperlipidemia, and Hypertension. CURRENT MEDS  Scheduled Meds:   docusate sodium  100 mg Oral BID    polyethylene glycol  17 g Oral Daily    magnesium sulfate  2 g Intravenous Once    vancomycin  750 mg Intravenous Q24H    sodium chloride flush  10 mL Intravenous 2 times per day    piperacillin-tazobactam  3.375 g Intravenous Q8H    vancomycin (VANCOCIN) intermittent dosing (placeholder)   Other RX Placeholder    calcium elemental  500 mg Oral Daily    vitamin D  1,000 Units Oral Daily    potassium chloride  20 mEq Oral BID    simvastatin  5 mg Oral Nightly    aspirin  81 mg Oral Daily    heparin (porcine)  5,000 Units Subcutaneous 3 times per day    pantoprazole  40 mg Intravenous BID    And    sodium chloride (PF)  10 mL Intravenous BID    ipratropium-albuterol  1 ampule Inhalation Q4H WA     Continuous Infusions:   norepinephrine 2 mcg/min (05/09/19 1847)     PRN Meds:.sodium chloride flush, magnesium hydroxide, ondansetron, acetaminophen, lidocaine    OBJECTIVE  CURRENT VITALS:  height is 4' 10\" (1.473 m) and weight is 110 lb 3.7 oz (50 kg). Her temperature is 98.5 °F (36.9 °C). Her blood pressure is 115/62 and her pulse is 91. Her respiration is 18 and oxygen saturation is 100%.        GENERAL: alert, no distress  ABDOMEN: soft, non-tender, distended, bowel sounds present in all 4 quadrants and no guarding or peritoneal signs    In: 420 [I.V.:420]  Out: 1050 [Urine:1050]  Date 05/10/19 0000 - 05/10/19 2359   Shift 0932-9148 5580-3318 6326-9681 24 Hour Total   INTAKE   Shift Total(mL/kg)       OUTPUT   Urine(mL/kg/hr) 450(1.1)   450   Shift Total(mL/kg) 450(9)   450(9)   Weight (kg) 50 50 50 50       LABS  Recent Labs     05/08/19 0457 05/08/19 2148 05/09/19 0454 05/09/19  0507 05/10/19  0516 05/10/19  0522   WBC 23.8*  --  14.0*  --  9.4  --    HGB 11.4* 10.3* 10.7*  --  10.9*  --    HCT 34.2* 30.6* 33.0*  --  32.5*  --      --  124*  --  118*  --    *  --   --  133*  --  137   K 3.7  --   --  3.2*  --  3.2*   CL 86*  --   --  87*  --  90*   CO2 27  --   --  33*  --  36*   BUN 46*  --   --  30*  --  20   CREATININE 1.52*  --   --  1.17*  --  0.66   MG 2.1  --   --  1.7  --  1.8   CALCIUM 7.6*  --   --  7.8*  --  7.8*      Recent Labs     05/07/19 2000 05/08/19  1128   INR 1.1 1.1     Recent Labs     05/07/19 2000 05/07/19 2011 05/08/19 0457 05/09/19 0507 05/09/19  0807 05/10/19  0522   AST  --   --  25 20  --  20   ALT  --   --  15 11  --  10   BILITOT  --   --  0.4 0.9*  --  1.0*   LIPASE 135*  --   --   --  59  --    LACTA  --  1.6  --   --   --   --        RADIOLOGY  Ct Abdomen Pelvis Wo Contrast Additional Contrast? None    Result Date: 5/8/2019  COMPARISON: None available. HISTORY:  ABDOMINAL PAIN COMMENTS:  WEAKNESS, POSSIBLE DEHYDRATION TECHNIQUE: procedure Thin slice spiral CT was performed from the lung bases through the iliac crests without contrast administration. Contrast enhancement was not employed due to clinician's order. Sagittal and coronal reconstructions were also performed. FINDINGS: Images through the lung bases demonstrate a large pleural effusion on the right with consolidation that contains air bronchograms. Smaller pleural effusion is seen on the left and there is also consolidation. The left effusion may also be loculated.  Air bronchograms are also seen in the right middle lobe. The heart is enlarged. A hiatal hernia is seen. A very large amount of ascites is seen. Non-contrast images of the liver,  pancreas, spleen and adrenals are unremarkable. Extrarenal pelvises are seen in both kidneys. The ureters appear distended. No  dilated loops of bowel or free air is seen. Diverticulosis coli is seen in the sigmoid colon without evidence for diverticulitis. The endometrial cavity of the uterus appears prominent. There are diffuse atherosclerotic calcifications. Mild compression deformity is seen at L4. No destructive bony lesions are seen. 1. Bilateral pleural effusions with consolidation. The right pleural effusion is larger than left and the left pleural effusion may be loculated 2. A very large volume of ascites. 3. The collecting systems of both kidneys are mildly prominent. No obstructing calcifications are nephrolithiasis seen. 4. Mild compression deformity is seen at L4 that is age indeterminate. All CT scans at this facility use dose modulation, iterative reconstruction, and/or weight based dosing when appropriate to reduce radiation dose to as low as reasonably achievable. Xr Chest Standard (2 Vw)    Addendum Date: 5/9/2019    ADDENDUM: This addendum was created on 5/9/2019 1:58 PM by Naima Ramirez M.D. No evidence of pneumothorax in the right chest, site of thoracentesis. Interval resolution of the previously seen right-sided pleural effusion, consistent with thoracentesis. FINDINGS: No evidence of pneumothorax in the right chest. Interval resolution of the right-sided pleural effusion consistent with thoracentesis today. Xr Chest Standard (2 Vw)    Result Date: 4/27/2019  EXAMINATION: XR CHEST (2 VW) CLINICAL HISTORY: COUGH COMPARISONS: None available. FINDINGS: Osteopenia. Thoracic kyphosis. Cardiopericardial silhouette normal. Aorta calcified and tortuous.  Bilateral blunting costophrenic angles left greater than right, with ill-defined area of increased opacity bilateral lung bases, right greater than left. BILATERAL PLEURAL EFFUSIONS WITH BIBASILAR ATELECTASIS/PNEUMONIA. Ct Chest Wo Contrast    1. There is a large left pleural effusion with complete collapse of the left lung. The left mainstem bronchus is occluded as well. This may be secondary to external compression from the fluid versus internal mucous plug. 2.  There is a small right pleural effusion. Opacification of the right lung base may represent pneumonia versus compressive atelectasis. COMPARISON: CT of the abdomen dating May 7, 2019 DIAGNOSIS: Left lung collapse. COMMENTS: E86.1 Hypovolemia ICD10 TECHNIQUE:   Spiral scanning of the chest was performed without contrast as per the referring physician. CT Dose-Length Product (estimate related to radiation exposure from this exam):  161.69  mGy*cm. FINDINGS: There is a large left pleural effusion with complete collapse of the left lung. There is obstruction of the left mainstem bronchus which may be secondary to extrinsic compression from the effusion versus internal obstruction such as a mucous plug. There is a small right pleural effusion. There is right basilar opacification which may represent pneumonia versus compressive atelectasis. There is atherosclerotic calcification of the aorta and its branches. Visualized osseous structures are unremarkable. Evaluation of the mediastinum is limited secondary to lack of IV contrast and collapse of the left lung. Lymphadenopathy cannot be well evaluated for. Visualized upper abdomen demonstrates bilateral dilatation of the renal collecting systems as seen on prior study. Xr Chest Portable    Result Date: 5/9/2019  EXAMINATION: XR CHEST, PORTABLE SINGLE VIEW: DATE AND TIME: 5/9/2019 at 12:00 AM. CLINICAL HISTORY: SHORTNESS OF BREATH. POST RIGHT THORACENTESIS. DR. Jose Ha TO READ. COMPARISONS: May 8, 2019. FINDINGS: CVP line unchanged in position.  Large bilateral effusions with hilar haze and vascular congestion consistent with CHF. No change. FLORID PULMONARY EDEMA WITH LARGE EFFUSIONS. NO CHANGE. Xr Chest Portable    Result Date: 5/8/2019  EXAMINATION: XR CHEST PORTABLE CLINICAL HISTORY: CENTRAL LINE COMPARISONS: MAY 7, 2019, APRIL 27, 2019 FINDINGS: Right jugular vein central line with tip in right atrium. Kyphotic. Osteopenic. Blunting costophrenic angles bilaterally with increased opacities mid and lower lungs bilaterally. Pulmonary vasculature prominent, and indistinct. BILATERAL PLEURAL EFFUSIONS. BILATERAL EDEMA VERSUS ATELECTASIS/PNEUMONIA. Xr Chest Portable    Result Date: 5/8/2019  COMPARISON: None available. HISTORY:  ABDOMINAL PAIN COMMENTS:  WEAKNESS, POSSIBLE DEHYDRATION TECHNIQUE: procedure Thin slice spiral CT was performed from the lung bases through the iliac crests without contrast administration. Contrast enhancement was not employed due to clinician's order. Sagittal and coronal reconstructions were also performed. FINDINGS: Images through the lung bases demonstrate a large pleural effusion on the right with consolidation that contains air bronchograms. Smaller pleural effusion is seen on the left and there is also consolidation. The left effusion may also be loculated. Air bronchograms are also seen in the right middle lobe. The heart is enlarged. A hiatal hernia is seen. A very large amount of ascites is seen. Non-contrast images of the liver,  pancreas, spleen and adrenals are unremarkable. Extrarenal pelvises are seen in both kidneys. The ureters appear distended. No  dilated loops of bowel or free air is seen. Diverticulosis coli is seen in the sigmoid colon without evidence for diverticulitis. The endometrial cavity of the uterus appears prominent. There are diffuse atherosclerotic calcifications. Mild compression deformity is seen at L4. No destructive bony lesions are seen. 1. Bilateral pleural effusions with consolidation.  The right pleural effusion is larger than left and the left pleural effusion may be loculated 2. A very large volume of ascites. 3. The collecting systems of both kidneys are mildly prominent. No obstructing calcifications are nephrolithiasis seen. 4. Mild compression deformity is seen at L4 that is age indeterminate. All CT scans at this facility use dose modulation, iterative reconstruction, and/or weight based dosing when appropriate to reduce radiation dose to as low as reasonably achievable. Us Abdomen Limited    Result Date: 5/8/2019  EXAMINATION: US ABDOMEN LIMITED, US DUP ABD PEL RETRO SCROT COMPLETE CLINICAL HISTORY: 57-year-old with abdominal distention and new onset ascites COMPARISONS: Unenhanced CT abdomen 5/7/2019 FINDINGS: Right upper quadrant ultrasound as well as liver Doppler ultrasound were performed by a registered technologist and images submitted for evaluation. Visualized liver is normal in echogenicity without significant lobulated borders. No ultrasound  signs of intrahepatic biliary ductal dilatation or hepatic mass. Liver vascular Doppler demonstrates a mildly enlarged portal vein, measuring 1.4 cm in transverse dimension. However spectral analysis demonstrates normal flow direction into the liver in the main portal vein, right and left portal veins and a posterior branch of the right portal vein with normal phasicity of the flow. Hepatic artery flow directionality is also normal into the liver. There is a normal appearing low resistive arterial waveform  Resistive index of the hepatic artery was not obtained on this examination. Right, mid and lower left hepatic veins demonstrate normal direction of flow into the IVC with pulsatile waveforms predominantly below baseline. Common duct in the cade hepatis is normal in caliber measuring 2 to 3 mm. Gallbladder demonstrates multiple small foci of gallbladder wall thickening suspicious for tiny polyps. No diffuse gallbladder wall thickening or mural edema. No large gallstones. Note is made of upper abdominal ascites as well as a right pleural effusion. Pancreas is mostly obscured and not well imaged on this study. UPPER ABDOMINAL ASCITES AND RIGHT PLEURAL EFFUSION. HEPATIC ECHOTEXTURE IS WITHIN NORMAL LIMITS WITH NORMAL DIRECTION OF FLOW ON LIVER VASCULAR ULTRASOUND. PROMINENT MAIN PORTAL VEIN. SMALL FOCI OF GALLBLADDER WALL THICKENING SUSPICIOUS FOR A POLYPS. OTHERWISE NEGATIVE BILIARY ULTRASOUND. NONCONTRIBUTORY PANCREATIC EVALUATION    Us Dup Abd Pel Retro Scrot Complete    Result Date: 5/8/2019  EXAMINATION: US ABDOMEN LIMITED, US DUP ABD PEL RETRO SCROT COMPLETE CLINICAL HISTORY: 77-year-old with abdominal distention and new onset ascites COMPARISONS: Unenhanced CT abdomen 5/7/2019 FINDINGS: Right upper quadrant ultrasound as well as liver Doppler ultrasound were performed by a registered technologist and images submitted for evaluation. Visualized liver is normal in echogenicity without significant lobulated borders. No ultrasound  signs of intrahepatic biliary ductal dilatation or hepatic mass. Liver vascular Doppler demonstrates a mildly enlarged portal vein, measuring 1.4 cm in transverse dimension. However spectral analysis demonstrates normal flow direction into the liver in the main portal vein, right and left portal veins and a posterior branch of the right portal vein with normal phasicity of the flow. Hepatic artery flow directionality is also normal into the liver. There is a normal appearing low resistive arterial waveform  Resistive index of the hepatic artery was not obtained on this examination. Right, mid and lower left hepatic veins demonstrate normal direction of flow into the IVC with pulsatile waveforms predominantly below baseline. Common duct in the cade hepatis is normal in caliber measuring 2 to 3 mm. Gallbladder demonstrates multiple small foci of gallbladder wall thickening suspicious for tiny polyps.  No diffuse gallbladder wall thickening or mural edema. No large gallstones. Note is made of upper abdominal ascites as well as a right pleural effusion. Pancreas is mostly obscured and not well imaged on this study. UPPER ABDOMINAL ASCITES AND RIGHT PLEURAL EFFUSION. HEPATIC ECHOTEXTURE IS WITHIN NORMAL LIMITS WITH NORMAL DIRECTION OF FLOW ON LIVER VASCULAR ULTRASOUND. PROMINENT MAIN PORTAL VEIN. SMALL FOCI OF GALLBLADDER WALL THICKENING SUSPICIOUS FOR A POLYPS. OTHERWISE NEGATIVE BILIARY ULTRASOUND. NONCONTRIBUTORY PANCREATIC EVALUATION    Us Dup Lower Extremities Bilateral Venous    Result Date: 5/8/2019  EXAMINATION: US DUP LOWER EXTREMITIES BILATERAL VENOUS CLINICAL HISTORY: 68-year-old with bilateral lower extremity swelling COMPARISONS: None available. FINDINGS: Duplex and color Doppler ultrasounds were performed of the bilateral lower extremity deep venous systems. Visualized portions of both common femoral veins, femoral veins, and popliteal veins demonstrate satisfactory compression, color flow, and  augmentation. Segmental views of posterior tibial and peroneal deep calf veins also demonstrate satisfactory compression and color flow. Deep calf veins are incompletely imaged in their entirety.      NO ULTRASOUND SIGNS OF THROMBUS IN THE BILATERAL LOWER EXTREMITY DEEP VENOUS SYSTEMS AS DETAILED ABOVE    Electronically signed by Alma Orellana MD on 5/10/2019 at 11:31 AM

## 2019-05-10 NOTE — PROGRESS NOTES
Vascular and Interventional Radiology   Ina Noble. Brittney Rand      Subjective: Russ Stovall is a 66 y.o. female Status post right thoracentesis yesterday. Patient feeling much better today, she is sitting in bed comfortable, and conversing well. She is without distress or discomfort. Objective:   /67   Pulse 107   Temp 98.6 °F (37 °C) (Temporal)   Resp (!) 35   Ht 4' 10\" (1.473 m)   Wt 110 lb 3.7 oz (50 kg)   SpO2 95%   BMI 23.04 kg/m²     Physical:  thin,  alert and  not in acute distress    Normocephalic, without obvious abnormality, atraumatic    Neck supple. No adenopathy. Thyroid symmetric, normal size, and without nodularity    normal rate, regular rhythm, no murmurs, no gallops, intact distal pulses and no carotid bruits    air entry reduced RENNY and LLL. Right lung CTA    Normal, without deformities, edema, or skin discoloration    Lab:  Recent Labs     05/10/19  0516   WBC 9.4   RBC 4.02*   HGB 10.9*   HCT 32.5*   MCV 80.8*   MCH 27.0   MCHC 33.5   RDW 14.3   *       Recent Labs     05/07/19  2000 05/08/19  1128   INR 1.1 1.1   PROTIME 10.9 11.2       Recent Labs     05/07/19  1935 05/08/19  0457 05/09/19  0507 05/10/19  0522   CREATININE 1.82* 1.52* 1.17* 0.66         ASSESSMENT: Status post right-sided thoracentesis. Patient had mucous plug removed from the left bronchus yesterday by Bonnie Parra with bronchoscopy. I explained to the patient that we are awaiting results of the fluid from the abdomen and the chest for diagnosis so we can figure out a treatment plan for her. PLAN: Nothing at the moment from interventional radiology. Ina Noble.  Brittney Rand  5/10/2019,  2:00 PM

## 2019-05-10 NOTE — PROGRESS NOTES
Up:  OT D/C RECOMMENDATIONS  REQUIRES OT FOLLOW UP: Yes       Assessment/Discharge Disposition:     Performance deficits / Impairments: Decreased functional mobility , Decreased strength, Decreased endurance, Decreased ADL status, Decreased high-level IADLs, Decreased balance  Prognosis: Good  Discharge Recommendations: Continue to assess pending progress  History: Multiple comorbidities  Exam: 6 performance deficits  Assistance / Modification: Min-Mod A    Six Click Score   How much help for putting on and taking off regular lower body clothing?: A Lot  How much help for Bathing?: A Lot  How much help for Toileting?: A Lot  How much help for putting on and taking off regular upper body clothing?: None  How much help for taking care of personal grooming?: None  How much help for eating meals?: None  AM-[facility] Inpatient Daily Activity Raw Score: 18  AM-[facility] Inpatient ADL T-Scale Score : 38.66  ADL Inpatient CMS 0-100% Score: 46.65    Plan:  Plan  Times per week: 1-4x/week  Plan weeks: Length of acute care stay  Current Treatment Recommendations: Strengthening, Functional Mobility Training, Patient/Caregiver Education & Training, Home Management Training, Endurance Training, Equipment Evaluation, Education, & procurement, Cognitive/Perceptual Training, Neuromuscular Re-education, Safety Education & Training, Self-Care / ADL    Goals:   Patient will:    - Improve functional endurance to tolerate/complete 30 mins of ADL's  - Be Independent in UB ADLs   - Be Modified Independent in LB ADLs  - Be Modified Independent in ADL transfers without LOB  - Be Independent in toileting tasks  - Improve bilateral UE strength and endurance to 4/5 in order to participate in self-care activities as projected. - Access appropriate D/C site with as few architectural barriers as possible. - Sequence self-care tasks with no cues for safety awareness  - Other :  Improve sitting/standing balance to complete ADL tasks as projected.      Patient

## 2019-05-10 NOTE — PROGRESS NOTES
Progress Note  Patient: Anuel Melton  Unit/Bed: IC10/IC10-01  YOB: 1940  MRN: [id number]  Acct: [de-identified]   Admitting Diagnosis: Hypovolemia [E86.1]  Hypotension [I95.9]  Admit Date:  5/7/2019  Hospital Day: 3    Chief Complaint: sepsis resp failure PA HTn pna     Histories:  Past Medical History:   Diagnosis Date    Cancer [facility])     breast, radiation and mastectomy    Hyperlipidemia     Hypertension      Past Surgical History:   Procedure Laterality Date    MASTECTOMY      bilateral, first one in 1958, 2nd one few years ago    THORACENTESIS Right 05/09/2019    790 ml clear, yellow fluid removed by Dr. Juan A Camacho History   Problem Relation Age of Onset    No Known Problems Mother     Heart Attack Father     Heart Disease Neg Hx     Stroke Neg Hx     Cancer Neg Hx     COPD Neg Hx     Diabetes Neg Hx      Social History     Socioeconomic History    Marital status:      Spouse name: None    Number of children: None    Years of education: None    Highest education level: None   Occupational History    None   Social Needs    Financial resource strain: None    Food insecurity:     Worry: None     Inability: None    Transportation needs:     Medical: None     Non-medical: None   Tobacco Use    Smoking status: Never Smoker    Smokeless tobacco: Never Used   Substance and Sexual Activity    Alcohol use: Not Currently    Drug use: Never    Sexual activity: None   Lifestyle    Physical activity:     Days per week: None     Minutes per session: None    Stress: None   Relationships    Social connections:     Talks on phone: None     Gets together: None     Attends Buddhism service: None     Active member of club or organization: None     Attends meetings of clubs or organizations: None     Relationship status: None    Intimate partner violence:     Fear of current or ex partner: None     Emotionally abused: None     Physically abused: None     Forced sexual activity: 05/10/2019    HGB 10.9 05/10/2019    HCT 32.5 05/10/2019    MCV 80.8 05/10/2019    MCH 27.0 05/10/2019    MCHC 33.5 05/10/2019    RDW 14.3 05/10/2019     05/10/2019     CBC with Differential:    Lab Results   Component Value Date    WBC 9.4 05/10/2019    RBC 4.02 05/10/2019    HGB 10.9 05/10/2019    HCT 32.5 05/10/2019     05/10/2019    MCV 80.8 05/10/2019    MCH 27.0 05/10/2019    MCHC 33.5 05/10/2019    RDW 14.3 05/10/2019    LYMPHOPCT 5.4 05/10/2019    MONOPCT 13.7 05/10/2019    BASOPCT 0.2 05/10/2019    MONOSABS 1.3 05/10/2019    LYMPHSABS 0.5 05/10/2019    EOSABS 0.1 05/10/2019    BASOSABS 0.0 05/10/2019     CMP:    Lab Results   Component Value Date     05/10/2019    K 3.2 05/10/2019    CL 90 05/10/2019    CO2 36 05/10/2019    BUN 20 05/10/2019    CREATININE 0.66 05/10/2019    GFRAA >60.0 05/10/2019    LABGLOM >60.0 05/10/2019    GLUCOSE 99 05/10/2019    PROT 4.9 05/10/2019    LABALBU 2.7 05/10/2019    CALCIUM 7.8 05/10/2019    BILITOT 1.0 05/10/2019    ALKPHOS 35 05/10/2019    AST 20 05/10/2019    ALT 10 05/10/2019     BMP:    Lab Results   Component Value Date     05/10/2019    K 3.2 05/10/2019    CL 90 05/10/2019    CO2 36 05/10/2019    BUN 20 05/10/2019    LABALBU 2.7 05/10/2019    CREATININE 0.66 05/10/2019    CALCIUM 7.8 05/10/2019    GFRAA >60.0 05/10/2019    LABGLOM >60.0 05/10/2019    GLUCOSE 99 05/10/2019     Magnesium:    Lab Results   Component Value Date    MG 1.8 05/10/2019     Troponin:    Lab Results   Component Value Date    TROPONINI <0.010 05/08/2019        Active Hospital Problems    Diagnosis Date Noted    Pleural effusion, bilateral [J90]     Other ascites [R18.8]     Collapse of left lung [J98.11]     History of breast cancer [Z85.3]     Coffee ground emesis [K92.0]     Hypotension [I95.9] 05/08/2019    Elevated troponin [R74.8] 05/08/2019    Dyspnea [R06.00] 05/08/2019    Electrolyte abnormality [E87.8] 05/08/2019    Non-intractable vomiting with nausea [R11.2] 05/08/2019    Weakness [R53.1] 05/08/2019    Sepsis ([facility] [address] [A41.9]     Pneumonia [J18.9] 05/07/2019        Assessment/Plan:  1. PNA- currently on IV ATB'S  2. Hypotension- wean off levophed drip  3. Hypokalemia-Monitor potassium levels and keep greater than 4.0  4. hypomagnesium-Monitor magnesium levels and keep greater 2.0  5. Pulmonary HTN  6. EF Normal  7.  Cardiology will continue to follow along             Electronically signed by MITCH Miles CNP on 5/10/2019 at 10:35 AM

## 2019-05-10 NOTE — PROGRESS NOTES
Vascular and Interventional Radiology   Geroge Bulla. Shira Ken      Subjective: Ronda Dixon is a 66 y.o. female patient with bilateral pleural effusions and previous ascites, and paracentesis. She feels much better since the paracentesis, still has mild shortness of breath. She is on oxygen mask currently, laying in bed though comfortable. Answering questions. She understands the procedure that we are about to do which is thoracentesis on the right thoracic cavity to remove the fluid from around the lung including the risks, benefits, and alternatives and wishes to proceed. Objective:   /62   Pulse 91   Temp 98.5 °F (36.9 °C)   Resp 18   Ht 4' 10\" (1.473 m)   Wt 110 lb 3.7 oz (50 kg)   SpO2 100%   BMI 23.04 kg/m²     Physical:  thin,  alert and in mild distress    Normocephalic, without obvious abnormality, atraumatic    Neck supple. No adenopathy. Thyroid symmetric, normal size, and without nodularity    normal rate, regular rhythm, no murmurs and no gallops    air entry reduced diffusely throughout both lungs        Lab:  Recent Labs     05/10/19  0516   WBC 9.4   RBC 4.02*   HGB 10.9*   HCT 32.5*   MCV 80.8*   MCH 27.0   MCHC 33.5   RDW 14.3   *       Recent Labs     05/07/19  2000 05/08/19  1128   INR 1.1 1.1   PROTIME 10.9 11.2       Recent Labs     05/07/19  1935 05/08/19  0457 05/09/19  0507 05/10/19  0522   CREATININE 1.82* 1.52* 1.17* 0.66         ASSESSMENT: Patient with previous ascites and bilateral pleural effusions, status post PARACENTESIS    PLAN: Ultrasound-guided right-sided thoracentesis      Geroge Bulla.  Shira Ken  5/10/2019,  10:00 AM

## 2019-05-10 NOTE — PROGRESS NOTES
Nutrition Assessment    Type and Reason for Visit: Reassess    Nutrition Recommendations: S  ental Soft diet as tolerated. Trial Clear ONS @ B, High Calorie ONS @ L, Frozen ONS @ D    Nutrition Assessment: Compromised nutritonal status continues, related to decreased appetite, previous NPS status with reports of poor intake and weight loss PTA    Malnutrition Assessment:  · Malnutrition Status: Insufficient data  · Context: Acute illness or injury  · Findings of the 6 clinical characteristics of malnutrition (Minimum of 2 out of 6 clinical characteristics is required to make the diagnosis of moderate or severe Protein Calorie Malnutrition based on AND/ASPEN Guidelines):  1. Energy Intake-Unable to assess, Unable to assess    2. Weight Loss-5% loss or greater, in 1 month  3. Fat Loss-Unable to assess,    4. Muscle Loss-Unable to assess,    5. Fluid Accumulation-Moderate to severe fluid accumulation, Ascites  6.   Strength-Not measured    Nutrition Risk Level: High    Nutrient Needs:  · Estimated Daily Total Kcal: 2841-6187 (kg x 28-30)(? risk for refeeding)  · Estimated Daily Protein (g): 61-73 (kg x 1.5-1.8)  · Estimated Daily Total Fluid (ml/day): 0377-4358 (1 ml/kcal)    Nutrition Diagnosis:   · Problem: Inadequate oral intake  · Etiology: related to Alteration in GI function     Signs and symptoms:  as evidenced by Diet history of poor intake    Objective Information:  · Nutrition-Focused Physical Findings: ascites of unknown origin per notes, cytology pending, last Bm PTA ( on colace, miralax), trace edema, being treated for collpased lung, labs noted, meds reviewd  · Wound Type: None  · Current Nutrition Therapies:  · Oral Diet Orders: Dental Soft   · Anthropometric Measures:  · Ht: 4' 10\" (147.3 cm)   · Current Body Wt: 110 lb (49.9 kg)((5/9))  · Admission Body Wt: 89 lb (40.4 kg)(? source)  · Usual Body Wt: 95 lb (43.1 kg)(4/27/19 per EMR)  · % Weight Change:  ,  previously noted 6% x 1 month, current weight increased with ascites  · Ideal Body Wt: 96 lb (43.5 kg), % Ideal Body 92%  · BMI Classification: BMI 18.5 - 24.9 Normal Weight    Nutrition Interventions:   Start oral diet, Start ONS(Dental Soft diet as tolerated.  Trial Clear ONS @ B, High Calorie ONS @ L, Frozen ONS @ D)  Continued Inpatient Monitoring, Education Not Indicated    Nutrition Evaluation:   · Evaluation: Goals set   · Goals: po diet advance and toleracne with intake > 50% , anticipate weight loss as edema/resolves    · Monitoring: Meal Intake, Supplement Intake, Diet Tolerance, Weight, Pertinent Labs      Electronically signed by Aleksandr Head RD, LD on 5/10/19 at 1:50 PM

## 2019-05-10 NOTE — FLOWSHEET NOTE
3716- assessment complete, pt alert and oriented x 4, full face bipap on patient, pt denies any pain, denies n/v, call light in reach, bed in lowest position, bed alarm engaged, will continue to monitor. 0800- dr Brian Echeverria aware of potassium level of 3.2  1033-- levophed gtt decreased to 1mcg/hr  1100- pt tolerating decreased levo gtt rate, levophed gtt stopped per Mary Mcduffie NP request  8566- down to CT scan with patient. 1525- new 16 [place] long cath placed per Dr. Perry Almeida request due to CT request.   1628- long draining blood colored urine not present on long placement, dr Rupal Machuca aware of change.

## 2019-05-10 NOTE — CARE COORDINATION
Bi-pap at night. Cytology pending awaiting to determine better treatment plan. If no MRSA in cytology to stop  Vanco.  On levo  To get up in chair today.

## 2019-05-10 NOTE — PROGRESS NOTES
Physician Progress Note    5/10/2019   1:56 PM    Name:  Alvino Geiger  MRN:    [id number]     IP Day: 3     Admit Date: 2019 11:47 PM  PCP: Venus Bee MD    Code Status:  Full Code    Subjective:      Appears to have improved resp status, no chest pain. Standing with PT on eval.     Physical Examination:      Vitals:  /67   Pulse 107   Temp 98.6 °F (37 °C) (Temporal)   Resp (!) 35   Ht 4' 10\" (1.473 m)   Wt 110 lb 3.7 oz (50 kg)   SpO2 95%   BMI 23.04 kg/m²   Temp (24hrs), Av.6 °F (37 °C), Min:98.5 °F (36.9 °C), Max:98.6 °F (37 °C)      General appearance: alert, cooperative and no distress.  Very thin    Mental Status: oriented to person, place and time and normal affect  Lungs: clear to auscultation bilaterally, normal effort  Heart: regular rate and rhythm, no murmur  Abdomen: soft, nontender, mild distension, no significant TTP, bowel sounds present, no masses  Extremities: no edema, redness, tenderness in the calves  Skin: no gross lesions, rashes    Data:     Labs:  Recent Labs     19  0454 05/10/19  0516   WBC 14.0* 9.4   HGB 10.7* 10.9*   * 118*     Recent Labs     19  0507 05/10/19  0522   * 137   K 3.2* 3.2*   CL 87* 90*   CO2 33* 36*   BUN 30* 20   CREATININE 1.17* 0.66   GLUCOSE 105* 99     Recent Labs     19  0507 05/10/19  0522   AST 20 20   ALT 11 10   BILITOT 0.9* 1.0*   ALKPHOS 36* 35*       Current Facility-Administered Medications   Medication Dose Route Frequency Provider Last Rate Last Dose    docusate sodium (COLACE) capsule 100 mg  100 mg Oral BID Beth Farris MD   100 mg at 05/10/19 1218    polyethylene glycol (GLYCOLAX) packet 17 g  17 g Oral Daily Beth Farris MD   17 g at 05/10/19 1218    magnesium sulfate 2 g in 50 mL IVPB premix  2 g Intravenous Once MITCH Black - CNP        vancomycin (VANCOCIN) 750 mg in dextrose 5 % 250 mL IVPB  750 mg Intravenous Q24H Beth Farris MD   Stopped at 05/10/19 1007    sodium chloride flush 0.9 % injection 10 mL  10 mL Intravenous 2 times per day Lian Mccabe, APRN - CNP   10 mL at 05/10/19 0919    sodium chloride flush 0.9 % injection 10 mL  10 mL Intravenous PRN Lian Mccabe, APRN - CNP        magnesium hydroxide (MILK OF MAGNESIA) 400 MG/5ML suspension 30 mL  30 mL Oral Daily PRN Lian Mccabe, APRN - CNP        ondansetron (ZOFRAN) injection 4 mg  4 mg Intravenous Q6H PRN Lian Mccabe, APRN - CNP   4 mg at 05/08/19 0324    acetaminophen (TYLENOL) tablet 650 mg  650 mg Oral Q4H PRN Lian Mccabe, APRN - CNP        piperacillin-tazobactam (ZOSYN) 3.375 g in dextrose 5 % 50 mL IVPB extended infusion (mini-bag)  3.375 g Intravenous Q8H Beverly Mahan APRN - CNP 12.5 mL/hr at 05/10/19 0907 3.375 g at 05/10/19 9569    vancomycin (VANCOCIN) intermittent dosing (placeholder)   Other RX Placeholder Lian Mccabe, APRN - CNP        calcium elemental (OSCAL) tablet 500 mg  500 mg Oral Daily Lian Mccabe, APRN - CNP   Stopped at 05/08/19 1300    vitamin D (CHOLECALCIFEROL) tablet 1,000 Units  1,000 Units Oral Daily Lian Mccabe, APRN - CNP   Stopped at 05/08/19 1300    potassium chloride (KLOR-CON M) extended release tablet 20 mEq  20 mEq Oral BID Lian Mccabe, APRN - CNP   Stopped at 05/08/19 1300    simvastatin (ZOCOR) tablet 5 mg  5 mg Oral Nightly Lian Mccabe, APRN - CNP   5 mg at 05/08/19 2036    aspirin chewable tablet 81 mg  81 mg Oral Daily [address], DO        heparin (porcine) injection 5,000 Units  5,000 Units Subcutaneous 3 times per day [address], DO   5,000 Units at 05/10/19 0522    pantoprazole (PROTONIX) injection 40 mg  40 mg Intravenous BID Sandy German MD   40 mg at 05/10/19 0919    And    sodium chloride (PF) 0.9 % injection 10 mL  10 mL Intravenous BID Sandy German MD   10 mL at 05/10/19 0919    lidocaine 2 % injection 5 mL  5 mL Intradermal PRN Jerald Duque, APRN - CNP        ipratropium-albuterol (DUONEB) nebulizer solution 1 ampule  1 ampule Inhalation Q4H [place] Benny Car MD   1 ampule at 05/10/19 0859    norepinephrine (LEVOPHED) 16 mg in dextrose 5 % 250 mL infusion  2 mcg/min Intravenous Continuous Benny Car MD   Stopped at 05/10/19 1100     Assessment and Plan:          Acute Hospital issues:    # shock POA- septic vs hypovolemic- due to PNA, poor oral intake, rule out SBP, right sided heart failure  - has massive ascites and bilateral pleural effusion. S/p paracentesis, f/u fluids   - resume vancomycin and zosyn   - resume Levophed to keep MAP>65, avoid hypotension   - getting albumin after paracentesis   5/9 - await cytology, unsure of source of fluid. Had thoracentesis today. Concern for abdominal fluid to have pmn 423, on iv abx.  5/10 - improved, await cytology. CT abd/pel shows poss uterine growth, will await cytology prior to further gyn workup if necessary. # generalized weakness   - due to above     # suspected PNA POA   - resume abx   5/10 - noted likely pna now with pleural fluid removed. Cont abx. # ascites- new onset. Due to right sided heart failure vs ?thrombosis   - rule out portal vein thrombosis, will obtain liver US with doppler   5/9 - noted severe pulm htn, liver wnl    # LE edema   - DVT scan     # concern for GI bleed- positive occult stool   - monitor H/H, PPI, hold SQ heparin and asa   - GI consult     # HTN/ HLD  - resume statin.  Hold anti-hypertensive meds      DVT/PPx- SCD for now       [address] [facility]  ICU        Electronically signed by Carmencita Andrews MD on 5/10/2019 at 1:56 PM

## 2019-05-10 NOTE — PROGRESS NOTES
Physical Therapy Med Surg Initial Assessment  Fac[facility]  Room: [place] [address]       NAME: Hao Guallpa  : 1940 (66 y.o.)  MRN: [id number]  CODE STATUS: Full Code    Date of Service: 5/10/2019    Patient Diagnosis(es): Hypovolemia [E86.1]  Hypotension [I95.9]   No chief complaint on file. Patient Active Problem List    Diagnosis Date Noted    Pleural effusion, bilateral     Other ascites     Collapse of left lung     History of breast cancer     Coffee ground emesis     Hypotension 2019    Elevated troponin 2019    Dyspnea 2019    Electrolyte abnormality 2019    Non-intractable vomiting with nausea 2019    Weakness 2019    Sepsis ([address]     Pneumonia 2019        Past Medical History:   Diagnosis Date    Cancer [facility])     breast, radiation and mastectomy    Hyperlipidemia     Hypertension      Past Surgical History:   Procedure Laterality Date    MASTECTOMY      bilateral, first one in , 2nd one few years ago    THORACENTESIS Right 2019    790 ml clear, yellow fluid removed by Dr. Vu Stewart: Yes  Patient assessed for rehabilitation services?: Yes  Family / Caregiver Present: No  General Comment  Comments: Pt awake in bed, agreeable to PT eval    Restrictions:  Restrictions/Precautions: Fall Risk     SUBJECTIVE: Subjective: Dtr reporting pt is eating for the first time, hopeful she can keep it down.   Pre Treatment Pain Screening  Pain at present: 0    Post Treatment Pain Screening:   Pain Screening  Patient Currently in Pain: No  Pain Assessment  Pain Level: 0    Prior Level of Function:  Social/Functional History  Lives With: Spouse  Type of Home: House  Home Layout: One level  Home Access: Stairs to enter with rails  Entrance Stairs - Number of Steps: 3  Bathroom Shower/Tub: Walk-in shower  Bathroom Equipment: Grab bars in shower  ADL Assistance: Independent  Homemaking Assistance: Independent  Ambulation Assistance: Independent(no AD)  Transfer Assistance: Independent  Active : Yes  Additional Comments: was admitted to hospital 2-3 weeks ago with PNA, has had increased weakness since then    OBJECTIVE:   Vision/Hearing:  Vision: Impaired  Vision Exceptions: Wears glasses at all times  Hearing: Within functional limits    Cognition:  Overall Orientation Status: Within Functional Limits  Follows Commands: Within Functional Limits         ROM:  RLE AROM: WFL  LLE AROM : WFL    Strength:  Strength RLE  Comment: grossly 4-/5  Strength LLE  Comment: grossly 4-/5    Neuro:  Balance  Sitting - Static: Good  Sitting - Dynamic: Good;-  Standing - Static: Poor;+  Standing - Dynamic: Poor;+             Bed mobility  Supine to Sit: Stand by assistance  Sit to Supine: Minimal assistance(assist to lift LEs into bed)    Transfers  Sit to Stand: Minimal Assistance(assist for postural control as pt initially retropulsive with stand, relying on back of knees on bed for support)  Stand to sit: Minimal Assistance  Comment: able to obtain upright standing with increased time, relying on therapist min A to maintain postural control  Increased time sitting at EOB for breathing control, postural control. Dynamic activities at EOB pt requires CGA-min A to maintain upright. Ambulation  Ambulation?: Yes  Ambulation 1  Surface: level tile  Device: Hand-Held Assist  Other Apparatus: O2  Assistance: Minimal assistance  Quality of Gait: decreased pace, postural sway requiring assist to maintain upright  Distance: pt able to take 3 side steps to R, L, and R again with HHA along EOB  Comments: HR up to 116 with activity, 02  remains >95%    Activity Tolerance  Activity Tolerance: Patient limited by endurance  Activity Tolerance: good motivation, tolerated above amount of mobility without s/s medical distress          ASSESSMENT:   Body structures, Functions, Activity limitations: Decreased functional mobility ; Decreased strength;Decreased endurance;Decreased balance  Decision Making: High Complexity  History: high  Exam: high  Clinical Presentation: high    Prognosis: Good  Patient Education: PT POC    DISCHARGE RECOMMENDATIONS:  Discharge Recommendations: Continue to assess pending progress, Patient would benefit from continued therapy after discharge    Assessment: Pt demonstrates the above deficits and decline in functional mobility status placing them at increased risk for falls. Pt would benefit from physical therapy to address above deficits and allow for safe return home at highest level of function, decrease risk for falls, and improve QOL.     REQUIRES PT FOLLOW UP: Yes      PLAN OF CARE:  Plan  Times per week: 3-6  Current Treatment Recommendations: Strengthening, Functional Mobility Training, Neuromuscular Re-education, Equipment Evaluation, Education, & procurement, Transfer Training, Gait Training, Safety Education & Training, Balance Training, Endurance Training, Stair training, Patient/Caregiver Education & Training, Home Exercise Program  Safety Devices  Type of devices: Call light within reach, Bed alarm in place, Nurse notified    Goals:  Patient goals : agreeable to PT POC  Short term goals  Short term goal 1: pt to stand >5 min at [address] term goal 2: SBA with HEP  Long term goals  Long term goal 1: indep with bed mobility  Long term goal 2: supervision for transfers  Long term goal 3: pt to ambulate >50 ft FWW supervision    [facility] (6 CLICK) [address] Raw Score : 13     Therapy Time:   Individual   Time In 1139   Time Out 1158   Minutes 19       Bed mob/transfers: 10 min    Karin De La Rosa, PT, 05/10/19 at 1:13 PM

## 2019-05-11 NOTE — PROGRESS NOTES
Ambulation  Ambulation?: Yes  Ambulation 1  Surface: level tile  Device: Rolling Walker  Other Apparatus: O2  Assistance: Minimal assistance  Quality of Gait: decreased pace, postural sway  Distance: 4' FWD/BWD x2   Comments: vitals stay WNL pt with good tolerance. Exercises  Quad Sets: x 20  Gluteal Sets: x 20   Hip Flexion: x 15  Knee Long Arc Quad: x 15  Ankle Pumps: x 20  Comments: LE strengthening for improved functional mobility and activity tolerance. ASSESSMENT:  Body structures, Functions, Activity limitations: Decreased functional mobility ; Decreased strength;Decreased endurance;Decreased balance    Assessment: pt with good tolerance to treatment, good motivation and participation.      Activity Tolerance  Activity Tolerance: Patient Tolerated treatment well       Discharge Recommendations:  Continue to assess pending progress, Patient would benefit from continued therapy after discharge    Goals:  Short term goals  Short term goal 1: pt to stand >5 min at LRAD CGA  Short term goal 2: SBA with HEP  Long term goals  Long term goal 1: indep with bed mobility  Long term goal 2: supervision for transfers  Long term goal 3: pt to ambulate >50 ft FWW supervision  Patient Goals   Patient goals : agreeable to PT POC    PLAN:   Plan  Times per week: 3-6  Current Treatment Recommendations: Strengthening, Functional Mobility Training, Neuromuscular Re-education, Equipment Evaluation, Education, & procurement, Transfer Training, Gait Training, Safety Education & Training, Balance Training, Endurance Training, Stair training, Patient/Caregiver Education & Training, Home Exercise Program  Safety Devices  Type of devices: Call light within reach, Chair alarm in place, Left in chair, All fall risk precautions in place, Nurse notified     [facility] (6 CLICK) [address] Mobility Raw Score : 14      Therapy Time   Individual   Time In 1015   Time Out 1040   Minutes 25 BM/Trsf: 10  Gait: 8  Therex: 7        Taylor Cockayne, PTA, 05/11/19 at 10:47 AM

## 2019-05-11 NOTE — PROGRESS NOTES
Physician Progress Note    2019   11:21 AM    Name:  Roderick Brandt  MRN:    [id number]      Day: 4     Admit Date: 2019 11:47 PM  PCP: Elio Santacruz MD    Code Status:  Full Code    Subjective:     Patient up in chair, no sp, sob, feeling well. Family at bedside. Physical Examination:      Vitals:  /62   Pulse 102   Temp 97.7 °F (36.5 °C) (Oral)   Resp 21   Ht 4' 10\" (1.473 m)   Wt 110 lb 3.7 oz (50 kg)   SpO2 98%   BMI 23.04 kg/m²   Temp (24hrs), Av.8 °F (37.1 °C), Min:97.7 °F (36.5 °C), Max:99.1 °F (37.3 °C)      General appearance: alert, cooperative and no distress.  Very thin    Mental Status: oriented to person, place and time and normal affect  Lungs: clear to auscultation bilaterally, normal effort  Heart: regular rate and rhythm, no murmur  Abdomen: soft, nontender, mild distension, no significant TTP, bowel sounds present, no masses  Extremities: no edema, redness, tenderness in the calves  Skin: no gross lesions, rashes    Data:     Labs:  Recent Labs     05/10/19  0516 19   WBC 9.4 7.2   HGB 10.9* 10.4*   * 113*     Recent Labs     05/10/19  0522 05/11/19  0458    134*   K 3.2* 3.9   CL 90* 91*   CO2 36* 37*   BUN 20 17   CREATININE 0.66 0.63   GLUCOSE 99 104*     Recent Labs     05/10/19  0522 05/11/19  0458   AST 20 22   ALT 10 11   BILITOT 1.0* 0.8*   ALKPHOS 35* 35*       Current Facility-Administered Medications   Medication Dose Route Frequency Provider Last Rate Last Dose    albuterol (PROVENTIL) nebulizer solution 2.5 mg  2.5 mg Nebulization Q2H PRN Jay Ahuja MD        ipratropium-albuterol (DUONEB) nebulizer solution 1 ampule  1 ampule Inhalation 4x daily Jay Ahuja MD        docusate sodium (COLACE) capsule 100 mg  100 mg Oral BID Opal Diaz MD   100 mg at 19 0835    polyethylene glycol (GLYCOLAX) packet 17 g  17 g Oral Daily Liss Xiong MD   17 g at 19 08    vancomycin (VANCOCIN) 750 mg in dextrose 5 % 250 mL IVPB  750 mg Intravenous Q24H Beth Farris  mL/hr at 05/11/19 1039 750 mg at 05/11/19 1039    sodium chloride flush 0.9 % injection 10 mL  10 mL Intravenous 2 times per day Rebekah Craig, APRN - CNP   10 mL at 05/11/19 0835    sodium chloride flush 0.9 % injection 10 mL  10 mL Intravenous PRN Rebekah Craig, APRN - CNP        magnesium hydroxide (MILK OF MAGNESIA) 400 MG/5ML suspension 30 mL  30 mL Oral Daily PRN Rebekah Roller, APRN - CNP        ondansetron (ZOFRAN) injection 4 mg  4 mg Intravenous Q6H PRN Rebekah Roller, APRN - CNP   4 mg at 05/08/19 0324    acetaminophen (TYLENOL) tablet 650 mg  650 mg Oral Q4H PRN Russell Roller, APRN - CNP        piperacillin-tazobactam (ZOSYN) 3.375 g in dextrose 5 % 50 mL IVPB extended infusion (mini-bag)  3.375 g Intravenous Q8H Beverly Mahan APRN - CNP 12.5 mL/hr at 05/11/19 0848 3.375 g at 05/11/19 0848    vancomycin (VANCOCIN) intermittent dosing (placeholder)   Other RX Placeholder Rebekah Craig, APRN - CNP        calcium elemental (OSCAL) tablet 500 mg  500 mg Oral Daily Billgurwinder Roller, APRN - CNP   500 mg at 05/11/19 0835    vitamin D (CHOLECALCIFEROL) tablet 1,000 Units  1,000 Units Oral Daily Billye Roller, APRN - CNP   1,000 Units at 05/11/19 0835    potassium chloride (KLOR-CON M) extended release tablet 20 mEq  20 mEq Oral BID Billgurwinder Carbajaler, APRN - CNP   20 mEq at 05/11/19 0835    simvastatin (ZOCOR) tablet 5 mg  5 mg Oral Nightly Billgurwinder Carbajaler, APRN - CNP   5 mg at 05/10/19 2144    aspirin chewable tablet 81 mg  81 mg Oral Daily Pitney Kamran, DO   81 mg at 05/11/19 0835    heparin (porcine) injection 5,000 Units  5,000 Units Subcutaneous 3 times per day Pitney Kamran, DO   5,000 Units at 05/11/19 0543    pantoprazole (PROTONIX) injection 40 mg  40 mg Intravenous BID Lb Hampton MD   40 mg at 05/11/19 0835    And    sodium chloride (PF) 0.9 % injection 10 mL  10 mL Intravenous BID Lb Hampton MD   10 mL at 05/11/19 0836    lidocaine 2 % injection 5 mL  5 mL Intradermal PRN Jerald Duque, APRN - CNP        norepinephrine (LEVOPHED) 16 mg in dextrose 5 % 250 mL infusion  2 mcg/min Intravenous Continuous Brenden Candelario MD   Stopped at 05/10/19 1100     Assessment and Plan:          Acute Hospital issues:    # shock POA- septic vs hypovolemic- due to PNA, poor oral intake, rule out SBP, right sided heart failure  - has massive ascites and bilateral pleural effusion. S/p paracentesis, f/u fluids   - resume vancomycin and zosyn   - resume Levophed to keep MAP>65, avoid hypotension   - getting albumin after paracentesis   5/9 - await cytology, unsure of source of fluid. Had thoracentesis today. Concern for abdominal fluid to have pmn 423, on iv abx.  5/10 - improved, await cytology. CT abd/pel shows poss uterine growth, will await cytology prior to further gyn workup if necessary. 5/11 - improved. Cytology showed reactive mesothelial cells, cont infxn w/u and treatment, poss empyema    # generalized weakness   - due to above     # suspected PNA POA   - resume abx   5/10 - noted likely pna now with pleural fluid removed. Cont abx. # ascites- new onset. Due to right sided heart failure vs ?thrombosis   - rule out portal vein thrombosis, will obtain liver US with doppler   5/9 - noted severe pulm htn, liver wnl    # LE edema   - DVT scan     # concern for GI bleed- positive occult stool   - monitor H/H, PPI, hold SQ heparin and asa   - GI consult     # HTN/ HLD  - resume statin.  Hold anti-hypertensive meds      DVT/PPx- SCD for now       [facility] [facility]  ICU        Electronically signed by Ryan Das MD on 5/11/2019 at 11:21 AM

## 2019-05-11 NOTE — CONSULTS
Pharmacy Vancomycin Consult     Vancomycin Day: 3  Current Dosin mg IV q24h    Temp max:  98.1F    Recent Labs     05/10/19  0522 19  0458   BUN 20 17       Recent Labs     05/10/19  0522 19  0458   CREATININE 0.66 0.63       Recent Labs     05/10/19  0516 19  0458   WBC 9.4 7.2         Intake/Output Summary (Last 24 hours) at 2019 1837  Last data filed at 2019 1729  Gross per 24 hour   Intake 887 ml   Output 625 ml   Net 262 ml       Culture Date      Source                       Results  RESPIRATORY CULTURE [[id number]] Collected: 19 1715   Order Status: Completed Specimen: Lobe, Left Upper from Bronchial Washing Updated: 19 1227    CULTURE, RESPIRATORY No Growth at 24 hrs    Gram Stain Result Rare WBC's   No epithelial cells   No organisms seen    Narrative:     ORDER#: [id number]                          ORDERED BY: GAYATRI MCCORMICK  SOURCE: Bronchial Washing Left Upper Lobe  COLLECTED:  19 17:15  ANTIBIOTICS AT PAULA.:                      RECEIVED :  19 17:15   Body Fluid Culture [[id number]] Collected: 19 1330   Order Status: Completed Specimen: Body Fluid from Ascitic Fluid Updated: 19 1023    Body Fluid Culture, Sterile No growth 72 hours   Narrative:     ORDER#: [id number]                          ORDERED BY: Indra Tom  SOURCE: Ascites Body Fluid                 COLLECTED:  19 13:30  ANTIBIOTICS AT PAULA.:                      RECEIVED :  19 13:30  Concentrate (centrifuge) body fluid add SCNFL to order   Body Fluid Culture [[id number]] Collected: 19 1402   Order Status: Completed Specimen:  Body Fluid from Thoracentesis Updated: 19 1021    Body Fluid Culture, Sterile --    No growth 24 hours   No growth in 48 hours    Narrative:     ORDER#: [id number]                          ORDERED BY: BALWINDER RIVERA  SOURCE: Thoracentesis Body Fluid           COLLECTED:  19 14:02  ANTIBIOTICS AT PAULA.:

## 2019-05-11 NOTE — PLAN OF CARE
Problem: Falls - Risk of:  Goal: Will remain free from falls  Description  Will remain free from falls  Outcome: Ongoing  Goal: Absence of physical injury  Description  Absence of physical injury  Outcome: Ongoing     Problem: Discharge Planning:  Goal: Discharged to appropriate level of care  Description  Discharged to appropriate level of care  Outcome: Ongoing  Goal: Participates in care planning  Description  Participates in care planning  Outcome: Ongoing     Problem: Cardiac Output - Decreased:  Goal: Avoidance of environmental tobacco smoke  Description  Absence of orthostatic hypotension  Outcome: Ongoing  Goal: Cardiac output within specified parameters  Description  Cardiac output within specified parameters  Outcome: Ongoing  Goal: Hemodynamic stability will improve  Description  Hemodynamic stability will improve  Outcome: Ongoing     Problem: Fluid Volume - Imbalance:  Goal: Absence of imbalanced fluid volume signs and symptoms  Description  Absence of imbalanced fluid volume signs and symptoms  Outcome: Ongoing     Problem: Tissue Perfusion, Altered:  Goal: Circulatory function within specified parameters  Description  Circulatory function within specified parameters  Outcome: Ongoing     Problem: Tissue Perfusion - Cardiopulmonary, Altered:  Goal: Absence of angina  Description  Absence of angina  Outcome: Ongoing  Goal: Hemodynamic stability will improve  Description  Hemodynamic stability will improve  Outcome: Ongoing     Problem: Airway Clearance - Ineffective:  Goal: Clear lung sounds  Description  Clear lung sounds  Outcome: Ongoing  Goal: Ability to maintain a clear airway will improve  Description  Ability to maintain a clear airway will improve  Outcome: Ongoing     Problem: Fluid Volume - Deficit:  Goal: Achieves intake and output within specified parameters  Description  Achieves intake and output within specified parameters  Outcome: Ongoing     Problem: Gas Exchange - Impaired:  Goal: Levels of oxygenation will improve  Description  Levels of oxygenation will improve  Outcome: Ongoing     Problem: Hyperthermia:  Goal: Ability to maintain a body temperature in the normal range will improve  Description  Ability to maintain a body temperature in the normal range will improve  Outcome: Ongoing     Problem: Nutrition  Goal: Optimal nutrition therapy  5/11/2019 0016 by Antoinette Welch RN  Outcome: Ongoing  5/10/2019 1351 by Janet Gamino RD, LD  Outcome: Ongoing     Problem: Breathing Pattern - Ineffective:  Goal: Ability to achieve and maintain a regular respiratory rate will improve  Description  Ability to achieve and maintain a regular respiratory rate will improve  Outcome: Ongoing     Problem: Mobility - Impaired:  Goal: Mobility will improve  Description  Mobility will improve  5/11/2019 0016 by Antoinette Welch RN  Outcome: Ongoing  5/10/2019 1315 by Bhargav Gallo PT  Outcome: Ongoing     Problem: Risk for Impaired Skin Integrity  Goal: Tissue integrity - skin and mucous membranes  Description  Structural intactness and normal physiological function of skin and  mucous membranes.   Outcome: Ongoing

## 2019-05-11 NOTE — PROGRESS NOTES
[facility] EMERGENCY [facility] AT [place] Respiratory Therapy Evaluation   Current Order:  DUO W4W/A    Home Regimen:none      Ordering Physician: Krystin Christianson  Re-evaluation Date: 5/14  Diagnosis: thoracentesis/ bronch      Patient Status: Stable / Unstable + Physician notified    The following MDI Criteria must be met in order to convert aerosol to MDI with spacer. If unable to meet, MDI will be converted to aerosol:  []  Patient able to demonstrate the ability to use MDI effectively  []  Patient alert and cooperative  []  Patient able to take deep breath with 5-10 second hold  []  Medication(s) available in this delivery method   []  Peak flow greater than or equal to 200 ml/min            Current Order Substituted To  (same drug, same frequency)   Aerosol to MDI [] Albuterol Sulfate 0.083% unit dose by aerosol Albuterol Sulfate MDI 2 puffs by inhalation with spacer    [] Levalbuterol 1.25 mg unit dose by aerosol Levalbuterol MDI 2 puffs by inhalation with spacer    [] Levalbuterol 0.63 mg unit dose by aerosol Levalbuterol MDI 2 puffs by inhalation with spacer    [] Ipratropium Bromide 0.02% unit dose by aerosol Ipratropium Bromide MDI 2 puffs by inhalation with spacer    [] Duoneb (Ipratropium + Albuterol) unit dose by aerosol Ipratropium MDI + Albuterol MDI 2 puffs by inhalation w/spacer   MDI to Aerosol [] Albuterol Sulfate MDI Albuterol Sulfate 0.083% unit dose by aerosol    [] Levalbuterol MDI 2 puffs by inhalation Levalbuterol 1.25 mg unit dose by aerosol    [] Ipratropium Bromide MDI by inhalation Ipratropium Bromide 0.02% unit dose by aerosol    [] Combivent (Ipratropium + Albuterol) MDI by inhalation Duoneb (Ipratropium + Albuterol) unit dose by aerosol   Treatment Assessment [Frequency/Schedule]:  Change frequency to: __duo neb qid and albuterol q2 prn______per Protocol, P&T, MEC      Points 0 1 2 3 4   Pulmonary Status  Non-Smoker  []   Smoking history   < 20 pack years  []   Smoking history  ?  20 pack years  []   Pulmonary Disorder  (acute or chronic)  []   Severe or Chronic w/ Exacerbation  [x]     Surgical Status No [x]   Surgeries     General []   Surgery Lower []   Abdominal Thoracic or []   Upper Abdominal Thoracic with  PulmonaryDisorder  []     Chest X-ray Clear/Not  Ordered     []  Chronic Changes  Results Pending  []  Infiltrates, atelectasis, pleural effusion, or edema  []  Infiltrates in more than one lobe []  Infiltrate + Atelectasis, &/or pleural effusion  [x]    Respiratory Pattern Regular,  RR = 12-20 []  Increased,  RR = 21-25 [x]  VACA, irregular,  or RR = 26-30 []  Decreased FEV1  or RR = 31-35 []  Severe SOB, use  of accessory muscles, or RR ? 35  []    Mental Status Alert, oriented,  Cooperative [x]  Confused but Follows commands []  Lethargic or unable to follow commands []  Obtunded  []  Comatose  []    Breath Sounds Clear to  auscultation  []  Decreased unilaterally or  in bases only []  Decreased  bilaterally  [x]  Crackles or intermittent wheezes []  Wheezes []    Cough Strong, Spontan., & nonproductive []  Strong,  spontaneous, &  productive []  Weak,  Nonproductive [x]  Weak, productive or  with wheezes []  No spontaneous  cough or may require suctioning []    Level of Activity Ambulatory []  Ambulatory w/ Assist  [x]  Non-ambulatory []  Paraplegic []  Quadriplegic []    Total    Score:__14_____     Triage Score:____3____      Tri       Triage:     1. (>20) Freq: Q3    2. (16-20) Freq: Q4   3. (11-15) Freq: QID & Albuterol Q2 PRN    4. (6-10) Freq: TID & Albuterol Q2 PRN    5. (0-5) Freq Q4prn

## 2019-05-11 NOTE — FLOWSHEET NOTE
Rossi Mccabe is a 66 y.o. female patient.     Current Facility-Administered Medications   Medication Dose Route Frequency Provider Last Rate Last Dose    docusate sodium (COLACE) capsule 100 mg  100 mg Oral BID Wyatt Houston MD   100 mg at 05/11/19 0835    polyethylene glycol (GLYCOLAX) packet 17 g  17 g Oral Daily Wyatt Houston MD   17 g at 05/11/19 0835    vancomycin (VANCOCIN) 750 mg in dextrose 5 % 250 mL IVPB  750 mg Intravenous Q24H Wyatt Houston MD   Stopped at 05/10/19 1007    sodium chloride flush 0.9 % injection 10 mL  10 mL Intravenous 2 times per day MITCH Villagomez - CNP   10 mL at 05/11/19 0835    sodium chloride flush 0.9 % injection 10 mL  10 mL Intravenous PRN MITCH Villagomez CNP        magnesium hydroxide (MILK OF MAGNESIA) 400 MG/5ML suspension 30 mL  30 mL Oral Daily PRN MITCH Villagomez - CNP        ondansetron (ZOFRAN) injection 4 mg  4 mg Intravenous Q6H PRN MITCH Villagomez - CNP   4 mg at 05/08/19 0324    acetaminophen (TYLENOL) tablet 650 mg  650 mg Oral Q4H PRN Ki Hudson APRN - CNP        piperacillin-tazobactam (ZOSYN) 3.375 g in dextrose 5 % 50 mL IVPB extended infusion (mini-bag)  3.375 g Intravenous Q8H Beverly MITCH Mahan - CNP 12.5 mL/hr at 05/11/19 0848 3.375 g at 05/11/19 0848    vancomycin (VANCOCIN) intermittent dosing (placeholder)   Other RX Placeholder MITCH Villagomez CNP        calcium elemental (OSCAL) tablet 500 mg  500 mg Oral Daily MITCH Villagomez - CNP   500 mg at 05/11/19 0835    vitamin D (CHOLECALCIFEROL) tablet 1,000 Units  1,000 Units Oral Daily MITCH Villagomez - CNP   1,000 Units at 05/11/19 0835    potassium chloride (KLOR-CON M) extended release tablet 20 mEq  20 mEq Oral BID MITCH Villagomez CNP   20 mEq at 05/11/19 0835    simvastatin (ZOCOR) tablet 5 mg  5 mg Oral Nightly MITCH Villagomez CNP   5 mg at 05/10/19 2145    aspirin chewable tablet 81 mg  81 mg Oral Daily Donovan Andrew, DO   81 mg at 05/11/19 0835    heparin (porcine) injection 5,000 Units  5,000 Units Subcutaneous 3 times per day Don Azar DO   5,000 Units at 05/11/19 0543    pantoprazole (PROTONIX) injection 40 mg  40 mg Intravenous BID Myranda Carver MD   40 mg at 05/11/19 0835    And    sodium chloride (PF) 0.9 % injection 10 mL  10 mL Intravenous BID Myranda Carver MD   10 mL at 05/11/19 0836    lidocaine 2 % injection 5 mL  5 mL Intradermal PRN Jerald Duque APRN - CNP        ipratropium-albuterol (DUONEB) nebulizer solution 1 ampule  1 ampule Inhalation Q4H WA Radha Escalante MD   1 ampule at 05/11/19 0932    norepinephrine (LEVOPHED) 16 mg in dextrose 5 % 250 mL infusion  2 mcg/min Intravenous Continuous Radha Escalante MD   Stopped at 05/10/19 1100     No Known Allergies  Active Problems:    Pneumonia    Hypotension    Elevated troponin    Dyspnea    Electrolyte abnormality    Non-intractable vomiting with nausea    Weakness    Sepsis (HCC)    Pleural effusion, bilateral    Other ascites    Collapse of left lung    History of breast cancer    Coffee ground emesis  Resolved Problems:    * No resolved hospital problems. *    Blood pressure 120/64, pulse 92, temperature 97.7 °F (36.5 °C), temperature source Oral, resp. rate 24, height 4' 10\" (1.473 m), weight 110 lb 3.7 oz (50 kg), SpO2 100 %. Subjective:  Diet:  Dietary issues: tolerating diet. Activity level: Activity impairment: assist reposition. Pain:  She reports no pain. Objective:  General Appearance:  Comfortable and not in pain. Vital signs: (most recent): Blood pressure 120/64, pulse 92, temperature 97.7 °F (36.5 °C), temperature source Oral, resp. rate 24, height 4' 10\" (1.473 m), weight 110 lb 3.7 oz (50 kg), SpO2 100 %. Vital signs are normal.    Heart: Regular rhythm. Assessment:  (Awake and resting in bed. Am assessment completed. resp even and nonlabored. Denies any pain or discomfort. Family member at bedside.  Assist reposition and set up for breakfast. Electronically signed by Greer Simmonds, RN on 5/11/2019 at 9:54 AM  ).        Greer Simmonds, RN  5/11/2019

## 2019-05-11 NOTE — PROGRESS NOTES
Pulmonary ICU Progress Note    PRIMARY SERVICE: Pulmonary Disease    INTERVAL HPI: Patient seen and examined at bedside, Interval Notes, orders reviewed. Nursing notes noted  Patient is better , she is off pressors agent. She has no complaint of chest pain, abdominal pain, fever, shortness of breath. No fever or chills. She had a bronchoscopy done for clearance of mucous plugging. She is not using BiPAP therapy. She is on O2 via nasal cannula and feeling okay. Review of Systems   as above    Intake/Output Summary (Last 24 hours) at 5/11/2019 1155  Last data filed at 5/11/2019 1142  Gross per 24 hour   Intake 767 ml   Output 1125 ml   Net -358 ml       Vitals:  BP 99/71   Pulse 96   Temp 98.1 °F (36.7 °C) (Oral)   Resp 24   Ht 4' 10\" (1.473 m)   Wt 110 lb 3.7 oz (50 kg)   SpO2 99%   BMI 23.04 kg/m²   EXAM:  General: comfortable in bed, No distress. Head: Atraumatic ,Normocephalic   Eyes: PERRL. No sclera icterus. No conjunctival injection. No discharge   ENT: No nasal  discharge. Pharynx clear. Neck:  Trachea midline. No thyromegaly, no JVD, No cervical adenopathy. Resp : Normal effort,  No accessory muscle use. Bibasilar Rales. No wheezing. No rhonchi. No dullness on percussion. CV: Normal  rate. Regular rhythm. No mumur ,  Rub or gallop  ABD: Non-tender. Distention +, No masses. Noorganmegaly. Normal bowel sounds. No hernia.   EXT: No Pitting, No Cyanosis No clubbing    IV:    norepinephrine Stopped (05/10/19 1100)       Medications:  Scheduled Meds:   ipratropium-albuterol  1 ampule Inhalation 4x daily    docusate sodium  100 mg Oral BID    polyethylene glycol  17 g Oral Daily    vancomycin  750 mg Intravenous Q24H    sodium chloride flush  10 mL Intravenous 2 times per day    piperacillin-tazobactam  3.375 g Intravenous Q8H    vancomycin (VANCOCIN) intermittent dosing (placeholder)   Other RX Placeholder    calcium elemental  500 mg Oral Daily    vitamin D  1,000 Units Oral Daily  potassium chloride  20 mEq Oral BID    simvastatin  5 mg Oral Nightly    aspirin  81 mg Oral Daily    heparin (porcine)  5,000 Units Subcutaneous 3 times per day    pantoprazole  40 mg Intravenous BID    And    sodium chloride (PF)  10 mL Intravenous BID       PRN Meds:  albuterol, sodium chloride flush, magnesium hydroxide, ondansetron, acetaminophen, lidocaine    ABG:     Lab Results   Component Value Date    PHART 7.456 05/08/2019    DTO9LRG 50 05/08/2019    PO2ART 62 05/08/2019    YFI8PRS 35.2 05/08/2019    BEART 11 05/08/2019    N5XVJJQR 92 05/08/2019     Lab Results   Component Value Date    LACTA 1.6 05/07/2019     O2 Device: Nasal cannula  O2 Flow Rate (L/min): 4 L/min    MV Settings:     FiO2 : 40 %      Radiology      Ct Abdomen Pelvis Wo Contrast Additional Contrast? None    Result Date: 5/8/2019  COMPARISON: None available. HISTORY:  ABDOMINAL PAIN COMMENTS:  WEAKNESS, POSSIBLE DEHYDRATION TECHNIQUE: procedure Thin slice spiral CT was performed from the lung bases through the iliac crests without contrast administration. Contrast enhancement was not employed due to clinician's order. Sagittal and coronal reconstructions were also performed. FINDINGS: Images through the lung bases demonstrate a large pleural effusion on the right with consolidation that contains air bronchograms. Smaller pleural effusion is seen on the left and there is also consolidation. The left effusion may also be loculated. Air bronchograms are also seen in the right middle lobe. The heart is enlarged. A hiatal hernia is seen. A very large amount of ascites is seen. Non-contrast images of the liver,  pancreas, spleen and adrenals are unremarkable. Extrarenal pelvises are seen in both kidneys. The ureters appear distended. No  dilated loops of bowel or free air is seen. Diverticulosis coli is seen in the sigmoid colon without evidence for diverticulitis. The endometrial cavity of the uterus appears prominent.  There are represent pneumonia versus compressive atelectasis. COMPARISON: CT of the abdomen dating May 7, 2019 DIAGNOSIS: Left lung collapse. COMMENTS: E86.1 Hypovolemia ICD10 TECHNIQUE:   Spiral scanning of the chest was performed without contrast as per the referring physician. CT Dose-Length Product (estimate related to radiation exposure from this exam):  161.69  mGy*cm. FINDINGS: There is a large left pleural effusion with complete collapse of the left lung. There is obstruction of the left mainstem bronchus which may be secondary to extrinsic compression from the effusion versus internal obstruction such as a mucous plug. There is a small right pleural effusion. There is right basilar opacification which may represent pneumonia versus compressive atelectasis. There is atherosclerotic calcification of the aorta and its branches. Visualized osseous structures are unremarkable. Evaluation of the mediastinum is limited secondary to lack of IV contrast and collapse of the left lung. Lymphadenopathy cannot be well evaluated for. Visualized upper abdomen demonstrates bilateral dilatation of the renal collecting systems as seen on prior study. Ct Abdomen Pelvis W Iv Contrast Additional Contrast? None    Result Date: 5/10/2019  EXAMINATION:  CT ABDOMEN AND PELVIS WITH IV CONTRAST CLINICAL HISTORY:  HISTORY OF BREAST CANCER, NEW ONSET OF ASCITES, RLQ ABDOMINAL PAIN COMPARISON:  5/7/2019 TECHNIQUE:  CT abdomen and pelvis obtained following IV injection 100 mL of Isovue-370. FINDINGS:  CT scans through the lower chest demonstrate bilateral pleural effusions with bilateral lower lobe atelectasis and pulmonary infiltrate in the RLL. Additional details may be found on the CT chest report from 5/9/2019. There is a small to moderate amount of ascites in the abdomen and pelvis. The volume of ascites has decreased since the 5/7/2019 CT abdomen.  The liver, gallbladder, spleen, pancreas and adrenal glands appear normal. There is a prominent extrarenal pelvis  in each kidney and there is right pelvocaliectasis. Also, there is bilateral hydroureters which may be secondary to the distended urinary bladder. There is a Hernandez catheter within the distended bladder and this raises the possibility of a malfunctioning  Hernandez catheter. There is no retroperitoneal adenopathy. There is atherosclerotic calcification of the abdominal aorta without an abdominal aorta aneurysm. No abdominal abscess or pneumoperitoneum. There is a nondistended stomach with an air/fluid level. There is a nondistended fluid-filled small bowel which is nonspecific or may represent an enteritis in the proper clinical setting. Only a small portion of the appendix is visualized and there is no definitive CT evidence suggesting appendicitis. There is gas and fecal matter scattered throughout the colon and no evidence of bowel obstruction. There is sigmoid colon diverticulosis without diverticulitis. CT scan of pelvis demonstrates a normal size uterus with hypodensity within the endometrial cavity and this may represent fluid within the uterine cavity. An intrauterine lesion or endometrial lesion cannot be excluded on this CT abdomen/pelvis. There is  no pelvic adenopathy. There is no inguinal hernia or adenopathy. There is an old L4 compression fracture. 1. Bilateral pleural effusions with concomitant bilateral lower lobe atelectasis and RLL pulmonary infiltrate. 2. Small to moderate amount of ascites in abdomen and pelvis. Decreased amount of ascites since 5/7/2019 CT scan. 3. Distended urinary bladder with a Hernandez catheter in place and this suggests possibly a malfunctioning Hernandez catheter. 4. Bilaterally prominent extrarenal pelves and right pelvocaliectasis. Also, there is bilateral hydroureters without visible ureteral calculi and this may be secondary to the distended urinary bladder.  5. Nondistended fluid-filled small bowel is nonspecific or may represent an enteritis in the proper clinical setting. 6. Visualized small portion of appendix appears unremarkable and there is colonic diverticulosis without diverticulitis. 7. Hypodense endometrial cavity may represent fluid within the uterine cavity but endometrial lesion not excluded. All CT scans at this facility use dose modulation, iterative reconstruction, and/or weight based dosing when appropriate to reduce radiation dose to as low as reasonably achievable. Xr Chest Portable    Result Date: 5/11/2019  Portable chest radiograph History: Respiratory failure Technique: AP portable view of the chest obtained. Comparison: Chest radiograph from May 10, 2019 50 chest from May 9, 2019 Findings: Right internal jugular central catheter remains. Atherosclerotic calcification of the thoracic aorta. Moderate bilateral pleural effusions, greater on the left, likely with adjacent atelectasis or infiltrate. No pneumothorax. Heart border is obscured by the left pleural effusion. Osseous structures appear intact. Overall no significant interval change. No significant interval change of moderate bilateral pleural effusions, left greater than right. No pneumothorax. Xr Chest Portable    Result Date: 5/10/2019  EXAMINATION: PORTABLE CHEST X-RAY FROM 5/10/2019 AT 9:36 AM CLINICAL HISTORY: STATUS POST RIGHT THORACENTESIS, EVALUATE FOR POSSIBLE POSTTHORACENTESIS PNEUMOTHORAX, FOLLOW-UP PLEURAL EFFUSIONS COMPARISONS: 5/9/2019 FINDINGS: There is no evidence of a pneumothorax following the right thoracentesis procedure. There has been a decrease in the volume of the bilateral pleural effusions. There remains a small pleural effusion blunting the right CP angle and a moderately large left pleural effusion. Suspect underlying atelectasis and/or infiltrate in the left lung. Mild cardiomegaly and atherosclerotic tortuous aorta is once again noted. A right CVC catheter appears in satisfactory position in the SVC.  Also, there is osteopenia and mild degenerative bone spurring throughout the spine. 1. NO PNEUMOTHORAX FOLLOWING THE RIGHT THORACENTESIS PROCEDURE. 2. DECREASED VOLUME OF THE BILATERAL PLEURAL EFFUSIONS SINCE PRIOR CHEST FILMS. 3. SUSPECT UNDERLYING ATELECTASIS AND/OR INFILTRATE IN THE LEFT LUNG SIMILAR TO PRIOR CXR. Xr Chest Portable    Result Date: 5/9/2019  EXAMINATION: XR CHEST, PORTABLE SINGLE VIEW: DATE AND TIME: 5/9/2019 at 12:00 AM. CLINICAL HISTORY: SHORTNESS OF BREATH. POST RIGHT THORACENTESIS. DR. Ellison [place] TO READ. COMPARISONS: May 8, 2019. FINDINGS: CVP line unchanged in position. Large bilateral effusions with hilar haze and vascular congestion consistent with CHF. No change. FLORID PULMONARY EDEMA WITH LARGE EFFUSIONS. NO CHANGE. Xr Chest Portable    Result Date: 5/8/2019  EXAMINATION: XR CHEST PORTABLE CLINICAL HISTORY: CENTRAL LINE COMPARISONS: MAY 7, 2019, APRIL 27, 2019 FINDINGS: Right jugular vein central line with tip in right atrium. Kyphotic. Osteopenic. Blunting costophrenic angles bilaterally with increased opacities mid and lower lungs bilaterally. Pulmonary vasculature prominent, and indistinct. BILATERAL PLEURAL EFFUSIONS. BILATERAL EDEMA VERSUS ATELECTASIS/PNEUMONIA. Xr Chest Portable    Result Date: 5/8/2019  COMPARISON: None available. HISTORY:  ABDOMINAL PAIN COMMENTS:  WEAKNESS, POSSIBLE DEHYDRATION TECHNIQUE: procedure Thin slice spiral CT was performed from the lung bases through the iliac crests without contrast administration. Contrast enhancement was not employed due to clinician's order. Sagittal and coronal reconstructions were also performed. FINDINGS: Images through the lung bases demonstrate a large pleural effusion on the right with consolidation that contains air bronchograms. Smaller pleural effusion is seen on the left and there is also consolidation. The left effusion may also be loculated. Air bronchograms are also seen in the right middle lobe. The heart is enlarged.  A hiatal hernia is seen. A very large amount of ascites is seen. Non-contrast images of the liver,  pancreas, spleen and adrenals are unremarkable. Extrarenal pelvises are seen in both kidneys. The ureters appear distended. No  dilated loops of bowel or free air is seen. Diverticulosis coli is seen in the sigmoid colon without evidence for diverticulitis. The endometrial cavity of the uterus appears prominent. There are diffuse atherosclerotic calcifications. Mild compression deformity is seen at L4. No destructive bony lesions are seen. 1. Bilateral pleural effusions with consolidation. The right pleural effusion is larger than left and the left pleural effusion may be loculated 2. A very large volume of ascites. 3. The collecting systems of both kidneys are mildly prominent. No obstructing calcifications are nephrolithiasis seen. 4. Mild compression deformity is seen at L4 that is age indeterminate. All CT scans at this facility use dose modulation, iterative reconstruction, and/or weight based dosing when appropriate to reduce radiation dose to as low as reasonably achievable. Us Abdomen Limited    Result Date: 5/8/2019  EXAMINATION: US ABDOMEN LIMITED, US DUP ABD PEL RETRO SCROT COMPLETE CLINICAL HISTORY: 40-year-old with abdominal distention and new onset ascites COMPARISONS: Unenhanced CT abdomen 5/7/2019 FINDINGS: Right upper quadrant ultrasound as well as liver Doppler ultrasound were performed by a registered technologist and images submitted for evaluation. Visualized liver is normal in echogenicity without significant lobulated borders. No ultrasound  signs of intrahepatic biliary ductal dilatation or hepatic mass. Liver vascular Doppler demonstrates a mildly enlarged portal vein, measuring 1.4 cm in transverse dimension.  However spectral analysis demonstrates normal flow direction into the liver in the main portal vein, right and left portal veins and a posterior branch of the right portal vein with the right portal vein with normal phasicity of the flow. Hepatic artery flow directionality is also normal into the liver. There is a normal appearing low resistive arterial waveform  Resistive index of the hepatic artery was not obtained on this examination. Right, mid and lower left hepatic veins demonstrate normal direction of flow into the IVC with pulsatile waveforms predominantly below baseline. Common duct in the cade hepatis is normal in caliber measuring 2 to 3 mm. Gallbladder demonstrates multiple small foci of gallbladder wall thickening suspicious for tiny polyps. No diffuse gallbladder wall thickening or mural edema. No large gallstones. Note is made of upper abdominal ascites as well as a right pleural effusion. Pancreas is mostly obscured and not well imaged on this study. UPPER ABDOMINAL ASCITES AND RIGHT PLEURAL EFFUSION. HEPATIC ECHOTEXTURE IS WITHIN NORMAL LIMITS WITH NORMAL DIRECTION OF FLOW ON LIVER VASCULAR ULTRASOUND. PROMINENT MAIN PORTAL VEIN. SMALL FOCI OF GALLBLADDER WALL THICKENING SUSPICIOUS FOR A POLYPS. OTHERWISE NEGATIVE BILIARY ULTRASOUND. NONCONTRIBUTORY PANCREATIC EVALUATION    Us Dup Lower Extremities Bilateral Venous    Result Date: 5/8/2019  EXAMINATION: US DUP LOWER EXTREMITIES BILATERAL VENOUS CLINICAL HISTORY: 70-year-old with bilateral lower extremity swelling COMPARISONS: None available. FINDINGS: Duplex and color Doppler ultrasounds were performed of the bilateral lower extremity deep venous systems. Visualized portions of both common femoral veins, femoral veins, and popliteal veins demonstrate satisfactory compression, color flow, and  augmentation. Segmental views of posterior tibial and peroneal deep calf veins also demonstrate satisfactory compression and color flow. Deep calf veins are incompletely imaged in their entirety.      NO ULTRASOUND SIGNS OF THROMBUS IN THE BILATERAL LOWER EXTREMITY DEEP VENOUS SYSTEMS AS DETAILED ABOVE      Results:  CBC:   Recent Labs     05/09/19 0454 05/10/19  0516 05/11/19 0458   WBC 14.0* 9.4 7.2   HGB 10.7* 10.9* 10.4*   HCT 33.0* 32.5* 31.5*   MCV 80.7* 80.8* 80.4*   * 118* 113*     BMP:   Recent Labs     05/09/19  0507 05/10/19  0522 05/11/19  0458   * 137 134*   K 3.2* 3.2* 3.9   CL 87* 90* 91*   CO2 33* 36* 37*   BUN 30* 20 17   CREATININE 1.17* 0.66 0.63     LIVER PROFILE:   Recent Labs     05/09/19  0507 05/09/19  0807 05/10/19  0522 05/11/19  0458   AST 20  --  20 22   ALT 11  --  10 11   LIPASE  --  59  --   --    BILITOT 0.9*  --  1.0* 0.8*   ALKPHOS 36*  --  35* 35*       Assessment:  1. Acute hypoxic respiratory failure secondary to left lung collapse due to mucous plugging, improved   2. Shock, septic versus decreased active intravascular volume, improved  3. Pericardial and pleural effusion most probably related to abdominal malignancy. 4. Pneumonia  5. Acute kidney injury  improved today  6. Pulmonary hypertension. 7. Diastolic dysfunction   8. Demand ischemia  9. Mild hypokalemia     Will continue broad-spectrum antibiotic. Patient is off Levophed. Continue bronchodilator therapy with DuoNeb's 4 times a day and albuterol every 2 hours when necessary. Continue IV Zosyn. Continue O2 to keep saturation 90% or above okay to transfer patient out of ICU. Patient family at bedside.      SIGNATURE: Myrna Lira MD, [facility]

## 2019-05-11 NOTE — PROGRESS NOTES
Progress Note  Patient: Crista Peña  Unit/Bed: K808/D107-04  YOB: 1940  MRN: [id number]  Acct: [de-identified]   Admitting Diagnosis: Hypovolemia [E86.1]  Hypotension [I95.9]  Admit Date:  5/7/2019  Hospital Day: 4    Chief Complaint: sepsis resp failure PA HTn pna     Histories:  Past Medical History:   Diagnosis Date    Cancer [facility])     breast, radiation and mastectomy    Hyperlipidemia     Hypertension      Past Surgical History:   Procedure Laterality Date    MASTECTOMY      bilateral, first one in 1958, 2nd one few years ago    THORACENTESIS Right 05/09/2019    790 ml clear, yellow fluid removed by Dr. Urban Pedroza History   Problem Relation Age of Onset    No Known Problems Mother     Heart Attack Father     Heart Disease Neg Hx     Stroke Neg Hx     Cancer Neg Hx     COPD Neg Hx     Diabetes Neg Hx      Social History     Socioeconomic History    Marital status:      Spouse name: None    Number of children: None    Years of education: None    Highest education level: None   Occupational History    None   Social Needs    Financial resource strain: None    Food insecurity:     Worry: None     Inability: None    Transportation needs:     Medical: None     Non-medical: None   Tobacco Use    Smoking status: Never Smoker    Smokeless tobacco: Never Used   Substance and Sexual Activity    Alcohol use: Not Currently    Drug use: Never    Sexual activity: None   Lifestyle    Physical activity:     Days per week: None     Minutes per session: None    Stress: None   Relationships    Social connections:     Talks on phone: None     Gets together: None     Attends Jainism service: None     Active member of club or organization: None     Attends meetings of clubs or organizations: None     Relationship status: None    Intimate partner violence:     Fear of current or ex partner: None     Emotionally abused: None     Physically abused: None     Forced sexual activity: None   Other Topics Concern    None   Social History Narrative    None     5/10/19  Patient awake and alert  Remains in ICU on low dose levophed drip  Tele NSR occ PVC  Vital signs stable  Family at bedside well informed plan of care    5/11/19  Patient transferred to [address] [place] [address]  Awake, arousable   Tele NSR occ pvc  Vital signs stable        Review of Systems:   Review of Systems   Constitutional: Positive for fatigue. Negative for diaphoresis. HENT: Negative. Eyes: Negative. Respiratory: Positive for shortness of breath. Negative for cough, chest tightness, wheezing and stridor. Cardiovascular: Negative. Negative for chest pain, palpitations and leg swelling. Gastrointestinal: Negative. Negative for blood in stool and nausea. Genitourinary: Negative. Musculoskeletal: Negative. Skin: Negative. Neurological: Positive for weakness. Negative for dizziness, syncope and light-headedness. Hematological: Negative. Psychiatric/Behavioral: Negative. Physical Examination:    BP 99/71   Pulse 96   Temp 98.1 °F (36.7 °C) (Oral)   Resp 20   Ht 4' 10\" (1.473 m)   Wt 110 lb 3.7 oz (50 kg)   SpO2 99%   BMI 23.04 kg/m²    Physical Exam   Constitutional: No distress. She appears chronically ill and acutely ill. HENT:   Normal cephalic and Atraumatic   Eyes: Pupils are equal, round, and reactive to light. Neck: Normal range of motion and thyroid normal. Neck supple. No JVD present. No neck adenopathy. No thyromegaly present. Cardiovascular: Normal rate, regular rhythm, intact distal pulses and normal pulses. Murmur heard. Pulmonary/Chest: Effort normal. She has no wheezes. She has bibasilar rales. She exhibits no tenderness. Abdominal: Soft. Bowel sounds are normal. There is no tenderness. Musculoskeletal: Normal range of motion. She exhibits no edema or tenderness. Neurological: She is alert and oriented to person, place, and time. Skin: Skin is warm. No cyanosis.  Nails show no clubbing.        LABS:  CBC:   Lab Results   Component Value Date    WBC 7.2 05/11/2019    RBC 3.92 05/11/2019    HGB 10.4 05/11/2019    HCT 31.5 05/11/2019    MCV 80.4 05/11/2019    MCH 26.6 05/11/2019    MCHC 33.1 05/11/2019    RDW 14.6 05/11/2019     05/11/2019     CBC with Differential:    Lab Results   Component Value Date    WBC 7.2 05/11/2019    RBC 3.92 05/11/2019    HGB 10.4 05/11/2019    HCT 31.5 05/11/2019     05/11/2019    MCV 80.4 05/11/2019    MCH 26.6 05/11/2019    MCHC 33.1 05/11/2019    RDW 14.6 05/11/2019    LYMPHOPCT 8.6 05/11/2019    MONOPCT 16.3 05/11/2019    BASOPCT 0.3 05/11/2019    MONOSABS 1.2 05/11/2019    LYMPHSABS 0.6 05/11/2019    EOSABS 0.1 05/11/2019    BASOSABS 0.0 05/11/2019     CMP:    Lab Results   Component Value Date     05/11/2019    K 3.9 05/11/2019    CL 91 05/11/2019    CO2 37 05/11/2019    BUN 17 05/11/2019    CREATININE 0.63 05/11/2019    GFRAA >60.0 05/11/2019    LABGLOM >60.0 05/11/2019    GLUCOSE 104 05/11/2019    PROT 4.6 05/11/2019    LABALBU 2.3 05/11/2019    CALCIUM 7.6 05/11/2019    BILITOT 0.8 05/11/2019    ALKPHOS 35 05/11/2019    AST 22 05/11/2019    ALT 11 05/11/2019     BMP:    Lab Results   Component Value Date     05/11/2019    K 3.9 05/11/2019    CL 91 05/11/2019    CO2 37 05/11/2019    BUN 17 05/11/2019    LABALBU 2.3 05/11/2019    CREATININE 0.63 05/11/2019    CALCIUM 7.6 05/11/2019    GFRAA >60.0 05/11/2019    LABGLOM >60.0 05/11/2019    GLUCOSE 104 05/11/2019     Magnesium:    Lab Results   Component Value Date    MG 2.0 05/11/2019     Troponin:    Lab Results   Component Value Date    TROPONINI <0.010 05/08/2019        Active Hospital Problems    Diagnosis Date Noted    Pleural effusion, bilateral [J90]      Priority: Low    Other ascites [R18.8]      Priority: Low    Collapse of left lung [J98.11]      Priority: Low    History of breast cancer [Z85.3]      Priority: Low    Coffee ground emesis [K92.0] Priority: Low    Hypotension [I95.9] 05/08/2019     Priority: Low    Elevated troponin [R74.8] 05/08/2019     Priority: Low    Dyspnea [R06.00] 05/08/2019     Priority: Low    Electrolyte abnormality [E87.8] 05/08/2019     Priority: Low    Non-intractable vomiting with nausea [R11.2] 05/08/2019     Priority: Low    Weakness [R53.1] 05/08/2019     Priority: Low    Sepsis ([address] [A41.9]      Priority: Low    Pneumonia [J18.9] 05/07/2019     Priority: Low        Assessment/Plan:  1. PNA- currently on IV ATB'S  2. Hypokalemia-Monitor potassium levels and keep greater than 4.0  3. hypomagnesium-Monitor magnesium levels and keep greater 2.0  4. Pulmonary HTN  5. EF Normal  6.  Cardiology will continue to follow along             Electronically signed by Karin Phan MD on 5/11/2019 at 2:57 PM

## 2019-05-11 NOTE — FLOWSHEET NOTE
Report called to [facility]. Pt to transfer after meal finished.  Electronically signed by Scott Parkinson RN on 5/11/2019 at 12:16 PM

## 2019-05-11 NOTE — FLOWSHEET NOTE
Assist return to bed.  Temp adjust per request. Electronically signed by Karolyn Urbina RN on 5/11/2019 at 11:23 AM

## 2019-05-12 NOTE — PROGRESS NOTES
Progress Note  Patient: Ana Craig  Unit/Bed: K204/T083-00  YOB: 1940  MRN: [id number]  Acct: [de-identified]   Admitting Diagnosis: Hypovolemia [E86.1]  Hypotension [I95.9]  Admit Date:  5/7/2019  Hospital Day: 5    Chief Complaint: sepsis resp failure PA HTn pna     Histories:  Past Medical History:   Diagnosis Date    Cancer [facility])     breast, radiation and mastectomy    Hyperlipidemia     Hypertension      Past Surgical History:   Procedure Laterality Date    MASTECTOMY      bilateral, first one in 1958, 2nd one few years ago    THORACENTESIS Right 05/09/2019    790 ml clear, yellow fluid removed by Dr. Mary Shows History   Problem Relation Age of Onset    No Known Problems Mother     Heart Attack Father     Heart Disease Neg Hx     Stroke Neg Hx     Cancer Neg Hx     COPD Neg Hx     Diabetes Neg Hx      Social History     Socioeconomic History    Marital status:      Spouse name: None    Number of children: None    Years of education: None    Highest education level: None   Occupational History    None   Social Needs    Financial resource strain: None    Food insecurity:     Worry: None     Inability: None    Transportation needs:     Medical: None     Non-medical: None   Tobacco Use    Smoking status: Never Smoker    Smokeless tobacco: Never Used   Substance and Sexual Activity    Alcohol use: Not Currently    Drug use: Never    Sexual activity: None   Lifestyle    Physical activity:     Days per week: None     Minutes per session: None    Stress: None   Relationships    Social connections:     Talks on phone: None     Gets together: None     Attends Orthodoxy service: None     Active member of club or organization: None     Attends meetings of clubs or organizations: None     Relationship status: None    Intimate partner violence:     Fear of current or ex partner: None     Emotionally abused: None     Physically abused: None     Forced sexual activity: None   Other Topics Concern    None   Social History Narrative    None     5/10/19  Patient awake and alert  Remains in ICU on low dose levophed drip  Tele NSR occ PVC  Vital signs stable  Family at bedside well informed plan of care    5/11/19  Patient transferred to [address] [place] [address]  Awake, arousable   Tele NSR occ pvc  Vital signs stable    5/12/19  Patient extemely shortness of breath at rest  On BIPAP  CXR left side full of fluid  Tele ST      Review of Systems:   Review of Systems   Constitutional: Positive for fatigue. Negative for diaphoresis. HENT: Negative. Eyes: Negative. Respiratory: Positive for shortness of breath. Negative for cough, chest tightness, wheezing and stridor. Cardiovascular: Negative. Negative for chest pain, palpitations and leg swelling. Gastrointestinal: Negative. Negative for blood in stool and nausea. Genitourinary: Negative. Musculoskeletal: Negative. Skin: Negative. Neurological: Positive for weakness. Negative for dizziness, syncope and light-headedness. Hematological: Negative. Psychiatric/Behavioral: Negative. Physical Examination:    BP (!) 157/73   Pulse 118   Temp 97.9 °F (36.6 °C) (Axillary)   Resp 18   Ht 4' 10\" (1.473 m)   Wt 102 lb (46.3 kg)   SpO2 100%   BMI 21.32 kg/m²    Physical Exam   Constitutional: No distress. She appears chronically ill and acutely ill. HENT:   Normal cephalic and Atraumatic   Eyes: Pupils are equal, round, and reactive to light. Neck: Normal range of motion and thyroid normal. Neck supple. No JVD present. No neck adenopathy. No thyromegaly present. Cardiovascular: Normal rate, regular rhythm, intact distal pulses and normal pulses. Murmur heard. Pulmonary/Chest: Effort normal. She has no wheezes. She has bibasilar rales. She exhibits no tenderness. Abdominal: Soft. Bowel sounds are normal. There is no tenderness. Musculoskeletal: Normal range of motion. She exhibits no edema or tenderness. Neurological: She is alert and oriented to person, place, and time. Skin: Skin is warm. No cyanosis. Nails show no clubbing.        LABS:  CBC:   Lab Results   Component Value Date    WBC 7.5 05/12/2019    RBC 3.99 05/12/2019    HGB 10.7 05/12/2019    HCT 32.4 05/12/2019    MCV 81.2 05/12/2019    MCH 26.7 05/12/2019    MCHC 32.9 05/12/2019    RDW 14.7 05/12/2019     05/12/2019     CBC with Differential:    Lab Results   Component Value Date    WBC 7.5 05/12/2019    RBC 3.99 05/12/2019    HGB 10.7 05/12/2019    HCT 32.4 05/12/2019     05/12/2019    MCV 81.2 05/12/2019    MCH 26.7 05/12/2019    MCHC 32.9 05/12/2019    RDW 14.7 05/12/2019    LYMPHOPCT 9.6 05/12/2019    MONOPCT 18.7 05/12/2019    BASOPCT 0.3 05/12/2019    MONOSABS 1.4 05/12/2019    LYMPHSABS 0.7 05/12/2019    EOSABS 0.3 05/12/2019    BASOSABS 0.0 05/12/2019     CMP:    Lab Results   Component Value Date     05/12/2019    K 4.5 05/12/2019    CL 92 05/12/2019    CO2 32 05/12/2019    BUN 16 05/12/2019    CREATININE 0.56 05/12/2019    GFRAA >60.0 05/12/2019    LABGLOM >60.0 05/12/2019    GLUCOSE 99 05/12/2019    PROT 5.0 05/12/2019    LABALBU 2.4 05/12/2019    CALCIUM 8.0 05/12/2019    BILITOT 0.7 05/12/2019    ALKPHOS 41 05/12/2019    AST 30 05/12/2019    ALT 15 05/12/2019     BMP:    Lab Results   Component Value Date     05/12/2019    K 4.5 05/12/2019    CL 92 05/12/2019    CO2 32 05/12/2019    BUN 16 05/12/2019    LABALBU 2.4 05/12/2019    CREATININE 0.56 05/12/2019    CALCIUM 8.0 05/12/2019    GFRAA >60.0 05/12/2019    LABGLOM >60.0 05/12/2019    GLUCOSE 99 05/12/2019     Magnesium:    Lab Results   Component Value Date    MG 1.8 05/12/2019     Troponin:    Lab Results   Component Value Date    TROPONINI <0.010 05/08/2019        Active Hospital Problems    Diagnosis Date Noted    Pleural effusion, bilateral [J90]     Other ascites [R18.8]     Collapse of left lung [J98.11]     History of breast cancer [Z85.3]     Noted    Pleural effusion, bilateral [J90]      Priority: Low    Other ascites [R18.8]      Priority: Low    Collapse of left lung [J98.11]      Priority: Low    History of breast cancer [Z85.3]      Priority: Low    Coffee ground emesis [K92.0]      Priority: Low    Hypotension [I95.9] 05/08/2019     Priority: Low    Elevated troponin [R74.8] 05/08/2019     Priority: Low    Dyspnea [R06.00] 05/08/2019     Priority: Low    Electrolyte abnormality [E87.8] 05/08/2019     Priority: Low    Non-intractable vomiting with nausea [R11.2] 05/08/2019     Priority: Low    Weakness [R53.1] 05/08/2019     Priority: Low    Sepsis ([address] [A41.9]      Priority: Low    Pneumonia [J18.9] 05/07/2019     Priority: Low    Acute respiratory failure with hypoxia ([address] [J96.01]         I reviewed and agree with the findings and plan documented in his note .     Impression/Plan  Large left pleural effusion- transfer to ICU       Electronically signed by Brian Piper MD on 5/12/19 at 1:08 PM

## 2019-05-12 NOTE — H&P
Patient had SVT that required 6mg adenosine to terminate. Patient continued to be tachycardic with a rate in 100's. Patient is very dry and she needs IV hydration. Will give a bolus and will continue 75cc/hr NS.      Electronically signed by Benny Car MD on 5/12/2019 at 5:10 AM

## 2019-05-12 NOTE — PROGRESS NOTES
5450 Patient went into SVT rhythm with 's. Dr. Joselyn Davis on floor. Assessed patient. A & O. Denies pain or SOB.  0443 EKG obtained. 0451 6mg Adenosine given. Patient converted to NSR . Patient tolerated well. A & O. Continued to deny pain or SOB.   0504 Hung NS bolus of 500cc. To be followed by NS continuous at 75cc/hr.

## 2019-05-12 NOTE — PROGRESS NOTES
Patient taken off Bipap, placed on 50% Venti mask. Patient did not tolerate very well. Sats in 88-90% range. After oral care and mouth moisturizer, placed back on Bipap. Reached out to physician for something to relax the patient.     Mason Grullon  5/12/2019

## 2019-05-12 NOTE — PROGRESS NOTES
Arrived from 1W to ICU in bed on monitor with NRB. Situated in ICU bed, vitals obtained family being updated on patient's condition by Dr Shoaib Moreira.     Jay Sarabia  5/12/2019

## 2019-05-12 NOTE — PROGRESS NOTES
At around 6am patient pulse ox 85% on 4l,applied 100% NRB and called resp for breathing tx.and possibly bipap application

## 2019-05-12 NOTE — PROGRESS NOTES
endometrial cavity of the uterus appears prominent. There are diffuse atherosclerotic calcifications. Mild compression deformity is seen at L4. No destructive bony lesions are seen. 1. Bilateral pleural effusions with consolidation. The right pleural effusion is larger than left and the left pleural effusion may be loculated 2. A very large volume of ascites. 3. The collecting systems of both kidneys are mildly prominent. No obstructing calcifications are nephrolithiasis seen. 4. Mild compression deformity is seen at L4 that is age indeterminate. All CT scans at this facility use dose modulation, iterative reconstruction, and/or weight based dosing when appropriate to reduce radiation dose to as low as reasonably achievable. Xr Chest Standard (2 Vw)    Addendum Date: 5/9/2019    ADDENDUM: This addendum was created on 5/9/2019 1:58 PM by Ayanna Mckeon M.D. No evidence of pneumothorax in the right chest, site of thoracentesis. Interval resolution of the previously seen right-sided pleural effusion, consistent with thoracentesis. FINDINGS: No evidence of pneumothorax in the right chest. Interval resolution of the right-sided pleural effusion consistent with thoracentesis today. Xr Chest Standard (2 Vw)    Result Date: 4/27/2019  EXAMINATION: XR CHEST (2 VW) CLINICAL HISTORY: COUGH COMPARISONS: None available. FINDINGS: Osteopenia. Thoracic kyphosis. Cardiopericardial silhouette normal. Aorta calcified and tortuous. Bilateral blunting costophrenic angles left greater than right, with ill-defined area of increased opacity bilateral lung bases, right greater than left. BILATERAL PLEURAL EFFUSIONS WITH BIBASILAR ATELECTASIS/PNEUMONIA. Ct Chest Wo Contrast    1. There is a large left pleural effusion with complete collapse of the left lung. The left mainstem bronchus is occluded as well. This may be secondary to external compression from the fluid versus internal mucous plug.  2.  There is a small right pleural effusion. Opacification of the right lung base may represent pneumonia versus compressive atelectasis. COMPARISON: CT of the abdomen dating May 7, 2019 DIAGNOSIS: Left lung collapse. COMMENTS: E86.1 Hypovolemia ICD10 TECHNIQUE:   Spiral scanning of the chest was performed without contrast as per the referring physician. CT Dose-Length Product (estimate related to radiation exposure from this exam):  161.69  mGy*cm. FINDINGS: There is a large left pleural effusion with complete collapse of the left lung. There is obstruction of the left mainstem bronchus which may be secondary to extrinsic compression from the effusion versus internal obstruction such as a mucous plug. There is a small right pleural effusion. There is right basilar opacification which may represent pneumonia versus compressive atelectasis. There is atherosclerotic calcification of the aorta and its branches. Visualized osseous structures are unremarkable. Evaluation of the mediastinum is limited secondary to lack of IV contrast and collapse of the left lung. Lymphadenopathy cannot be well evaluated for. Visualized upper abdomen demonstrates bilateral dilatation of the renal collecting systems as seen on prior study. Ct Abdomen Pelvis W Iv Contrast Additional Contrast? None    Result Date: 5/10/2019  EXAMINATION:  CT ABDOMEN AND PELVIS WITH IV CONTRAST CLINICAL HISTORY:  HISTORY OF BREAST CANCER, NEW ONSET OF ASCITES, RLQ ABDOMINAL PAIN COMPARISON:  5/7/2019 TECHNIQUE:  CT abdomen and pelvis obtained following IV injection 100 mL of Isovue-370. FINDINGS:  CT scans through the lower chest demonstrate bilateral pleural effusions with bilateral lower lobe atelectasis and pulmonary infiltrate in the RLL. Additional details may be found on the CT chest report from 5/9/2019. There is a small to moderate amount of ascites in the abdomen and pelvis. The volume of ascites has decreased since the 5/7/2019 CT abdomen.  The liver, gallbladder, spleen, pancreas and adrenal glands appear normal. There is a prominent extrarenal pelvis  in each kidney and there is right pelvocaliectasis. Also, there is bilateral hydroureters which may be secondary to the distended urinary bladder. There is a Hernandez catheter within the distended bladder and this raises the possibility of a malfunctioning  Hernandez catheter. There is no retroperitoneal adenopathy. There is atherosclerotic calcification of the abdominal aorta without an abdominal aorta aneurysm. No abdominal abscess or pneumoperitoneum. There is a nondistended stomach with an air/fluid level. There is a nondistended fluid-filled small bowel which is nonspecific or may represent an enteritis in the proper clinical setting. Only a small portion of the appendix is visualized and there is no definitive CT evidence suggesting appendicitis. There is gas and fecal matter scattered throughout the colon and no evidence of bowel obstruction. There is sigmoid colon diverticulosis without diverticulitis. CT scan of pelvis demonstrates a normal size uterus with hypodensity within the endometrial cavity and this may represent fluid within the uterine cavity. An intrauterine lesion or endometrial lesion cannot be excluded on this CT abdomen/pelvis. There is  no pelvic adenopathy. There is no inguinal hernia or adenopathy. There is an old L4 compression fracture. 1. Bilateral pleural effusions with concomitant bilateral lower lobe atelectasis and RLL pulmonary infiltrate. 2. Small to moderate amount of ascites in abdomen and pelvis. Decreased amount of ascites since 5/7/2019 CT scan. 3. Distended urinary bladder with a Hernandez catheter in place and this suggests possibly a malfunctioning Hernandez catheter. 4. Bilaterally prominent extrarenal pelves and right pelvocaliectasis. Also, there is bilateral hydroureters without visible ureteral calculi and this may be secondary to the distended urinary bladder.  5. Nondistended fluid-filled small bowel is nonspecific or may represent an enteritis in the proper clinical setting. 6. Visualized small portion of appendix appears unremarkable and there is colonic diverticulosis without diverticulitis. 7. Hypodense endometrial cavity may represent fluid within the uterine cavity but endometrial lesion not excluded. All CT scans at this facility use dose modulation, iterative reconstruction, and/or weight based dosing when appropriate to reduce radiation dose to as low as reasonably achievable. Xr Chest Portable    Result Date: 5/11/2019  Portable chest radiograph History: Respiratory failure Technique: AP portable view of the chest obtained. Comparison: Chest radiograph from May 10, 2019 50 chest from May 9, 2019 Findings: Right internal jugular central catheter remains. Atherosclerotic calcification of the thoracic aorta. Moderate bilateral pleural effusions, greater on the left, likely with adjacent atelectasis or infiltrate. No pneumothorax. Heart border is obscured by the left pleural effusion. Osseous structures appear intact. Overall no significant interval change. No significant interval change of moderate bilateral pleural effusions, left greater than right. No pneumothorax. Xr Chest Portable    Result Date: 5/10/2019  EXAMINATION: PORTABLE CHEST X-RAY FROM 5/10/2019 AT 9:36 AM CLINICAL HISTORY: STATUS POST RIGHT THORACENTESIS, EVALUATE FOR POSSIBLE POSTTHORACENTESIS PNEUMOTHORAX, FOLLOW-UP PLEURAL EFFUSIONS COMPARISONS: 5/9/2019 FINDINGS: There is no evidence of a pneumothorax following the right thoracentesis procedure. There has been a decrease in the volume of the bilateral pleural effusions. There remains a small pleural effusion blunting the right CP angle and a moderately large left pleural effusion. Suspect underlying atelectasis and/or infiltrate in the left lung. Mild cardiomegaly and atherosclerotic tortuous aorta is once again noted.  A right CVC catheter appears in satisfactory position in the SVC. Also, there is osteopenia and mild degenerative bone spurring throughout the spine. 1. NO PNEUMOTHORAX FOLLOWING THE RIGHT THORACENTESIS PROCEDURE. 2. DECREASED VOLUME OF THE BILATERAL PLEURAL EFFUSIONS SINCE PRIOR CHEST FILMS. 3. SUSPECT UNDERLYING ATELECTASIS AND/OR INFILTRATE IN THE LEFT LUNG SIMILAR TO PRIOR CXR. Xr Chest Portable    Result Date: 5/9/2019  EXAMINATION: XR CHEST, PORTABLE SINGLE VIEW: DATE AND TIME: 5/9/2019 at 12:00 AM. CLINICAL HISTORY: SHORTNESS OF BREATH. POST RIGHT THORACENTESIS. DR. Jessica Saleh TO READ. COMPARISONS: May 8, 2019. FINDINGS: CVP line unchanged in position. Large bilateral effusions with hilar haze and vascular congestion consistent with CHF. No change. FLORID PULMONARY EDEMA WITH LARGE EFFUSIONS. NO CHANGE. Xr Chest Portable    Result Date: 5/8/2019  EXAMINATION: XR CHEST PORTABLE CLINICAL HISTORY: CENTRAL LINE COMPARISONS: MAY 7, 2019, APRIL 27, 2019 FINDINGS: Right jugular vein central line with tip in right atrium. Kyphotic. Osteopenic. Blunting costophrenic angles bilaterally with increased opacities mid and lower lungs bilaterally. Pulmonary vasculature prominent, and indistinct. BILATERAL PLEURAL EFFUSIONS. BILATERAL EDEMA VERSUS ATELECTASIS/PNEUMONIA. Xr Chest Portable    Result Date: 5/8/2019  COMPARISON: None available. HISTORY:  ABDOMINAL PAIN COMMENTS:  WEAKNESS, POSSIBLE DEHYDRATION TECHNIQUE: procedure Thin slice spiral CT was performed from the lung bases through the iliac crests without contrast administration. Contrast enhancement was not employed due to clinician's order. Sagittal and coronal reconstructions were also performed. FINDINGS: Images through the lung bases demonstrate a large pleural effusion on the right with consolidation that contains air bronchograms. Smaller pleural effusion is seen on the left and there is also consolidation. The left effusion may also be loculated.  Air bronchograms are also seen in the right middle lobe. The heart is enlarged. A hiatal hernia is seen. A very large amount of ascites is seen. Non-contrast images of the liver,  pancreas, spleen and adrenals are unremarkable. Extrarenal pelvises are seen in both kidneys. The ureters appear distended. No  dilated loops of bowel or free air is seen. Diverticulosis coli is seen in the sigmoid colon without evidence for diverticulitis. The endometrial cavity of the uterus appears prominent. There are diffuse atherosclerotic calcifications. Mild compression deformity is seen at L4. No destructive bony lesions are seen. 1. Bilateral pleural effusions with consolidation. The right pleural effusion is larger than left and the left pleural effusion may be loculated 2. A very large volume of ascites. 3. The collecting systems of both kidneys are mildly prominent. No obstructing calcifications are nephrolithiasis seen. 4. Mild compression deformity is seen at L4 that is age indeterminate. All CT scans at this facility use dose modulation, iterative reconstruction, and/or weight based dosing when appropriate to reduce radiation dose to as low as reasonably achievable. Us Abdomen Limited    Result Date: 5/8/2019  EXAMINATION: US ABDOMEN LIMITED, US DUP ABD PEL RETRO SCROT COMPLETE CLINICAL HISTORY: 59-year-old with abdominal distention and new onset ascites COMPARISONS: Unenhanced CT abdomen 5/7/2019 FINDINGS: Right upper quadrant ultrasound as well as liver Doppler ultrasound were performed by a registered technologist and images submitted for evaluation. Visualized liver is normal in echogenicity without significant lobulated borders. No ultrasound  signs of intrahepatic biliary ductal dilatation or hepatic mass. Liver vascular Doppler demonstrates a mildly enlarged portal vein, measuring 1.4 cm in transverse dimension.  However spectral analysis demonstrates normal flow direction into the liver in the main portal vein, right and left portal veins and a posterior branch of the right portal vein with normal phasicity of the flow. Hepatic artery flow directionality is also normal into the liver. There is a normal appearing low resistive arterial waveform  Resistive index of the hepatic artery was not obtained on this examination. Right, mid and lower left hepatic veins demonstrate normal direction of flow into the IVC with pulsatile waveforms predominantly below baseline. Common duct in the cade hepatis is normal in caliber measuring 2 to 3 mm. Gallbladder demonstrates multiple small foci of gallbladder wall thickening suspicious for tiny polyps. No diffuse gallbladder wall thickening or mural edema. No large gallstones. Note is made of upper abdominal ascites as well as a right pleural effusion. Pancreas is mostly obscured and not well imaged on this study. UPPER ABDOMINAL ASCITES AND RIGHT PLEURAL EFFUSION. HEPATIC ECHOTEXTURE IS WITHIN NORMAL LIMITS WITH NORMAL DIRECTION OF FLOW ON LIVER VASCULAR ULTRASOUND. PROMINENT MAIN PORTAL VEIN. SMALL FOCI OF GALLBLADDER WALL THICKENING SUSPICIOUS FOR A POLYPS. OTHERWISE NEGATIVE BILIARY ULTRASOUND. NONCONTRIBUTORY PANCREATIC EVALUATION    Us Dup Abd Pel Retro Scrot Complete    Result Date: 5/8/2019  EXAMINATION: US ABDOMEN LIMITED, US DUP ABD PEL RETRO SCROT COMPLETE CLINICAL HISTORY: 40-year-old with abdominal distention and new onset ascites COMPARISONS: Unenhanced CT abdomen 5/7/2019 FINDINGS: Right upper quadrant ultrasound as well as liver Doppler ultrasound were performed by a registered technologist and images submitted for evaluation. Visualized liver is normal in echogenicity without significant lobulated borders. No ultrasound  signs of intrahepatic biliary ductal dilatation or hepatic mass. Liver vascular Doppler demonstrates a mildly enlarged portal vein, measuring 1.4 cm in transverse dimension.  However spectral analysis demonstrates normal flow direction into the liver in the main portal vein, right and DETAILED ABOVE      Results:  CBC:   Recent Labs     05/10/19  0516 05/11/19  0458 05/12/19  0600   WBC 9.4 7.2 7.5   HGB 10.9* 10.4* 10.7*   HCT 32.5* 31.5* 32.4*   MCV 80.8* 80.4* 81.2*   * 113* 139     BMP:   Recent Labs     05/10/19  0522 05/11/19  0458 05/12/19  0600    134* 134*   K 3.2* 3.9 4.5   CL 90* 91* 92*   CO2 36* 37* 32*   BUN 20 17 16   CREATININE 0.66 0.63 0.56     LIVER PROFILE:   Recent Labs     05/10/19  0522 05/11/19  0458 05/12/19  0600   AST 20 22 30   ALT 10 11 15   BILITOT 1.0* 0.8* 0.7   ALKPHOS 35* 35* 41       Assessment:  1. Acute hypoxic respiratory failure secondary to left lung collapse /effusion , on BIPAP  2. Shock, septic versus decreased active intravascular volume, improved  3. Pericardial and pleural effusion most probably related to abdominal malignancy. 4. Pneumonia  5. Acute kidney injury  improved today  6. Pulmonary hypertension. 7. Diastolic dysfunction   8. Demand ischemia  9. Mild hypokalemia     Due to respiratory distress she is on BIPAP . O2 sat is . Will transfer to ICU back . May need intubation but want to avoid since she will be difficult extubation . Will continue broad-spectrum antibiotic. Continue bronchodilator therapy with DuoNeb's 4 times a day and albuterol every 2 hours when necessary. Continue IV Zosyn. Patient has ascites and likely abdominal malignancy and pleural effusion due to abdominal malignancy. CCT 36 min . Prognosis guarded. SIGNATURE: Fransisco Atkinson MD, [facility]    I came to see family in ICU , reviewed CXR with them , they are aware about her critical condition, they would like to avoid intubation if possible , will continue BIPAP and all supportive care, intubation if needed.     Fransisco Atkinson MD.[facility] 1:56 PM

## 2019-05-12 NOTE — PROGRESS NOTES
1131    vancomycin (VANCOCIN) 750 mg in dextrose 5 % 250 mL IVPB  750 mg Intravenous Q12H Sana Moya, APRN -  mL/hr at 05/12/19 1031 750 mg at 05/12/19 1031    docusate sodium (COLACE) capsule 100 mg  100 mg Oral BID Lino Messer MD   100 mg at 05/11/19 2202    polyethylene glycol (GLYCOLAX) packet 17 g  17 g Oral Daily Lino Messer MD   17 g at 05/11/19 0835    sodium chloride flush 0.9 % injection 10 mL  10 mL Intravenous 2 times per day Sana Moya, APRN - CNP   10 mL at 05/11/19 0835    sodium chloride flush 0.9 % injection 10 mL  10 mL Intravenous PRN Sana Moya, APRN - CNP        magnesium hydroxide (MILK OF MAGNESIA) 400 MG/5ML suspension 30 mL  30 mL Oral Daily PRN Sana Moya, APRN - CNP        ondansetron (ZOFRAN) injection 4 mg  4 mg Intravenous Q6H PRN Sana Moya, APRN - CNP   4 mg at 05/08/19 0324    acetaminophen (TYLENOL) tablet 650 mg  650 mg Oral Q4H PRN Sana Moya, APRN - CNP        piperacillin-tazobactam (ZOSYN) 3.375 g in dextrose 5 % 50 mL IVPB extended infusion (mini-bag)  3.375 g Intravenous Q8H Beverly Mahan, APRN - CNP 12.5 mL/hr at 05/12/19 0902 3.375 g at 05/12/19 0902    vancomycin (VANCOCIN) intermittent dosing (placeholder)   Other RX Placeholder Sana Moya, APRN - CNP        calcium elemental (OSCAL) tablet 500 mg  500 mg Oral Daily Sana Moya, APRN - CNP   Stopped at 05/12/19 1206    vitamin D (CHOLECALCIFEROL) tablet 1,000 Units  1,000 Units Oral Daily Sana Moya, APRN - CNP   Stopped at 05/12/19 1207    potassium chloride (KLOR-CON M) extended release tablet 20 mEq  20 mEq Oral BID Sana Moya, APRN - CNP   Stopped at 05/12/19 1207    simvastatin (ZOCOR) tablet 5 mg  5 mg Oral Nightly Sana Moya, APRN - CNP   5 mg at 05/11/19 2202    aspirin chewable tablet 81 mg  81 mg Oral Daily Edenilson Ramirez DO   Stopped at 05/12/19 1206    heparin (porcine) injection 5,000 Units  5,000 Units Subcutaneous 3 times per day by Diamond Cardenas MD on 5/12/2019 at 12:45 PM

## 2019-05-12 NOTE — PROGRESS NOTES
Pharmacy Vancomycin Consult     Vancomycin Day: 4  Current Dosin mg IV q12h    Recent Labs     19  0458 19  0600   BUN 17 16       Recent Labs     19  0458 19  0600   CREATININE 0.63 0.56       Recent Labs     19  0458 19  0600   WBC 7.2 7.5       Ht Readings from Last 1 Encounters:   19 4' 10\" (1.473 m)        Wt Readings from Last 1 Encounters:   19 102 lb (46.3 kg)         Body mass index is 21.32 kg/m². CrCl cannot be calculated (Unknown ideal weight.). Trough:   Recent Labs     19  0830   VANCOTROUGH 7.4*       Assessment/Plan:  Some renal function improvement. Continue current dose, trough ordered for , appropriate for current administration schedule.  Will re-assess when lab results complete

## 2019-05-12 NOTE — PROGRESS NOTES
1930-Received report from 's. Patient is resting in bed, wearing bipap, tolerating well. Patient is alert and oriented, able to follow commands. Patient's daughter at bedside. Assessment is completed as documented. Vital signs stable. 2030-Patient taken off bipap and placed on 50%VM to take HS PO pills. Patient able to take pills with sips of gatorade. Patient noticeably short of breath off bipap. Patient placed back on bipap. 2200-NyN483%. O2 increased to 50% on bipap. Patient resting in bed with eyes closed. No apparent sign of pain or distress noted. Vital signs stable. 0000-Assessment is unchanged. Patient resting in bed with eyes closed, tolerating bipap well. No apparent sign of pain or distress noted. Vital signs stable, will continue to monitor closely. 0145-Patient found by Felix Salvador RN to have taken bipap mask off. Per Felix Salvador, patient was agonal breathing, and HR 39. Rapid response called, see code notes. By the time this RN got into the room (as soon as Rapid Response was called), patient had Bipap mask back on, and was following commands and answering all questions appropriately. Patient remains awake, alert to person, place, time and situation. Dr. Leanne Solano at bedside; Rapid response cancelled per Dr. Leanne Solano. Order received to stop IVF, give 40mg IV lasix, and obtain ABG.    0205-ABG obtained from respiratory therapist.     0250-Received order from Dr. Leanne Solano to change bipap settings to 14/6. Notified respiratory therapist of change. 0400-Assessment is unchanged. Patient continues to rest in bed with eyes closed. No apparent sign of pain or distress noted. Patient continues to tolerate bipap well. Vital signs stable, will continue to monitor closely. 0500-AM EKG done. AM labs drawn from CVC line and sent to lab. New dressing applied to CVC line. 0530-Partial bath and linen change done. Patient had large, soft, brown BM.

## 2019-05-13 PROBLEM — E43 SEVERE MALNUTRITION (HCC): Status: ACTIVE | Noted: 2019-01-01

## 2019-05-13 NOTE — PROGRESS NOTES
Nutrition Assessment    Type and Reason for Visit: Reassess    Nutrition Recommendations: Continue NPO  Diet consistency per results of swallow eval.   Begin ONS once diet advanced and monitor for acceptance    Nutrition Assessment: Severe malnutrition with > 5 days < 50% estimated needs being met, with hx of indequate oral intalke PTA. Nutritional status continues to decline,as pt rquired continuous bipap with trays being held by nursing. May require intubation if pt does not tolerate transition to high flow O2    Malnutrition Assessment:  · Malnutrition Status: Meets the criteria for severe malnutrition  · Context: Acute illness or injury  · Findings of the 6 clinical characteristics of malnutrition (Minimum of 2 out of 6 clinical characteristics is required to make the diagnosis of moderate or severe Protein Calorie Malnutrition based on AND/ASPEN Guidelines):  1. Energy Intake-Less than or equal to 50% of estimated energy requirement, Greater than or equal to 5 days    2. Weight Loss-5% loss or greater, in 1 month  3. Fat Loss-Moderate subcutaneous fat loss, Triceps  4. Muscle Loss-Moderate muscle mass loss, Clavicles (pectoralis and deltoids), Temples (temporalis muscle), Scapula (trapezius)  5. Fluid Accumulation-Moderate to severe fluid accumulation, Ascites  6.   Strength-Not measured    Nutrition Risk Level: High    Nutrient Needs:  · Estimated Daily Total Kcal: 8060-6494 (kg x 28-30)(? risk for refeeding)  · Estimated Daily Protein (g): 61-73 (kg x 1.5-1.8)  · Estimated Daily Total Fluid (ml/day): 7473-6138 (1 ml/kcal)    Nutrition Diagnosis:   · Problem: Inadequate oral intake  · Etiology: related to Impaired respiratory function-inability to consume food     Signs and symptoms:  as evidenced by Diet history of poor intake, Weight loss greater than or equal to 5% in 1 month, Moderate muscle loss, Moderate loss of subcutaneous fat    Objective Information:  · Nutrition-Focused Physical Findings: 97#  · Wound Type: None  · Current Nutrition Therapies:  · Oral Diet Orders: NPO   · Oral Diet intake: Unable to assess  · Oral Nutrition Supplement (ONS) Orders: None  · ONS intake: Unable to assess  · Anthropometric Measures:  · Ht: 4' 10\" (147.3 cm)   · Current Body Wt: 97 lb (44 kg)  · Admission Body Wt: 89 lb (40.4 kg)(? source)  · Usual Body Wt: 95 lb (43.1 kg)(4/27/19 per EMR)  · % Weight Change:  ,  previously noted 6% x 1 month, current weight increased with ascites  · Ideal Body Wt: 96 lb (43.5 kg), % Ideal Body 92%  · BMI Classification: BMI 18.5 - 24.9 Normal Weight    Nutrition Interventions:   Continue NPO(Diet consistency per results of swallow eval. Begin ONS once diet advanced and monitor for acceptance)  Continued Inpatient Monitoring, Education Not Indicated    Nutrition Evaluation:   · Evaluation: No progress toward goals   · Goals: diet advance with intake > 50%    · Monitoring: Nutrition Progression, Meal Intake, Supplement Intake, Diet Tolerance, Weight, Pertinent Labs, Chewing/Swallowing, Monitor Bowel Function      Electronically signed by Royal Chemo RD, LD on 5/13/19 at 2:55 PM

## 2019-05-13 NOTE — PROGRESS NOTES
1920 Family at bedside. Patient is calm and accepting of Hospice choice and fate. RR >40, Sa02 in 80s. Hospice aware.  called to bedside. Hospitality tray ordered by nurse. 1940 Morphine requested for comfort. Family requesting alarms be turned off. Notified they can be turned down as patient's VS baseline decreases, but not off as the nurse still needs to monitor. 2100 family refusing turns. Kd from [facility] called and established a 0930 appointment with family. Family was under impression that patient could stay in the ICU for her entire length of stay at hospice. Family educated that this Is an ICU and patients typically go to hospice or home with hospice for the duration of stay. Patient appears comfortable  0200 spontaneous nosebleed left nare. Oxygen source switched to a non-rebreather for now. sa02 is 96% and better than it was on high flow. 210 bleeding stopped. nonrebreather still on for now     0345 patient bathed. Refused EKG. States \" give me morphine and just make me comfortable\".    0530 Refused ABG

## 2019-05-13 NOTE — FLOWSHEET NOTE
Dr. Miranda Agent in to speak with pt and family at length about pts prognosis and code status. Pt told Bishop Pereyra she did not want intubated and did not want any life saving measures. She and her family is agreeable to hospice. pts code status changed to [facility] and stat hospice consult placed. Dr. Kori Fragoso removed pt from full face bipap and placed pt on high flow oxygen.

## 2019-05-13 NOTE — PROGRESS NOTES
Physician Progress Note    2019   1:42 PM    Name:  Murray Johnson  MRN:    [id number]     IP Day: 6     Admit Date: 2019 11:47 PM  PCP: Mone Dela Cruz MD    Code Status:  Full Code    Subjective:      no new events. On high flow nasal canula. Asking about diet. Physical Examination:      Vitals:  /62   Pulse 99   Temp 97.9 °F (36.6 °C) (Axillary)   Resp (!) 32   Ht 4' 10\" (1.473 m)   Wt 97 lb 10.6 oz (44.3 kg)   SpO2 98%   BMI 20.41 kg/m²   Temp (24hrs), Av.9 °F (36.6 °C), Min:97.8 °F (36.6 °C), Max:97.9 °F (36.6 °C)      General appearance: alert, cooperative and no distress.  Very thin    Mental Status: oriented to person, place and time and normal affect  Lungs: clear to auscultation bilaterally, normal effort  Heart: regular rate and rhythm, no murmur  Abdomen: soft, nontender, mild distension, no significant TTP, bowel sounds present, no masses  Extremities: no edema, redness, tenderness in the calves  Skin: no gross lesions, rashes    Data:     Labs:  Recent Labs     19  0600 19  0203 19  0519   WBC 7.5  --  4.9   HGB 10.7* 10.8* 9.9*     --  138     Recent Labs     19  0600 19  0203 19  0517   *  --  137   K 4.5  --  4.2   CL 92*  --  94*   CO2 32*  --  29   BUN 16  --  13   CREATININE 0.56 0.9 0.61   GLUCOSE 99  --  109*     Recent Labs     19  0600 19  0517   AST 30 41*   ALT 15 20   BILITOT 0.7 0.7   ALKPHOS 41 50       Current Facility-Administered Medications   Medication Dose Route Frequency Provider Last Rate Last Dose    [START ON 2019] vancomycin (VANCOCIN) 750 mg in dextrose 5 % 250 mL IVPB  750 mg Intravenous Q18H Liss Xiong MD        ALPRAZolam (XANAX) tablet 0.25 mg  0.25 mg Oral Nightly PRN Luciano Ortiz MD        albuterol (PROVENTIL) nebulizer solution 2.5 mg  2.5 mg Nebulization Q2H PRN Regina Sanchez MD        ipratropium-albuterol (DUONEB) nebulizer solution 1 ampule  1 ampule Inhalation 4x daily Key Vang MD   1 ampule at 05/13/19 1055    docusate sodium (COLACE) capsule 100 mg  100 mg Oral BID Mauro Mejia MD   100 mg at 05/12/19 2032    polyethylene glycol (GLYCOLAX) packet 17 g  17 g Oral Daily Mauro Mejia MD   17 g at 05/11/19 0835    sodium chloride flush 0.9 % injection 10 mL  10 mL Intravenous 2 times per day MITCH Looney CNP   10 mL at 05/13/19 0944    sodium chloride flush 0.9 % injection 10 mL  10 mL Intravenous PRN MITCH Looney CNP        magnesium hydroxide (MILK OF MAGNESIA) 400 MG/5ML suspension 30 mL  30 mL Oral Daily PRN MITCH Looney CNP        ondansetron (ZOFRAN) injection 4 mg  4 mg Intravenous Q6H PRN MITCH Looney CNP   4 mg at 05/08/19 0324    acetaminophen (TYLENOL) tablet 650 mg  650 mg Oral Q4H PRN MITCH Looney CNP        piperacillin-tazobactam (ZOSYN) 3.375 g in dextrose 5 % 50 mL IVPB extended infusion (mini-bag)  3.375 g Intravenous Q8H BeverlyMITCH Sharp CNP 12.5 mL/hr at 05/13/19 0944 3.375 g at 05/13/19 0944    vancomycin (VANCOCIN) intermittent dosing (placeholder)   Other RX Placeholder MITCH Looney CNP        calcium elemental (OSCAL) tablet 500 mg  500 mg Oral Daily MITCH Looney CNP   Stopped at 05/12/19 1206    vitamin D (CHOLECALCIFEROL) tablet 1,000 Units  1,000 Units Oral Daily MITCH Looney CNP   Stopped at 05/12/19 1207    potassium chloride (KLOR-CON M) extended release tablet 20 mEq  20 mEq Oral BID MITCH Looney CNP   20 mEq at 05/12/19 2032    simvastatin (ZOCOR) tablet 5 mg  5 mg Oral Nightly MITCH Looney CNP   5 mg at 05/12/19 2032    aspirin chewable tablet 81 mg  81 mg Oral Daily Francy Huddleston DO   Stopped at 05/12/19 1206    heparin (porcine) injection 5,000 Units  5,000 Units Subcutaneous 3 times per day Francy Huddleston DO   5,000 Units at 05/13/19 8871    pantoprazole (PROTONIX) injection 40 mg  40 mg Intravenous BID Robi Damon MD   40 mg at 05/13/19 2270    And    sodium chloride (PF) 0.9 % injection 10 mL  10 mL Intravenous BID Robi Damon MD   10 mL at 05/13/19 0944    lidocaine 2 % injection 5 mL  5 mL Intradermal PRN MITCH Amaya - CNP         Assessment and Plan:          Acute Hospital issues:    # shock POA- septic vs hypovolemic- due to PNA, poor oral intake, ruled out SBP, right sided heart failure  - has massive ascites and bilateral pleural effusion. S/p paracentesis,  fluids negative for malignacy but suspicion is still there. - resume vancomycin and zosyn   - resume Levophed to keep MAP>65, avoid hypotension   - concern for empyema, ?chest tube may be needed as per pulm. - intubate if worse       # generalized weakness   - due to above     #  PNA POA   - resume abx     # ascites- new onset. Due to right sided heart failure vs malignancy   - ruled out portal vein thrombosis liver US with doppler     # LE edema   - DVT scan negative     # concern for GI bleed- positive occult stool   - monitor H/H, PPI, hold SQ heparin and asa   - GI consult- deferred intervention unless for active bleeding     # HTN/ HLD  - resume statin.  Hold anti-hypertensive meds      DVT/PPx- SCD for now       DISCHARGE PLANNING  ICU        Electronically signed by Donovan Andrew DO on 5/13/2019 at 1:42 PM

## 2019-05-13 NOTE — PROGRESS NOTES
Progress Note  Patient: Marcelo Semen  Unit/Bed: IC07/IC07-01  YOB: 1940  MRN: [id number]  Acct: [de-identified]   Admitting Diagnosis: Hypovolemia [E86.1]  Hypotension [I95.9]  Admit Date:  5/7/2019  Hospital Day: 6    Chief Complaint: sepsis PNA resp failure PSVT     Histories:  Past Medical History:   Diagnosis Date    Cancer [facility])     breast, radiation and mastectomy    Hyperlipidemia     Hypertension      Past Surgical History:   Procedure Laterality Date    MASTECTOMY      bilateral, first one in 1958, 2nd one few years ago    THORACENTESIS Right 05/09/2019    790 ml clear, yellow fluid removed by Dr. Елена Busch History   Problem Relation Age of Onset    No Known Problems Mother     Heart Attack Father     Heart Disease Neg Hx     Stroke Neg Hx     Cancer Neg Hx     COPD Neg Hx     Diabetes Neg Hx      Social History     Socioeconomic History    Marital status:      Spouse name: None    Number of children: None    Years of education: None    Highest education level: None   Occupational History    None   Social Needs    Financial resource strain: None    Food insecurity:     Worry: None     Inability: None    Transportation needs:     Medical: None     Non-medical: None   Tobacco Use    Smoking status: Never Smoker    Smokeless tobacco: Never Used   Substance and Sexual Activity    Alcohol use: Not Currently    Drug use: Never    Sexual activity: None   Lifestyle    Physical activity:     Days per week: None     Minutes per session: None    Stress: None   Relationships    Social connections:     Talks on phone: None     Gets together: None     Attends Jew service: None     Active member of club or organization: None     Attends meetings of clubs or organizations: None     Relationship status: None    Intimate partner violence:     Fear of current or ex partner: None     Emotionally abused: None     Physically abused: None     Forced sexual activity: None ground emesis [K92.0]      Priority: Low    Hypotension [I95.9] 05/08/2019     Priority: Low    Elevated troponin [R74.8] 05/08/2019     Priority: Low    Dyspnea [R06.00] 05/08/2019     Priority: Low    Electrolyte abnormality [E87.8] 05/08/2019     Priority: Low    Non-intractable vomiting with nausea [R11.2] 05/08/2019     Priority: Low    Weakness [R53.1] 05/08/2019     Priority: Low    Sepsis ([facility] [address] [A41.9]      Priority: Low    Pneumonia [J18.9] 05/07/2019     Priority: Low        Assessment/Plan:  1. Resp distress/PNA- on RX and ABX. On High Flow O2  2. Hypotension -stable off pressors  3. PSVT - stable   4. Pleural Effusion and ascites possibly related to malignancy. S/p Paracentesis and Thoracentesis. Bronch - path pending. 5. DOT- resolved   6. Echo EF 65% with severe PA HTN RVSP ~ 70 mmHg  7. Supportive care.               Electronically signed by Hugh Pro MD on 5/13/2019 at 10:54 AM

## 2019-05-13 NOTE — FLOWSHEET NOTE
Faizan Suazo from hospice called in and states that she is on call until midnight and she is out in the community on another call and she will do her best to be here by midnight.   Family aware

## 2019-05-13 NOTE — FLOWSHEET NOTE
Dr. White Revering in to speak with pt and family at length about pts resp status and chest xray results and code status and prognosis. familt tearful. Reassurance provided. Pt remains on full face bipap.   Family states they will discuss with each other and pts other two daughter's when they arrive pts code staus and decide whether pt will be made a [facility] and whether they will ask for a hospice referral

## 2019-05-13 NOTE — FLOWSHEET NOTE
Pt placed on bedpan. Pt had moderate soft brown bm. Pt sob with exertion. Pt cleansed and repositioned to right side. Pt remains on full face bipap.  remains at bedside. Update given to  about pt sats dropping during the night and about pt remaining on BIPAP continuous and pt now remaining NPO until resp status improves.

## 2019-05-13 NOTE — FLOWSHEET NOTE
Dr. Francesca Bearden here to see pt. Pt continues to tolerate high flow oxygen.   Will continue to monitor

## 2019-05-13 NOTE — PROGRESS NOTES
Physical Therapy Missed Treatment   Facility/Department: [facility] MED SURG IC07/IC07-01    NAME: Leonardo Lagunas    : 1940 (66 y.o.)  MRN: [id number]    Account: [de-identified]  Gender: female         [x] Patient Unavailable: hold per nursing, patient with difficulty breathing this PM.     Will attempt PT treatment again at earliest convenience.         Electronically signed by Naty Sandhu PTA on 19 at 2:10 PM

## 2019-05-13 NOTE — PROGRESS NOTES
Administered: Puree;Nectar - teaspoon;Honey - teaspoon;Nectar - cup    Current Diet level:  Current Diet : NPO  Current Liquid Diet : NPO    Oral Motor Deficits  Oral/Motor  Oral Motor: Within functional limits    Oral Phase Dysfunction  Oral Phase  Oral Phase: WFL  Oral Phase  Oral Phase - Comment: Functional oral stage of swallow with appropriate bolus acceptance from spoon and cup and functional A-P transit for consistencies tested. Indicators of Pharyngeal Phase Dysfunction   Pharyngeal Phase  Pharyngeal Phase: Exceptions  Indicators of Pharyngeal Phase Dysfunction  Delayed Swallow: Nectar - cup  Pharyngeal Phase   Pharyngeal: Suspect mild pharyngeal dysphagia characterized by swallow delay of 2-3 seconds with nectar from cup only. Pt demonstrates adequate laryngeal elevation and no overt s/s of aspiration occurred. SpO2 dropped to 89 for approximately 3 seconds during exam.  Otherwise remained between 90-95. Nursing informed. Impression  Dysphagia Diagnosis: Mild pharyngeal stage dysphagia  Dysphagia Outcome Severity Scale: Level 5: Mild dysphagia- Distant supervision. May need one diet consistency restricted     Treatment Plan  Requires SLP Intervention: Yes  Duration/Frequency of Treatment: 2-3x/week during LOS          Treatment/Goals  Short-term Goals  Timeframe for Short-term Goals: 1 week  Goal 1: Pt will tolerate puree diet with nectar thick liquids with no overt s/s of aspiration or changes in vital signs. Goal 2: Pt will tolerate MBS exam, as able.; add goals accordingly. Long-term Goals  Timeframe for Long-term Goals: 1 week  Goal 1: Pt will tolerate least restrictive diet with no overt s/s of aspiration. Prognosis  Prognosis  Prognosis for safe diet advancement: fair  Barriers to reach goals: other (comment)  Barriers/Prognosis Comment: respiratory status  Individuals consulted  Consulted and agree with results and recommendations: Patient; Family member  Family member consulted: spouse and daughter   Discussed with Shira Figueroa RN    Education  Patient Education: Educated pt and family regarding results. Patient Education Response: Verbalizes understanding  Safety Devices in place: Yes  Type of devices: Bed alarm in place;Call light within reach    Pain:  Pain Assessment  Patient Currently in Pain: Denies         Therapy Time  SLP Individual Minutes  Time In: 1530  Time Out: [address]  Minutes: [address]  Reddy Kirkland, Date: 5/13/2019, Time: 4:01 PM

## 2019-05-13 NOTE — PROGRESS NOTES
thoracentesis. FINDINGS: No evidence of pneumothorax in the right chest. Interval resolution of the right-sided pleural effusion consistent with thoracentesis today. Xr Chest Standard (2 Vw)    Result Date: 4/27/2019  EXAMINATION: XR CHEST (2 VW) CLINICAL HISTORY: COUGH COMPARISONS: None available. FINDINGS: Osteopenia. Thoracic kyphosis. Cardiopericardial silhouette normal. Aorta calcified and tortuous. Bilateral blunting costophrenic angles left greater than right, with ill-defined area of increased opacity bilateral lung bases, right greater than left. BILATERAL PLEURAL EFFUSIONS WITH BIBASILAR ATELECTASIS/PNEUMONIA. Ct Chest Wo Contrast    1. There is a large left pleural effusion with complete collapse of the left lung. The left mainstem bronchus is occluded as well. This may be secondary to external compression from the fluid versus internal mucous plug. 2.  There is a small right pleural effusion. Opacification of the right lung base may represent pneumonia versus compressive atelectasis. COMPARISON: CT of the abdomen dating May 7, 2019 DIAGNOSIS: Left lung collapse. COMMENTS: E86.1 Hypovolemia ICD10 TECHNIQUE:   Spiral scanning of the chest was performed without contrast as per the referring physician. CT Dose-Length Product (estimate related to radiation exposure from this exam):  161.69  mGy*cm. FINDINGS: There is a large left pleural effusion with complete collapse of the left lung. There is obstruction of the left mainstem bronchus which may be secondary to extrinsic compression from the effusion versus internal obstruction such as a mucous plug. There is a small right pleural effusion. There is right basilar opacification which may represent pneumonia versus compressive atelectasis. There is atherosclerotic calcification of the aorta and its branches. Visualized osseous structures are unremarkable.  Evaluation of the mediastinum is limited secondary to lack of IV contrast and collapse of the left lung. Lymphadenopathy cannot be well evaluated for. Visualized upper abdomen demonstrates bilateral dilatation of the renal collecting systems as seen on prior study. Ct Abdomen Pelvis W Iv Contrast Additional Contrast? None    Result Date: 5/10/2019  EXAMINATION:  CT ABDOMEN AND PELVIS WITH IV CONTRAST CLINICAL HISTORY:  HISTORY OF BREAST CANCER, NEW ONSET OF ASCITES, RLQ ABDOMINAL PAIN COMPARISON:  5/7/2019 TECHNIQUE:  CT abdomen and pelvis obtained following IV injection 100 mL of Isovue-370. FINDINGS:  CT scans through the lower chest demonstrate bilateral pleural effusions with bilateral lower lobe atelectasis and pulmonary infiltrate in the RLL. Additional details may be found on the CT chest report from 5/9/2019. There is a small to moderate amount of ascites in the abdomen and pelvis. The volume of ascites has decreased since the 5/7/2019 CT abdomen. The liver, gallbladder, spleen, pancreas and adrenal glands appear normal. There is a prominent extrarenal pelvis  in each kidney and there is right pelvocaliectasis. Also, there is bilateral hydroureters which may be secondary to the distended urinary bladder. There is a Hernandez catheter within the distended bladder and this raises the possibility of a malfunctioning  Hernandez catheter. There is no retroperitoneal adenopathy. There is atherosclerotic calcification of the abdominal aorta without an abdominal aorta aneurysm. No abdominal abscess or pneumoperitoneum. There is a nondistended stomach with an air/fluid level. There is a nondistended fluid-filled small bowel which is nonspecific or may represent an enteritis in the proper clinical setting. Only a small portion of the appendix is visualized and there is no definitive CT evidence suggesting appendicitis. There is gas and fecal matter scattered throughout the colon and no evidence of bowel obstruction. There is sigmoid colon diverticulosis without diverticulitis.  CT scan of pelvis demonstrates a normal size uterus with hypodensity within the endometrial cavity and this may represent fluid within the uterine cavity. An intrauterine lesion or endometrial lesion cannot be excluded on this CT abdomen/pelvis. There is  no pelvic adenopathy. There is no inguinal hernia or adenopathy. There is an old L4 compression fracture. 1. Bilateral pleural effusions with concomitant bilateral lower lobe atelectasis and RLL pulmonary infiltrate. 2. Small to moderate amount of ascites in abdomen and pelvis. Decreased amount of ascites since 5/7/2019 CT scan. 3. Distended urinary bladder with a Hernandez catheter in place and this suggests possibly a malfunctioning Hernandez catheter. 4. Bilaterally prominent extrarenal pelves and right pelvocaliectasis. Also, there is bilateral hydroureters without visible ureteral calculi and this may be secondary to the distended urinary bladder. 5. Nondistended fluid-filled small bowel is nonspecific or may represent an enteritis in the proper clinical setting. 6. Visualized small portion of appendix appears unremarkable and there is colonic diverticulosis without diverticulitis. 7. Hypodense endometrial cavity may represent fluid within the uterine cavity but endometrial lesion not excluded. All CT scans at this facility use dose modulation, iterative reconstruction, and/or weight based dosing when appropriate to reduce radiation dose to as low as reasonably achievable. Xr Chest Portable    Result Date: 5/12/2019  Portable chest radiograph History: Acute respiratory failure. Severe shortness of breath. Technique: AP portable view of the chest obtained. Comparison: Portable chest radiograph from May 11, 2019 and May 10, 2019 Findings: Right-sided central venous catheter remains. Atherosclerotic calcification of the thoracic aorta. Interval increase in size of left-sided pleural effusion likely with adjacent atelectasis, resulting in near complete opacification of the left hemithorax.  Small seen in both kidneys. The ureters appear distended. No  dilated loops of bowel or free air is seen. Diverticulosis coli is seen in the sigmoid colon without evidence for diverticulitis. The endometrial cavity of the uterus appears prominent. There are diffuse atherosclerotic calcifications. Mild compression deformity is seen at L4. No destructive bony lesions are seen. 1. Bilateral pleural effusions with consolidation. The right pleural effusion is larger than left and the left pleural effusion may be loculated 2. A very large volume of ascites. 3. The collecting systems of both kidneys are mildly prominent. No obstructing calcifications are nephrolithiasis seen. 4. Mild compression deformity is seen at L4 that is age indeterminate. All CT scans at this facility use dose modulation, iterative reconstruction, and/or weight based dosing when appropriate to reduce radiation dose to as low as reasonably achievable. Us Abdomen Limited    Result Date: 5/8/2019  EXAMINATION: US ABDOMEN LIMITED, US DUP ABD PEL RETRO SCROT COMPLETE CLINICAL HISTORY: 77-year-old with abdominal distention and new onset ascites COMPARISONS: Unenhanced CT abdomen 5/7/2019 FINDINGS: Right upper quadrant ultrasound as well as liver Doppler ultrasound were performed by a registered technologist and images submitted for evaluation. Visualized liver is normal in echogenicity without significant lobulated borders. No ultrasound  signs of intrahepatic biliary ductal dilatation or hepatic mass. Liver vascular Doppler demonstrates a mildly enlarged portal vein, measuring 1.4 cm in transverse dimension. However spectral analysis demonstrates normal flow direction into the liver in the main portal vein, right and left portal veins and a posterior branch of the right portal vein with normal phasicity of the flow. Hepatic artery flow directionality is also normal into the liver.  There is a normal appearing low resistive arterial waveform  Resistive index of the hepatic artery was not obtained on this examination. Right, mid and lower left hepatic veins demonstrate normal direction of flow into the IVC with pulsatile waveforms predominantly below baseline. Common duct in the cade hepatis is normal in caliber measuring 2 to 3 mm. Gallbladder demonstrates multiple small foci of gallbladder wall thickening suspicious for tiny polyps. No diffuse gallbladder wall thickening or mural edema. No large gallstones. Note is made of upper abdominal ascites as well as a right pleural effusion. Pancreas is mostly obscured and not well imaged on this study. UPPER ABDOMINAL ASCITES AND RIGHT PLEURAL EFFUSION. HEPATIC ECHOTEXTURE IS WITHIN NORMAL LIMITS WITH NORMAL DIRECTION OF FLOW ON LIVER VASCULAR ULTRASOUND. PROMINENT MAIN PORTAL VEIN. SMALL FOCI OF GALLBLADDER WALL THICKENING SUSPICIOUS FOR A POLYPS. OTHERWISE NEGATIVE BILIARY ULTRASOUND. NONCONTRIBUTORY PANCREATIC EVALUATION    Us Dup Abd Pel Retro Scrot Complete    Result Date: 5/8/2019  EXAMINATION: US ABDOMEN LIMITED, US DUP ABD PEL RETRO SCROT COMPLETE CLINICAL HISTORY: 71-year-old with abdominal distention and new onset ascites COMPARISONS: Unenhanced CT abdomen 5/7/2019 FINDINGS: Right upper quadrant ultrasound as well as liver Doppler ultrasound were performed by a registered technologist and images submitted for evaluation. Visualized liver is normal in echogenicity without significant lobulated borders. No ultrasound  signs of intrahepatic biliary ductal dilatation or hepatic mass. Liver vascular Doppler demonstrates a mildly enlarged portal vein, measuring 1.4 cm in transverse dimension. However spectral analysis demonstrates normal flow direction into the liver in the main portal vein, right and left portal veins and a posterior branch of the right portal vein with normal phasicity of the flow. Hepatic artery flow directionality is also normal into the liver.  There is a normal appearing low resistive arterial waveform  Resistive index of the hepatic artery was not obtained on this examination. Right, mid and lower left hepatic veins demonstrate normal direction of flow into the IVC with pulsatile waveforms predominantly below baseline. Common duct in the caed hepatis is normal in caliber measuring 2 to 3 mm. Gallbladder demonstrates multiple small foci of gallbladder wall thickening suspicious for tiny polyps. No diffuse gallbladder wall thickening or mural edema. No large gallstones. Note is made of upper abdominal ascites as well as a right pleural effusion. Pancreas is mostly obscured and not well imaged on this study. UPPER ABDOMINAL ASCITES AND RIGHT PLEURAL EFFUSION. HEPATIC ECHOTEXTURE IS WITHIN NORMAL LIMITS WITH NORMAL DIRECTION OF FLOW ON LIVER VASCULAR ULTRASOUND. PROMINENT MAIN PORTAL VEIN. SMALL FOCI OF GALLBLADDER WALL THICKENING SUSPICIOUS FOR A POLYPS. OTHERWISE NEGATIVE BILIARY ULTRASOUND. NONCONTRIBUTORY PANCREATIC EVALUATION    Us Dup Lower Extremities Bilateral Venous    Result Date: 5/8/2019  EXAMINATION: US DUP LOWER EXTREMITIES BILATERAL VENOUS CLINICAL HISTORY: 75-year-old with bilateral lower extremity swelling COMPARISONS: None available. FINDINGS: Duplex and color Doppler ultrasounds were performed of the bilateral lower extremity deep venous systems. Visualized portions of both common femoral veins, femoral veins, and popliteal veins demonstrate satisfactory compression, color flow, and  augmentation. Segmental views of posterior tibial and peroneal deep calf veins also demonstrate satisfactory compression and color flow. Deep calf veins are incompletely imaged in their entirety. NO ULTRASOUND SIGNS OF THROMBUS IN THE BILATERAL LOWER EXTREMITY DEEP VENOUS SYSTEMS AS DETAILED ABOVE            IMPRESSION AND SUGGESTION:  1. Acute hypoxic and hypercarbic respiratory failure requiring noninvasive ventilatory support  2.  Bilateral pleural effusions, suspicious for malignancy, interested in comfort measures at this point, they are waiting for the other daughter to arrive before making final decision to proceed with hospice care      Electronically signed by Andrzej Alicea MD, FCCP on 5/13/2019 at 12:19 PM

## 2019-05-13 NOTE — PLAN OF CARE
Nutrition Problem: Inadequate oral intake  Intervention: Food and/or Nutrient Delivery: Continue NPO(Diet consistency per results of swallow eval. Begin ONS once diet advanced and monitor for acceptance)  Nutritional Goals: diet advance with intake > 50%

## 2019-05-14 PROBLEM — J96.21 ACUTE AND CHRONIC RESPIRATORY FAILURE WITH HYPOXIA (HCC): Status: ACTIVE | Noted: 2019-01-01

## 2019-05-14 NOTE — PROGRESS NOTES
Physical Therapy Missed Treatment   Facility/Department: [facility] IC07/IC07-01    NAME: Lili Reyez  Patient Status:   : 1940 (66 y.o.)  MRN: [id number]  Account: [de-identified]  Gender: female        [] Patient Declines PT Treatment            [x] Patient Unavailable:       Pt having hospice consult. Do not treat per RN.     Electronically signed by Alisa Hatfield PTA on 19 at 10:03 AM

## 2019-05-14 NOTE — PROGRESS NOTES
Progress Note  Patient: Jessica Fournier  Unit/Bed: IC07/IC07-01  YOB: 1940  MRN: [id number]  Acct: [de-identified]   Admitting Diagnosis: Hypovolemia [E86.1]  Hypotension [I95.9]  Admit Date:  5/7/2019  Hospital Day: 7    Chief Complaint: sepsis pna resp failure psvt    Histories:  Past Medical History:   Diagnosis Date    Cancer [facility])     breast, radiation and mastectomy    Hyperlipidemia     Hypertension      Past Surgical History:   Procedure Laterality Date    MASTECTOMY      bilateral, first one in 1958, 2nd one few years ago    THORACENTESIS Right 05/09/2019    790 ml clear, yellow fluid removed by Dr. Cosme Flores History   Problem Relation Age of Onset    No Known Problems Mother     Heart Attack Father     Heart Disease Neg Hx     Stroke Neg Hx     Cancer Neg Hx     COPD Neg Hx     Diabetes Neg Hx      Social History     Socioeconomic History    Marital status:      Spouse name: None    Number of children: None    Years of education: None    Highest education level: None   Occupational History    None   Social Needs    Financial resource strain: None    Food insecurity:     Worry: None     Inability: None    Transportation needs:     Medical: None     Non-medical: None   Tobacco Use    Smoking status: Never Smoker    Smokeless tobacco: Never Used   Substance and Sexual Activity    Alcohol use: Not Currently    Drug use: Never    Sexual activity: None   Lifestyle    Physical activity:     Days per week: None     Minutes per session: None    Stress: None   Relationships    Social connections:     Talks on phone: None     Gets together: None     Attends Christianity service: None     Active member of club or organization: None     Attends meetings of clubs or organizations: None     Relationship status: None    Intimate partner violence:     Fear of current or ex partner: None     Emotionally abused: None     Physically abused: None     Forced sexual activity: None Other Topics Concern    None   Social History Narrative    None       Subjective/HPI    EKG:        Review of Systems:   Review of Systems   Constitutional: Negative. Negative for diaphoresis and fatigue. HENT: Negative. Eyes: Negative. Respiratory: Positive for shortness of breath and wheezing. Negative for cough, chest tightness and stridor. Cardiovascular: Negative. Negative for chest pain, palpitations and leg swelling. Gastrointestinal: Negative. Negative for blood in stool and nausea. Genitourinary: Negative. Musculoskeletal: Negative. Skin: Negative. Neurological: Positive for weakness. Negative for dizziness, syncope and light-headedness. Hematological: Negative. Psychiatric/Behavioral: Negative. Physical Examination:    BP (!) 146/74   Pulse 111   Temp 97.8 °F (36.6 °C) (Axillary)   Resp 28   Ht 4' 10\" (1.473 m)   Wt 97 lb 10.6 oz (44.3 kg)   SpO2 90%   BMI 20.41 kg/m²    Physical Exam   Constitutional: She appears cachectic. No distress. She appears acutely ill. HENT:   Normal cephalic and Atraumatic   Eyes: Pupils are equal, round, and reactive to light. Neck: Normal range of motion and thyroid normal. Neck supple. No JVD present. No neck adenopathy. No thyromegaly present. Cardiovascular: Normal rate, regular rhythm, normal heart sounds, intact distal pulses and normal pulses. Pulmonary/Chest: Effort normal and breath sounds normal. She has no wheezes. She has no rales. She exhibits no tenderness. Abdominal: Soft. Bowel sounds are normal. There is no tenderness. Musculoskeletal: Normal range of motion. She exhibits no edema or tenderness. Neurological: She is alert and oriented to person, place, and time. Skin: Skin is warm. No cyanosis. Nails show no clubbing.        LABS:  CBC:   Lab Results   Component Value Date    WBC 4.7 05/14/2019    RBC 4.01 05/14/2019    HGB 10.5 05/14/2019    HCT 33.1 05/14/2019    MCV 82.4 05/14/2019    MCH 26.3 05/14/2019    MCHC 31.9 05/14/2019    RDW 14.8 05/14/2019     05/14/2019     CBC with Differential:    Lab Results   Component Value Date    WBC 4.7 05/14/2019    RBC 4.01 05/14/2019    HGB 10.5 05/14/2019    HCT 33.1 05/14/2019     05/14/2019    MCV 82.4 05/14/2019    MCH 26.3 05/14/2019    MCHC 31.9 05/14/2019    RDW 14.8 05/14/2019    LYMPHOPCT 6.4 05/14/2019    MONOPCT 15.1 05/14/2019    BASOPCT 0.6 05/14/2019    MONOSABS 0.7 05/14/2019    LYMPHSABS 0.3 05/14/2019    EOSABS 0.1 05/14/2019    BASOSABS 0.0 05/14/2019     CMP:    Lab Results   Component Value Date     05/14/2019    K 3.8 05/14/2019    CL 93 05/14/2019    CO2 32 05/14/2019    BUN 16 05/14/2019    CREATININE 0.69 05/14/2019    GFRAA >60.0 05/14/2019    LABGLOM >60.0 05/14/2019    GLUCOSE 129 05/14/2019    PROT 5.1 05/14/2019    LABALBU 2.7 05/14/2019    CALCIUM 8.1 05/14/2019    BILITOT 0.5 05/14/2019    ALKPHOS 51 05/14/2019    AST 41 05/14/2019    ALT 20 05/14/2019     BMP:    Lab Results   Component Value Date     05/14/2019    K 3.8 05/14/2019    CL 93 05/14/2019    CO2 32 05/14/2019    BUN 16 05/14/2019    LABALBU 2.7 05/14/2019    CREATININE 0.69 05/14/2019    CALCIUM 8.1 05/14/2019    GFRAA >60.0 05/14/2019    LABGLOM >60.0 05/14/2019    GLUCOSE 129 05/14/2019     Magnesium:    Lab Results   Component Value Date    MG 1.7 05/14/2019     Troponin:    Lab Results   Component Value Date    TROPONINI <0.010 05/08/2019        Active Hospital Problems    Diagnosis Date Noted    Severe malnutrition ([facility] [address] [E43] 05/13/2019     Priority: Low    Acute respiratory failure with hypoxia and hypercapnia (HCC) [J96.01, J96.02]      Priority: Low    Acute respiratory failure with hypoxia (HCC) [J96.01]      Priority: Low    Pleural effusion, bilateral [J90]      Priority: Low    Other ascites [R18.8]      Priority: Low    Collapse of left lung [J98.11]      Priority: Low    History of breast cancer [Z85.3]      Priority: Low    Coffee ground emesis [K92.0]      Priority: Low    Hypotension [I95.9] 05/08/2019     Priority: Low    Elevated troponin [R74.8] 05/08/2019     Priority: Low    Dyspnea [R06.00] 05/08/2019     Priority: Low    Electrolyte abnormality [E87.8] 05/08/2019     Priority: Low    Non-intractable vomiting with nausea [R11.2] 05/08/2019     Priority: Low    Weakness [R53.1] 05/08/2019     Priority: Low    Sepsis ([facility] [address] [A41.9]      Priority: Low    Pneumonia [J18.9] 05/07/2019     Priority: Low        Assessment/Plan:  1. Resp distress/PNA- on RX and ABX. On High Flow O2  2. Hypotension -stable off pressors  3. PSVT - stable   4. Pleural Effusion and ascites possibly related to malignancy. S/p Paracentesis and Thoracentesis. Bronch - path pending. 5. DOT- resolved   6. Echo EF 65% with severe PA HTN RVSP ~ 70 mmHg  7. Supportive care. 8. Pt and family thinking about Hospice      1.         Electronically signed by Roshan Gama MD on 5/14/2019 at 2:06 PM

## 2019-05-14 NOTE — DISCHARGE SUMMARY
Hospital Medicine Discharge Summary    Shaunna Joaquin  :  1940  MRN:  [id number]    Admit date:  2019  Discharge date:  2019    Admitting Physician: Violette Coon MD  Primary Care Physician:  Brayan Naranjo MD      Discharge Diagnoses: Active Problems:    Pneumonia    Hypotension    Elevated troponin    Dyspnea    Electrolyte abnormality    Non-intractable vomiting with nausea    Weakness    Sepsis (HCC)    Pleural effusion, bilateral    Other ascites    Collapse of left lung    History of breast cancer    Coffee ground emesis    Acute respiratory failure with hypoxia (HCC)    Acute respiratory failure with hypoxia and hypercapnia (HCC)    Severe malnutrition (HCC)  Resolved Problems:    * No resolved hospital problems. *    No chief complaint on file. Hospital Course:   Shaunna Joaquin is a 66 y.o. female that was admitted and treated at [facility] for the following medical issues: Active Problems:    Pneumonia    Hypotension    Elevated troponin    Dyspnea    Electrolyte abnormality    Non-intractable vomiting with nausea    Weakness    Sepsis (HCC)    Pleural effusion, bilateral    Other ascites    Collapse of left lung    History of breast cancer    Coffee ground emesis    Acute respiratory failure with hypoxia (HCC)    Acute respiratory failure with hypoxia and hypercapnia (HCC)    Severe malnutrition (HCC)  Resolved Problems:    * No resolved hospital problems. *      Patient was treated in the critical care unit for acute hypoxic hypercapnic respiratory failure that required noninvasive ventilation. She was found to have bilateral pleural effusion suspicious for malignancy. She had a paracentesis to the ascites and initial cytology was negative for malignancy but high suspicion remained for malignancy. She also has a pericardial effusion and the ascites. Patient continue to reaccumulate the fluid. Critical care followed the patient throughout this stay.   After adrenals are unremarkable. Extrarenal pelvises are seen in both kidneys. The ureters appear distended. No  dilated loops of bowel or free air is seen. Diverticulosis coli is seen in the sigmoid colon without evidence for diverticulitis. The endometrial cavity of the uterus appears prominent. There are diffuse atherosclerotic calcifications. Mild compression deformity is seen at L4. No destructive bony lesions are seen. 1. Bilateral pleural effusions with consolidation. The right pleural effusion is larger than left and the left pleural effusion may be loculated 2. A very large volume of ascites. 3. The collecting systems of both kidneys are mildly prominent. No obstructing calcifications are nephrolithiasis seen. 4. Mild compression deformity is seen at L4 that is age indeterminate. All CT scans at this facility use dose modulation, iterative reconstruction, and/or weight based dosing when appropriate to reduce radiation dose to as low as reasonably achievable. Xr Chest Portable    Result Date: 5/8/2019  EXAMINATION: XR CHEST PORTABLE CLINICAL HISTORY: CENTRAL LINE COMPARISONS: MAY 7, 2019, APRIL 27, 2019 FINDINGS: Right jugular vein central line with tip in right atrium. Kyphotic. Osteopenic. Blunting costophrenic angles bilaterally with increased opacities mid and lower lungs bilaterally. Pulmonary vasculature prominent, and indistinct. BILATERAL PLEURAL EFFUSIONS. BILATERAL EDEMA VERSUS ATELECTASIS/PNEUMONIA. Xr Chest Portable    Result Date: 5/8/2019  COMPARISON: None available. HISTORY:  ABDOMINAL PAIN COMMENTS:  WEAKNESS, POSSIBLE DEHYDRATION TECHNIQUE: procedure Thin slice spiral CT was performed from the lung bases through the iliac crests without contrast administration. Contrast enhancement was not employed due to clinician's order. Sagittal and coronal reconstructions were also performed.  FINDINGS: Images through the lung bases demonstrate a large pleural effusion on the right with mildly enlarged portal vein, measuring 1.4 cm in transverse dimension. However spectral analysis demonstrates normal flow direction into the liver in the main portal vein, right and left portal veins and a posterior branch of the right portal vein with normal phasicity of the flow. Hepatic artery flow directionality is also normal into the liver. There is a normal appearing low resistive arterial waveform  Resistive index of the hepatic artery was not obtained on this examination. Right, mid and lower left hepatic veins demonstrate normal direction of flow into the IVC with pulsatile waveforms predominantly below baseline. Common duct in the cade hepatis is normal in caliber measuring 2 to 3 mm. Gallbladder demonstrates multiple small foci of gallbladder wall thickening suspicious for tiny polyps. No diffuse gallbladder wall thickening or mural edema. No large gallstones. Note is made of upper abdominal ascites as well as a right pleural effusion. Pancreas is mostly obscured and not well imaged on this study. UPPER ABDOMINAL ASCITES AND RIGHT PLEURAL EFFUSION. HEPATIC ECHOTEXTURE IS WITHIN NORMAL LIMITS WITH NORMAL DIRECTION OF FLOW ON LIVER VASCULAR ULTRASOUND. PROMINENT MAIN PORTAL VEIN. SMALL FOCI OF GALLBLADDER WALL THICKENING SUSPICIOUS FOR A POLYPS. OTHERWISE NEGATIVE BILIARY ULTRASOUND. NONCONTRIBUTORY PANCREATIC EVALUATION    Us Dup Abd Pel Retro Scrot Complete    Result Date: 5/8/2019  EXAMINATION: US ABDOMEN LIMITED, US DUP ABD PEL RETRO SCROT COMPLETE CLINICAL HISTORY: 77-year-old with abdominal distention and new onset ascites COMPARISONS: Unenhanced CT abdomen 5/7/2019 FINDINGS: Right upper quadrant ultrasound as well as liver Doppler ultrasound were performed by a registered technologist and images submitted for evaluation. Visualized liver is normal in echogenicity without significant lobulated borders. No ultrasound  signs of intrahepatic biliary ductal dilatation or hepatic mass.  Liver vascular Doppler demonstrates a mildly enlarged portal vein, measuring 1.4 cm in transverse dimension. However spectral analysis demonstrates normal flow direction into the liver in the main portal vein, right and left portal veins and a posterior branch of the right portal vein with normal phasicity of the flow. Hepatic artery flow directionality is also normal into the liver. There is a normal appearing low resistive arterial waveform  Resistive index of the hepatic artery was not obtained on this examination. Right, mid and lower left hepatic veins demonstrate normal direction of flow into the IVC with pulsatile waveforms predominantly below baseline. Common duct in the cade hepatis is normal in caliber measuring 2 to 3 mm. Gallbladder demonstrates multiple small foci of gallbladder wall thickening suspicious for tiny polyps. No diffuse gallbladder wall thickening or mural edema. No large gallstones. Note is made of upper abdominal ascites as well as a right pleural effusion. Pancreas is mostly obscured and not well imaged on this study. UPPER ABDOMINAL ASCITES AND RIGHT PLEURAL EFFUSION. HEPATIC ECHOTEXTURE IS WITHIN NORMAL LIMITS WITH NORMAL DIRECTION OF FLOW ON LIVER VASCULAR ULTRASOUND. PROMINENT MAIN PORTAL VEIN. SMALL FOCI OF GALLBLADDER WALL THICKENING SUSPICIOUS FOR A POLYPS. OTHERWISE NEGATIVE BILIARY ULTRASOUND. NONCONTRIBUTORY PANCREATIC EVALUATION    Us Dup Lower Extremities Bilateral Venous    Result Date: 5/8/2019  EXAMINATION: US DUP LOWER EXTREMITIES BILATERAL VENOUS CLINICAL HISTORY: 66-year-old with bilateral lower extremity swelling COMPARISONS: None available. FINDINGS: Duplex and color Doppler ultrasounds were performed of the bilateral lower extremity deep venous systems. Visualized portions of both common femoral veins, femoral veins, and popliteal veins demonstrate satisfactory compression, color flow, and  augmentation.  Segmental views of posterior tibial and peroneal deep calf

## 2019-05-14 NOTE — PROGRESS NOTES
Pt Name: Subha Jonas Record Number: [id number]  Date of Birth 1940   Admit date 5/7/2019 11:47 PM  Today's Date: 5/13/2019     ASSESSMENT  1. Hospital day # 6  2  Ascites unclear etiology  3. Intra abdominal mass    PLAN  1. Patient wants no surgery and is going to hospice      SUBJECTIVE  Chief complaint: SOB  Afebrile, vital signs are stable. She denies any nausea or vomiting, has passed flatus and had a bowel movement. She is tolerating a Diet NPO Effective Now. Her pain is well controlled on current medications. has a past medical history of Cancer ([facility] [address] Hyperlipidemia, and Hypertension. CURRENT MEDS  Scheduled Meds:   [START ON 5/14/2019] vancomycin  750 mg Intravenous Q18H    ipratropium-albuterol  1 ampule Inhalation 4x daily    docusate sodium  100 mg Oral BID    polyethylene glycol  17 g Oral Daily    sodium chloride flush  10 mL Intravenous 2 times per day    piperacillin-tazobactam  3.375 g Intravenous Q8H    vancomycin (VANCOCIN) intermittent dosing (placeholder)   Other RX Placeholder    calcium elemental  500 mg Oral Daily    vitamin D  1,000 Units Oral Daily    potassium chloride  20 mEq Oral BID    simvastatin  5 mg Oral Nightly    aspirin  81 mg Oral Daily    heparin (porcine)  5,000 Units Subcutaneous 3 times per day    pantoprazole  40 mg Intravenous BID    And    sodium chloride (PF)  10 mL Intravenous BID     Continuous Infusions:  PRN Meds:.morphine, ALPRAZolam, albuterol, sodium chloride flush, magnesium hydroxide, ondansetron, acetaminophen, lidocaine    OBJECTIVE  CURRENT VITALS:  height is 4' 10\" (1.473 m) and weight is 97 lb 10.6 oz (44.3 kg). Her axillary temperature is 98.3 °F (36.8 °C). Her blood pressure is 181/98 (abnormal) and her pulse is 109. Her respiration is 34 (abnormal) and oxygen saturation is 86% (abnormal).      GENERAL: alert, no distress  ABDOMEN: soft, non-tender, non-distended, bowel sounds present in all 4 quadrants and no guarding or peritoneal signs, ella umbilical firm non tender masses    In: 614 [I.V.:614]  Out: 2150 [Urine:2150]  Date 05/13/19 0000 - 05/13/19 2359   Shift 3551-1010 8431-8661 1136-9436 24 Hour Total   INTAKE   I.V.(mL/kg) 567(12.8)  47(1.1) 614(13.9)   Shift Total(mL/kg) 745(79.3)  47(1.1) 614(13.9)   OUTPUT   Urine(mL/kg/hr) 1300(3.7)  850 2150   Shift Total(mL/kg) 1300(29.3)  294(47.5) 9676(28.3)   Weight (kg) 44.3 44.3 44.3 44.3       LABS  Recent Labs     05/11/19 0458 05/12/19 0600 05/13/19  0203 05/13/19 0517 05/13/19 0519   WBC 7.2 7.5  --   --  4.9   HGB 10.4* 10.7* 10.8*  --  9.9*   HCT 31.5* 32.4*  --   --  30.0*   * 139  --   --  138   * 134*  --  137  --    K 3.9 4.5  --  4.2  --    CL 91* 92*  --  94*  --    CO2 37* 32*  --  29  --    BUN 17 16  --  13  --    CREATININE 0.63 0.56 0.9 0.61  --    MG 2.0 1.8  --  1.7  --    CALCIUM 7.6* 8.0*  --  8.1*  --       No results for input(s): PTT, INR in the last 72 hours. Invalid input(s): PT  Recent Labs     05/11/19 0458 05/12/19 0600 05/13/19 0517   AST 22 30 41*   ALT 11 15 20   BILITOT 0.8* 0.7 0.7       RADIOLOGY  Ct Abdomen Pelvis Wo Contrast Additional Contrast? None    Result Date: 5/8/2019  COMPARISON: None available. HISTORY:  ABDOMINAL PAIN COMMENTS:  WEAKNESS, POSSIBLE DEHYDRATION TECHNIQUE: procedure Thin slice spiral CT was performed from the lung bases through the iliac crests without contrast administration. Contrast enhancement was not employed due to clinician's order. Sagittal and coronal reconstructions were also performed. FINDINGS: Images through the lung bases demonstrate a large pleural effusion on the right with consolidation that contains air bronchograms. Smaller pleural effusion is seen on the left and there is also consolidation. The left effusion may also be loculated. Air bronchograms are also seen in the right middle lobe. The heart is enlarged. A hiatal hernia is seen. A very large amount of ascites is seen. Non-contrast images of the liver,  pancreas, spleen and adrenals are unremarkable. Extrarenal pelvises are seen in both kidneys. The ureters appear distended. No  dilated loops of bowel or free air is seen. Diverticulosis coli is seen in the sigmoid colon without evidence for diverticulitis. The endometrial cavity of the uterus appears prominent. There are diffuse atherosclerotic calcifications. Mild compression deformity is seen at L4. No destructive bony lesions are seen. 1. Bilateral pleural effusions with consolidation. The right pleural effusion is larger than left and the left pleural effusion may be loculated 2. A very large volume of ascites. 3. The collecting systems of both kidneys are mildly prominent. No obstructing calcifications are nephrolithiasis seen. 4. Mild compression deformity is seen at L4 that is age indeterminate. All CT scans at this facility use dose modulation, iterative reconstruction, and/or weight based dosing when appropriate to reduce radiation dose to as low as reasonably achievable. Xr Chest Standard (2 Vw)    Addendum Date: 5/9/2019    ADDENDUM: This addendum was created on 5/9/2019 1:58 PM by Berto Lozano M.D. No evidence of pneumothorax in the right chest, site of thoracentesis. Interval resolution of the previously seen right-sided pleural effusion, consistent with thoracentesis. FINDINGS: No evidence of pneumothorax in the right chest. Interval resolution of the right-sided pleural effusion consistent with thoracentesis today. Xr Chest Standard (2 Vw)    Result Date: 4/27/2019  EXAMINATION: XR CHEST (2 VW) CLINICAL HISTORY: COUGH COMPARISONS: None available. FINDINGS: Osteopenia. Thoracic kyphosis. Cardiopericardial silhouette normal. Aorta calcified and tortuous. Bilateral blunting costophrenic angles left greater than right, with ill-defined area of increased opacity bilateral lung bases, right greater than left.      BILATERAL PLEURAL EFFUSIONS WITH BIBASILAR ATELECTASIS/PNEUMONIA. Ct Chest Wo Contrast    1. There is a large left pleural effusion with complete collapse of the left lung. The left mainstem bronchus is occluded as well. This may be secondary to external compression from the fluid versus internal mucous plug. 2.  There is a small right pleural effusion. Opacification of the right lung base may represent pneumonia versus compressive atelectasis. COMPARISON: CT of the abdomen dating May 7, 2019 DIAGNOSIS: Left lung collapse. COMMENTS: E86.1 Hypovolemia ICD10 TECHNIQUE:   Spiral scanning of the chest was performed without contrast as per the referring physician. CT Dose-Length Product (estimate related to radiation exposure from this exam):  161.69  mGy*cm. FINDINGS: There is a large left pleural effusion with complete collapse of the left lung. There is obstruction of the left mainstem bronchus which may be secondary to extrinsic compression from the effusion versus internal obstruction such as a mucous plug. There is a small right pleural effusion. There is right basilar opacification which may represent pneumonia versus compressive atelectasis. There is atherosclerotic calcification of the aorta and its branches. Visualized osseous structures are unremarkable. Evaluation of the mediastinum is limited secondary to lack of IV contrast and collapse of the left lung. Lymphadenopathy cannot be well evaluated for. Visualized upper abdomen demonstrates bilateral dilatation of the renal collecting systems as seen on prior study. Ct Abdomen Pelvis W Iv Contrast Additional Contrast? None    Result Date: 5/10/2019  EXAMINATION:  CT ABDOMEN AND PELVIS WITH IV CONTRAST CLINICAL HISTORY:  HISTORY OF BREAST CANCER, NEW ONSET OF ASCITES, RLQ ABDOMINAL PAIN COMPARISON:  5/7/2019 TECHNIQUE:  CT abdomen and pelvis obtained following IV injection 100 mL of Isovue-370.  FINDINGS:  CT scans through the lower chest demonstrate bilateral pleural effusions with bilateral lower lobe atelectasis and pulmonary infiltrate in the RLL. Additional details may be found on the CT chest report from 5/9/2019. There is a small to moderate amount of ascites in the abdomen and pelvis. The volume of ascites has decreased since the 5/7/2019 CT abdomen. The liver, gallbladder, spleen, pancreas and adrenal glands appear normal. There is a prominent extrarenal pelvis  in each kidney and there is right pelvocaliectasis. Also, there is bilateral hydroureters which may be secondary to the distended urinary bladder. There is a Hernandez catheter within the distended bladder and this raises the possibility of a malfunctioning  Hernandez catheter. There is no retroperitoneal adenopathy. There is atherosclerotic calcification of the abdominal aorta without an abdominal aorta aneurysm. No abdominal abscess or pneumoperitoneum. There is a nondistended stomach with an air/fluid level. There is a nondistended fluid-filled small bowel which is nonspecific or may represent an enteritis in the proper clinical setting. Only a small portion of the appendix is visualized and there is no definitive CT evidence suggesting appendicitis. There is gas and fecal matter scattered throughout the colon and no evidence of bowel obstruction. There is sigmoid colon diverticulosis without diverticulitis. CT scan of pelvis demonstrates a normal size uterus with hypodensity within the endometrial cavity and this may represent fluid within the uterine cavity. An intrauterine lesion or endometrial lesion cannot be excluded on this CT abdomen/pelvis. There is  no pelvic adenopathy. There is no inguinal hernia or adenopathy. There is an old L4 compression fracture. 1. Bilateral pleural effusions with concomitant bilateral lower lobe atelectasis and RLL pulmonary infiltrate. 2. Small to moderate amount of ascites in abdomen and pelvis. Decreased amount of ascites since 5/7/2019 CT scan.  3. Distended urinary bladder with a Hernandez catheter in place and this suggests possibly a malfunctioning Hernandez catheter. 4. Bilaterally prominent extrarenal pelves and right pelvocaliectasis. Also, there is bilateral hydroureters without visible ureteral calculi and this may be secondary to the distended urinary bladder. 5. Nondistended fluid-filled small bowel is nonspecific or may represent an enteritis in the proper clinical setting. 6. Visualized small portion of appendix appears unremarkable and there is colonic diverticulosis without diverticulitis. 7. Hypodense endometrial cavity may represent fluid within the uterine cavity but endometrial lesion not excluded. All CT scans at this facility use dose modulation, iterative reconstruction, and/or weight based dosing when appropriate to reduce radiation dose to as low as reasonably achievable. Xr Chest Portable    Result Date: 5/13/2019  EXAMINATION: XR CHEST PORTABLE CLINICAL HISTORY:  Bilateral effusions, left lung collapse and volume loss COMPARISONS: May 12, 2019. May 11, 2019. May 10, 2019. FINDINGS: Single AP portable view the chest was obtained on May 13, 2019 at 1256 hours. Findings have worsened, including larger left effusion, greater airspace opacity in both lungs and slightly greater heart size. The calcified aorta has not dilated. The mediastinum has not widened or shifted. The right internal jugular vein central venous catheter has not changed. CONCLUSION: WORSENING SECONDARY SIGNS OF CONGESTIVE HEART FAILURE    Xr Chest Portable    Result Date: 5/12/2019  Portable chest radiograph History: Acute respiratory failure. Severe shortness of breath. Technique: AP portable view of the chest obtained. Comparison: Portable chest radiograph from May 11, 2019 and May 10, 2019 Findings: Right-sided central venous catheter remains. Atherosclerotic calcification of the thoracic aorta.  Interval increase in size of left-sided pleural effusion likely with adjacent atelectasis, resulting in near complete opacification of the left hemithorax. Small right-sided pleural effusion with atelectasis is unchanged. No pneumothorax. Osseous structures appear intact. Interval increase in size of left-sided pleural effusion, now large, resulting in near complete opacification of the left hemithorax. Unchanged small right pleural effusion. Xr Chest Portable    Result Date: 5/11/2019  Portable chest radiograph History: Respiratory failure Technique: AP portable view of the chest obtained. Comparison: Chest radiograph from May 10, 2019 50 chest from May 9, 2019 Findings: Right internal jugular central catheter remains. Atherosclerotic calcification of the thoracic aorta. Moderate bilateral pleural effusions, greater on the left, likely with adjacent atelectasis or infiltrate. No pneumothorax. Heart border is obscured by the left pleural effusion. Osseous structures appear intact. Overall no significant interval change. No significant interval change of moderate bilateral pleural effusions, left greater than right. No pneumothorax. Xr Chest Portable    Result Date: 5/10/2019  EXAMINATION: PORTABLE CHEST X-RAY FROM 5/10/2019 AT 9:36 AM CLINICAL HISTORY: STATUS POST RIGHT THORACENTESIS, EVALUATE FOR POSSIBLE POSTTHORACENTESIS PNEUMOTHORAX, FOLLOW-UP PLEURAL EFFUSIONS COMPARISONS: 5/9/2019 FINDINGS: There is no evidence of a pneumothorax following the right thoracentesis procedure. There has been a decrease in the volume of the bilateral pleural effusions. There remains a small pleural effusion blunting the right CP angle and a moderately large left pleural effusion. Suspect underlying atelectasis and/or infiltrate in the left lung. Mild cardiomegaly and atherosclerotic tortuous aorta is once again noted. A right CVC catheter appears in satisfactory position in the SVC. Also, there is osteopenia and mild degenerative bone spurring throughout the spine. 1. NO PNEUMOTHORAX FOLLOWING THE RIGHT THORACENTESIS PROCEDURE. 2. DECREASED VOLUME OF THE BILATERAL PLEURAL EFFUSIONS SINCE PRIOR CHEST FILMS. 3. SUSPECT UNDERLYING ATELECTASIS AND/OR INFILTRATE IN THE LEFT LUNG SIMILAR TO PRIOR CXR. Xr Chest Portable    Result Date: 5/9/2019  EXAMINATION: XR CHEST, PORTABLE SINGLE VIEW: DATE AND TIME: 5/9/2019 at 12:00 AM. CLINICAL HISTORY: SHORTNESS OF BREATH. POST RIGHT THORACENTESIS. DR. Jazzy Parrish TO READ. COMPARISONS: May 8, 2019. FINDINGS: CVP line unchanged in position. Large bilateral effusions with hilar haze and vascular congestion consistent with CHF. No change. FLORID PULMONARY EDEMA WITH LARGE EFFUSIONS. NO CHANGE. Xr Chest Portable    Result Date: 5/8/2019  EXAMINATION: XR CHEST PORTABLE CLINICAL HISTORY: CENTRAL LINE COMPARISONS: MAY 7, 2019, APRIL 27, 2019 FINDINGS: Right jugular vein central line with tip in right atrium. Kyphotic. Osteopenic. Blunting costophrenic angles bilaterally with increased opacities mid and lower lungs bilaterally. Pulmonary vasculature prominent, and indistinct. BILATERAL PLEURAL EFFUSIONS. BILATERAL EDEMA VERSUS ATELECTASIS/PNEUMONIA. Xr Chest Portable    Result Date: 5/8/2019  COMPARISON: None available. HISTORY:  ABDOMINAL PAIN COMMENTS:  WEAKNESS, POSSIBLE DEHYDRATION TECHNIQUE: procedure Thin slice spiral CT was performed from the lung bases through the iliac crests without contrast administration. Contrast enhancement was not employed due to clinician's order. Sagittal and coronal reconstructions were also performed. FINDINGS: Images through the lung bases demonstrate a large pleural effusion on the right with consolidation that contains air bronchograms. Smaller pleural effusion is seen on the left and there is also consolidation. The left effusion may also be loculated. Air bronchograms are also seen in the right middle lobe. The heart is enlarged. A hiatal hernia is seen. A very large amount of ascites is seen.  Non-contrast images of the liver,  pancreas, spleen and adrenals are unremarkable. Extrarenal pelvises are seen in both kidneys. The ureters appear distended. No  dilated loops of bowel or free air is seen. Diverticulosis coli is seen in the sigmoid colon without evidence for diverticulitis. The endometrial cavity of the uterus appears prominent. There are diffuse atherosclerotic calcifications. Mild compression deformity is seen at L4. No destructive bony lesions are seen. 1. Bilateral pleural effusions with consolidation. The right pleural effusion is larger than left and the left pleural effusion may be loculated 2. A very large volume of ascites. 3. The collecting systems of both kidneys are mildly prominent. No obstructing calcifications are nephrolithiasis seen. 4. Mild compression deformity is seen at L4 that is age indeterminate. All CT scans at this facility use dose modulation, iterative reconstruction, and/or weight based dosing when appropriate to reduce radiation dose to as low as reasonably achievable. Us Abdomen Limited    Result Date: 5/8/2019  EXAMINATION: US ABDOMEN LIMITED, US DUP ABD PEL RETRO SCROT COMPLETE CLINICAL HISTORY: 31-year-old with abdominal distention and new onset ascites COMPARISONS: Unenhanced CT abdomen 5/7/2019 FINDINGS: Right upper quadrant ultrasound as well as liver Doppler ultrasound were performed by a registered technologist and images submitted for evaluation. Visualized liver is normal in echogenicity without significant lobulated borders. No ultrasound  signs of intrahepatic biliary ductal dilatation or hepatic mass. Liver vascular Doppler demonstrates a mildly enlarged portal vein, measuring 1.4 cm in transverse dimension. However spectral analysis demonstrates normal flow direction into the liver in the main portal vein, right and left portal veins and a posterior branch of the right portal vein with normal phasicity of the flow. Hepatic artery flow directionality is also normal into the liver.  There is a normal appearing low resistive arterial waveform  Resistive index of the hepatic artery was not obtained on this examination. Right, mid and lower left hepatic veins demonstrate normal direction of flow into the IVC with pulsatile waveforms predominantly below baseline. Common duct in the acde hepatis is normal in caliber measuring 2 to 3 mm. Gallbladder demonstrates multiple small foci of gallbladder wall thickening suspicious for tiny polyps. No diffuse gallbladder wall thickening or mural edema. No large gallstones. Note is made of upper abdominal ascites as well as a right pleural effusion. Pancreas is mostly obscured and not well imaged on this study. UPPER ABDOMINAL ASCITES AND RIGHT PLEURAL EFFUSION. HEPATIC ECHOTEXTURE IS WITHIN NORMAL LIMITS WITH NORMAL DIRECTION OF FLOW ON LIVER VASCULAR ULTRASOUND. PROMINENT MAIN PORTAL VEIN. SMALL FOCI OF GALLBLADDER WALL THICKENING SUSPICIOUS FOR A POLYPS. OTHERWISE NEGATIVE BILIARY ULTRASOUND. NONCONTRIBUTORY PANCREATIC EVALUATION    Us Dup Abd Pel Retro Scrot Complete    Result Date: 5/8/2019  EXAMINATION: US ABDOMEN LIMITED, US DUP ABD PEL RETRO SCROT COMPLETE CLINICAL HISTORY: 70-year-old with abdominal distention and new onset ascites COMPARISONS: Unenhanced CT abdomen 5/7/2019 FINDINGS: Right upper quadrant ultrasound as well as liver Doppler ultrasound were performed by a registered technologist and images submitted for evaluation. Visualized liver is normal in echogenicity without significant lobulated borders. No ultrasound  signs of intrahepatic biliary ductal dilatation or hepatic mass. Liver vascular Doppler demonstrates a mildly enlarged portal vein, measuring 1.4 cm in transverse dimension. However spectral analysis demonstrates normal flow direction into the liver in the main portal vein, right and left portal veins and a posterior branch of the right portal vein with normal phasicity of the flow. Hepatic artery flow directionality is also normal into the liver. There is a normal appearing low resistive arterial waveform  Resistive index of the hepatic artery was not obtained on this examination. Right, mid and lower left hepatic veins demonstrate normal direction of flow into the IVC with pulsatile waveforms predominantly below baseline. Common duct in the cade hepatis is normal in caliber measuring 2 to 3 mm. Gallbladder demonstrates multiple small foci of gallbladder wall thickening suspicious for tiny polyps. No diffuse gallbladder wall thickening or mural edema. No large gallstones. Note is made of upper abdominal ascites as well as a right pleural effusion. Pancreas is mostly obscured and not well imaged on this study. UPPER ABDOMINAL ASCITES AND RIGHT PLEURAL EFFUSION. HEPATIC ECHOTEXTURE IS WITHIN NORMAL LIMITS WITH NORMAL DIRECTION OF FLOW ON LIVER VASCULAR ULTRASOUND. PROMINENT MAIN PORTAL VEIN. SMALL FOCI OF GALLBLADDER WALL THICKENING SUSPICIOUS FOR A POLYPS. OTHERWISE NEGATIVE BILIARY ULTRASOUND. NONCONTRIBUTORY PANCREATIC EVALUATION    Us Dup Lower Extremities Bilateral Venous    Result Date: 5/8/2019  EXAMINATION: US DUP LOWER EXTREMITIES BILATERAL VENOUS CLINICAL HISTORY: 80-year-old with bilateral lower extremity swelling COMPARISONS: None available. FINDINGS: Duplex and color Doppler ultrasounds were performed of the bilateral lower extremity deep venous systems. Visualized portions of both common femoral veins, femoral veins, and popliteal veins demonstrate satisfactory compression, color flow, and  augmentation. Segmental views of posterior tibial and peroneal deep calf veins also demonstrate satisfactory compression and color flow. Deep calf veins are incompletely imaged in their entirety.      NO ULTRASOUND SIGNS OF THROMBUS IN THE BILATERAL LOWER EXTREMITY DEEP VENOUS SYSTEMS AS DETAILED ABOVE    Electronically signed by Juan C Lee MD on 5/13/2019 at 9:13 PM

## 2019-05-14 NOTE — DISCHARGE INSTR - COC
Continuity of Care Form    Patient Name: Heidi Mckeon   :  1940  MRN:  [id number]    [facility] date:  2019  Discharge date:  ***    Code Status Order: [facility]   Advance Directives:   [facility] Documentation     Date/Time Healthcare Directive Type of Healthcare Directive Copy in [address] Agent's Name Healthcare Agent's Phone Number    19 0024  No, patient does not have an advance directive for healthcare treatment -- -- -- -- --          Admitting Physician: Victor Manuel Valencia MD  PCP: Oleg Muse MD    Discharging Nurse: [facility] Unit/Room#: IC07/IC07-01  Discharging Unit Phone Number: ***    Emergency Contact:   Extended Emergency Contact Information  Primary Emergency Contact: Eusebio Silvano Phone: [phone]  Relation: None  Preferred language: English  Secondary Emergency Contact: Ximena Butler Phone: [phone]  Relation: Child  Preferred language: English   needed? No    Past Surgical History:  Past Surgical History:   Procedure Laterality Date    MASTECTOMY      bilateral, first one in , 2nd one few years ago    THORACENTESIS Right 2019    790 ml clear, yellow fluid removed by Dr. Chao Neville       Immunization History: There is no immunization history on file for this patient.     Active Problems:  Patient Active Problem List   Diagnosis Code    Pneumonia J18.9    Hypotension I95.9    Elevated troponin R74.8    Dyspnea R06.00    Electrolyte abnormality E87.8    Non-intractable vomiting with nausea R11.2    Weakness R53.1    Sepsis (HCC) A41.9    Pleural effusion, bilateral J90    Other ascites R18.8    Collapse of left lung J98.11    History of breast cancer Z85.3    Coffee ground emesis K92.0    Acute respiratory failure with hypoxia (HCC) J96.01    Acute respiratory failure with hypoxia and hypercapnia (HCC) J96.01, J96.02    Severe malnutrition ([facility] [address] E43 Isolation/Infection:   Isolation          No Isolation            Nurse Assessment:  Last Vital Signs: BP (!) 146/74   Pulse 111   Temp 97.8 °F (36.6 °C) (Axillary)   Resp 28   Ht 4' 10\" (1.473 m)   Wt 97 lb 10.6 oz (44.3 kg)   SpO2 90%   BMI 20.41 kg/m²     Last documented pain score (0-10 scale): Pain Level: 4  Last Weight:   Wt Readings from Last 1 Encounters:   05/13/19 97 lb 10.6 oz (44.3 kg)     Mental Status:  {IP PT MENTAL STATUS:20030}    IV Access:  { JACKIE IV ACCESS:595133690}    Nursing Mobility/ADLs:  Walking   {P DME XCZN:699679525}  Transfer  {P DME NUUL:226850613}  Bathing  {CHP DME OVTT:966624384}  Dressing  {CHP DME PKWM:077756809}  Toileting  {P DME YCZW:799050661}  Feeding  {P DME IOLX:145262535}  Med Admin  {P DME JURN:925942789}  Med Delivery   { JACKIE MED Delivery:282400489}    Wound Care Documentation and Therapy:        Elimination:  Continence:   · Bowel: {YES / QL:88868}  · Bladder: {YES / UZ:92819}  Urinary Catheter: {Urinary Catheter:278901367}   Colostomy/Ileostomy/Ileal Conduit: {YES / GK:65289}       Date of Last BM: ***    Intake/Output Summary (Last 24 hours) at 5/14/2019 1338  Last data filed at 5/14/2019 0542  Gross per 24 hour   Intake 402 ml   Output 1200 ml   Net -798 ml     I/O last 3 completed shifts:   In: 402 [I.V.:402]  Out: 1200 [Urine:1200]    Safety Concerns:     [address] Safety Concerns:452042049}    Impairments/Disabilities:      [address] Impairments/Disabilities:384309743}    Nutrition Therapy:  Current Nutrition Therapy:   [address] Diet List:075903871}    Routes of Feeding: {CHP DME Other Feedings:436744874}  Liquids: {Slp liquid thickness:93512}  Daily Fluid Restriction: {CHP DME Yes amt example:856728690}  Last Modified Barium Swallow with Video (Video Swallowing Test): {Done Not Done DXVQ:280457253}    Treatments at the Time of Hospital Discharge:   Respiratory Treatments: ***  Oxygen Therapy:  {Therapy; copd oxygen:07472}  Ventilator:    [address] CC Vent XPPV:268421176}    Rehab Therapies: {THERAPEUTIC INTERVENTION:0929120148}  Weight Bearing Status/Restrictions: {[facility] Weight Bearin}  Other Medical Equipment (for information only, NOT a DME order):  {EQUIPMENT:689184792}  Other Treatments: ***    Patient's personal belongings (please select all that are sent with patient):  {CHP DME Belongings:799969997}    RN SIGNATURE:  {Esignature:249949259}    CASE MANAGEMENT/SOCIAL WORK SECTION    Inpatient Status Date: ***    Readmission Risk Assessment Score:  Readmission Risk              Risk of Unplanned Readmission:        17           Discharging to Facility/ Agency   · Name:   · Address:  · Phone:  · Fax:    Dialysis Facility (if applicable)   · Name:  · Address:  · Dialysis Schedule:  · Phone:  · Fax:    / signature: {Esignature:698680159}    PHYSICIAN SECTION    Prognosis: Poor    Condition at Discharge: Terminal    Rehab Potential (if transferring to Rehab): Poor    Recommended Labs or Other Treatments After Discharge: none     Physician Certification: I certify the above information and transfer of Shaunna Joaquin  is necessary for the continuing treatment of the diagnosis listed and that she requires Hospice for less 30 days.      Update Admission H&P: No change in H&P    PHYSICIAN SIGNATURE:  Electronically signed by Jeremías Barnett DO on 19 at 1:38 PM

## 2019-05-14 NOTE — PROGRESS NOTES
Assumed are of patient. Patient quite and resting in bed. Patient oriented to self, place and situation. Patient and family requests that patient be disturbed as least as possible. Patient denies pain at this time. 1030: Bedside monitors discontinued per family request. Patient denies pain or discomfort and any further needs at this time. 1200: Patient denies pain. 1230: report called to Hospice.  Patient to be transported at 2pm